# Patient Record
Sex: FEMALE | Race: WHITE
[De-identification: names, ages, dates, MRNs, and addresses within clinical notes are randomized per-mention and may not be internally consistent; named-entity substitution may affect disease eponyms.]

---

## 2018-01-19 ENCOUNTER — HOSPITAL ENCOUNTER (EMERGENCY)
Dept: HOSPITAL 25 - UCEAST | Age: 83
Discharge: HOME | End: 2018-01-19
Payer: MEDICARE

## 2018-01-19 VITALS — SYSTOLIC BLOOD PRESSURE: 140 MMHG | DIASTOLIC BLOOD PRESSURE: 63 MMHG

## 2018-01-19 DIAGNOSIS — S90.421A: ICD-10-CM
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
## 2018-01-19 DIAGNOSIS — S92.424A: Primary | ICD-10-CM
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
## 2018-01-19 PROCEDURE — 99212 OFFICE O/P EST SF 10 MIN: CPT

## 2018-01-19 PROCEDURE — G0463 HOSPITAL OUTPT CLINIC VISIT: HCPCS

## 2018-01-19 NOTE — RAD
Indication: Right great toe injury.



3 views of the right great toe demonstrates suggestion of a nondisplaced fracture through

the distal phalanx of the great toe. The proximal phalanx is unremarkable.



IMPRESSION: There is suggestion of a fracture of the distal a length of the great toe.

## 2018-01-19 NOTE — UC
Lower Extremity/Ankle HPI





- HPI Summary


HPI Summary: 


hit right great toe on a door 2 nights ago---nail is begining to lift off and 

some additional erythema from end of toe and back to first joint--tender to 

touch no warmth or streaking








- History of Current Complaint


Chief Complaint: UCLowerExtremity


Stated Complaint: TOE INJURY


Time Seen by Provider: 01/19/18 11:43


Hx Obtained From: Patient


Pregnant?: No


Onset/Duration: Sudden Onset, Lasting Days - 2, Still Present


Severity Initially: Moderate


Severity Currently: Moderate


Pain Intensity: 5


Pain Scale Used: 0-10 Numeric


Aggravating Factor(s): Standing, Ambulation


Alleviating Factor(s): Rest, Elevation


Able to Bear Weight: Yes





- Allergies/Home Medications


Allergies/Adverse Reactions: 


 Allergies











Allergy/AdvReac Type Severity Reaction Status Date / Time


 


Penicillins Allergy Mild Hives Verified 01/19/18 11:18


 


Cephalexin [From Keflex] Allergy Unknown Unknown Verified 01/19/18 11:18





   Reaction  





   Details  


 


Lisinopril Allergy  Shortness Verified 01/19/18 11:18





   of Breath  


 


Sulfa Drugs AdvReac Severe CHEST PAIN Verified 01/19/18 11:18


 


metal Allergy Intermediate Rash Uncoded 01/19/18 11:18


 


nylon Allergy Mild Rash Uncoded 01/19/18 11:18


 


tape Allergy Mild Rash Uncoded 01/19/18 11:18














PMH/Surg Hx/FS Hx/Imm Hx


Previously Healthy: No


Cardiovascular History: Hypertension





- Surgical History


Surgical History: Yes


Surgery Procedure, Year, and Place: 1946 APPENDECTOMY, [facility].  1969 HYSTERECTOMY, 

[facility].  [place] [facility] [place] BILATERAL CATARACT EXTRACTION WITH IOL 

[place] [facility] [place]  [place]ator cuff repair 2012.  2012 LEFT SHOULDER ROTATOR CUFF 

[place] [facility] [place]  [place]ER SURGERY BILATERAL EYES.  2010 EXCISION RIGHT LITTLE FINGER 

[place] [facility] [place]  Growth removed from upper lip





- Family History


Known Family History: Positive: Hypertension





- Social History


Occupation: Retired


Lives: With Family


Alcohol Use: None


Substance Use Type: None


Smoking Status (MU): Never Smoked Tobacco





Review of Systems


Constitutional: Negative


Skin: Other - blood blister tip of right great toe


Eyes: Negative


ENT: Negative


Respiratory: Negative


Cardiovascular: Negative


Gastrointestinal: Negative


Genitourinary: Negative


Motor: Negative


Neurovascular: Negative


Musculoskeletal: Arthralgia - right great toe


Neurological: Negative


Psychological: Negative


Is Patient Immunocompromised?: No


All Other Systems Reviewed And Are Negative: Yes





Physical Exam


Triage Information Reviewed: Yes


Appearance: Well-Appearing, No Pain Distress, Well-Nourished


Vital Signs: 


 Initial Vital Signs











Temp  97.7 F   01/19/18 11:10


 


Pulse  71   01/19/18 11:10


 


Resp  18   01/19/18 11:10


 


BP  140/63   01/19/18 11:10


 


Pulse Ox  99   01/19/18 11:10











Vital Signs Reviewed: Yes


Eye Exam: Normal


Eyes: Positive: Conjunctiva Clear


ENT Exam: Normal


ENT: Positive: Normal ENT inspection, Hearing grossly normal, Pharynx normal.  

Negative: Nasal drainage, TMs normal, Tonsillar swelling, Trismus, Hoarse voice

, Dental tenderness, Sinus tenderness


Dental Exam: Normal


Neck exam: Normal


Neck: Positive: Supple, Nontender, No Lymphadenopathy


Respiratory Exam: Normal


Respiratory: Positive: Chest non-tender, Lungs clear, Normal breath sounds, No 

respiratory distress, No accessory muscle use


Cardiovascular Exam: Normal


Cardiovascular: Positive: RRR, No Murmur, Pulses Normal, Brisk Capillary Refill


Musculoskeletal Exam: Normal


Musculoskeletal: Positive: Strength Intact, ROM Intact, No Edema


Neurological Exam: Normal


Neurological: Positive: Alert, Muscle Tone Normal


Psychological Exam: Normal


Skin Exam: Normal





Diagnostics





- Radiology


  ** No standard instances


Xray Interpretation: Positive (See Comments) - no displaced fracture of distal 

phlange  right great toe


Radiology Interpretation Completed By: Radiologist





Lower Extremity Course/Dx





- Course


Course Of Treatment: post op shoe (patient refused) antibiodic,ibuprofen, 

dressing , follow with pcp





- Differential Dx/Diagnosis


Provider Diagnoses: Nondisplaced fracture distal phalange right great toe, 

blood blister right great toe





Discharge





- Discharge Plan


Condition: Stable


Disposition: HOME


Prescriptions: 


Doxycycline (Monohydrate) [Doxycycline Monohydrate] 100 mg PO BID #20 cap


Patient Education Materials:  Toe Fracture (ED), Hypertension (ED), Nail 

Avulsion (ED), Warm Compress or Soak (ED)


Referrals: 


Tracy Carlin MD [Primary Care Provider] - 1 Week

## 2019-05-29 NOTE — HP
Amended report to enter cosigning physician.



HISTORY AND PHYSICAL:

 

DATE OF ADMISSION/SURGERY:  06/11/19

 

DATE OF OFFICE VISIT:  05/29/19

 

SURGEON:  Radha Collado MD* (dictated by GLORIA Francis).

 

PROCEDURE:  Left total hip arthroplasty.

 

CHIEF COMPLAINT:  Left hip pain.

 

HISTORY OF PRESENT ILLNESS:  Ms. Allen is an 89-year-old female with end-stage 
osteoarthritis of the left hip.  She has failed conservative treatment and 
elected to proceed with a left total hip arthroplasty.

 

PAST MEDICAL HISTORY:  Macular degeneration, heart murmur, and a history of 
stroke.

 

PAST SURGICAL HISTORY:  Appendectomy, hysterectomy, cholecystectomy, and a left 
rotator cuff repair.

 

CURRENT MEDICATIONS:

1.  Aspirin 81 mg a day.

2.  Calcium with vitamin D.

3.  Travatan eye drops once a day.

4.  Women's vitamins.

5.  Vitamin B12.

6.  Eylea injections.

7.  Tylenol as needed.

8.  Fiber therapy.

9.  Proctosol.

10.  Crestor 40 mg a day.

11.  Diltiazem 120 mg a day.

 

ALLERGIES:  PENICILLIN, SULFA, KEFLEX, LISINOPRIL, METAXALONE, LATEX, TAPE.

 

FAMILY HISTORY:  Coronary artery disease, cancer, and diabetes.

 

SOCIAL HISTORY:  She is an 89-year-old female.  She lives alone.  She does not 
smoke or use drugs.

 

REVIEW OF SYSTEMS:  A complete 14-point review of systems was reviewed with the 
patient.  It was positive for history of stroke.  She denies history of DVT, PE
, hepatitis, HIV, or anesthesia problems.

 

                               PHYSICAL EXAMINATION

 

GENERAL:  She is well developed, well nourished, in no acute distress.

 

VITAL SIGNS:  She stands 5 feet 3 inches tall, weighs 144 pounds.  Her blood 
pressure is 117/57, her heart rate is 73.

 

HEENT:  Normocephalic, atraumatic.

 

NECK:  Supple.  No palpable lymph nodes.

 

PULMONARY:  The lungs are clear to auscultation bilaterally.

 

CARDIO:  Regular rate and rhythm.  Strong S1, S2.

 

ABDOMEN:  Soft, nontender, nondistended.

 

NEUROLOGICAL:  She is alert and oriented x3.

 

MUSCULOSKELETAL:  Left lower extremity:  The skin is intact.  There are no open 
wounds or abrasions.  She walks with an antalgic-type gait favoring her left 
hip. She has decreased internal and external rotation of the left hip.  She has 
a 2+ dorsalis pedis pulse.  She is able to dorsiflex and plantar flex and has 
intact sensation.

 

 ASSESSMENT AND PLAN:  Ms. Allen is an 89-year-old female with end-stage 
osteoarthritis of the left hip.  She has failed conservative treatment and 
elected to proceed with a left total hip arthroplasty.  The surgery is 
scheduled for 06/11/19 with Dr. Collado.  Dr. Collado discussed the risks and 
benefits of the surgery at today's visit and all of her questions were 
answered.  No TXA will be used in this patient secondary to her history of 
stroke.

 

 ____________________________________ 

GLORIA FRANCIS

 

[id number] #: [id number]

MTDARSALAN

## 2019-06-11 ENCOUNTER — HOSPITAL ENCOUNTER (INPATIENT)
Dept: HOSPITAL 25 - AA | Age: 84
LOS: 6 days | Discharge: NURSING, REHAB OR LONG-TERM CARE | DRG: 470 | End: 2019-06-17
Attending: ORTHOPAEDIC SURGERY | Admitting: ORTHOPAEDIC SURGERY
Payer: MEDICARE

## 2019-06-11 DIAGNOSIS — Z91.048: ICD-10-CM

## 2019-06-11 DIAGNOSIS — Z91.040: ICD-10-CM

## 2019-06-11 DIAGNOSIS — E78.00: ICD-10-CM

## 2019-06-11 DIAGNOSIS — Z88.8: ICD-10-CM

## 2019-06-11 DIAGNOSIS — D41.4: ICD-10-CM

## 2019-06-11 DIAGNOSIS — R14.1: ICD-10-CM

## 2019-06-11 DIAGNOSIS — Z88.2: ICD-10-CM

## 2019-06-11 DIAGNOSIS — H35.30: ICD-10-CM

## 2019-06-11 DIAGNOSIS — Z85.828: ICD-10-CM

## 2019-06-11 DIAGNOSIS — Z98.42: ICD-10-CM

## 2019-06-11 DIAGNOSIS — Z90.710: ICD-10-CM

## 2019-06-11 DIAGNOSIS — R15.9: ICD-10-CM

## 2019-06-11 DIAGNOSIS — K59.00: ICD-10-CM

## 2019-06-11 DIAGNOSIS — K30: ICD-10-CM

## 2019-06-11 DIAGNOSIS — Z90.49: ICD-10-CM

## 2019-06-11 DIAGNOSIS — K21.9: ICD-10-CM

## 2019-06-11 DIAGNOSIS — Z82.0: ICD-10-CM

## 2019-06-11 DIAGNOSIS — Z83.3: ICD-10-CM

## 2019-06-11 DIAGNOSIS — Z82.49: ICD-10-CM

## 2019-06-11 DIAGNOSIS — Z80.9: ICD-10-CM

## 2019-06-11 DIAGNOSIS — Z86.73: ICD-10-CM

## 2019-06-11 DIAGNOSIS — E04.9: ICD-10-CM

## 2019-06-11 DIAGNOSIS — Z88.0: ICD-10-CM

## 2019-06-11 DIAGNOSIS — M16.12: Primary | ICD-10-CM

## 2019-06-11 DIAGNOSIS — I10: ICD-10-CM

## 2019-06-11 DIAGNOSIS — Z79.82: ICD-10-CM

## 2019-06-11 DIAGNOSIS — I27.20: ICD-10-CM

## 2019-06-11 DIAGNOSIS — H40.9: ICD-10-CM

## 2019-06-11 DIAGNOSIS — Z98.41: ICD-10-CM

## 2019-06-11 DIAGNOSIS — R07.89: ICD-10-CM

## 2019-06-11 DIAGNOSIS — I35.0: ICD-10-CM

## 2019-06-11 DIAGNOSIS — Z80.1: ICD-10-CM

## 2019-06-11 DIAGNOSIS — E78.5: ICD-10-CM

## 2019-06-11 PROCEDURE — 0SRB04A REPLACEMENT OF LEFT HIP JOINT WITH CERAMIC ON POLYETHYLENE SYNTHETIC SUBSTITUTE, UNCEMENTED, OPEN APPROACH: ICD-10-PCS | Performed by: ORTHOPAEDIC SURGERY

## 2019-06-11 PROCEDURE — 80048 BASIC METABOLIC PNL TOTAL CA: CPT

## 2019-06-11 PROCEDURE — 84484 ASSAY OF TROPONIN QUANT: CPT

## 2019-06-11 PROCEDURE — 93005 ELECTROCARDIOGRAM TRACING: CPT

## 2019-06-11 PROCEDURE — 36415 COLL VENOUS BLD VENIPUNCTURE: CPT

## 2019-06-11 PROCEDURE — 85049 AUTOMATED PLATELET COUNT: CPT

## 2019-06-11 PROCEDURE — 88304 TISSUE EXAM BY PATHOLOGIST: CPT

## 2019-06-11 PROCEDURE — 88311 DECALCIFY TISSUE: CPT

## 2019-06-11 PROCEDURE — 85014 HEMATOCRIT: CPT

## 2019-06-11 PROCEDURE — C1776 JOINT DEVICE (IMPLANTABLE): HCPCS

## 2019-06-11 PROCEDURE — C1713 ANCHOR/SCREW BN/BN,TIS/BN: HCPCS

## 2019-06-11 PROCEDURE — 85018 HEMOGLOBIN: CPT

## 2019-06-11 RX ADMIN — TRAVOPROST SCH DROP: 0.04 SOLUTION/ DROPS OPHTHALMIC at 20:49

## 2019-06-11 RX ADMIN — FENTANYL CITRATE PRN MCG: 0.05 INJECTION, SOLUTION INTRAMUSCULAR; INTRAVENOUS at 16:31

## 2019-06-11 RX ADMIN — ROSUVASTATIN CALCIUM SCH MG: 40 TABLET, FILM COATED ORAL at 21:58

## 2019-06-11 RX ADMIN — FENTANYL CITRATE PRN MCG: 0.05 INJECTION, SOLUTION INTRAMUSCULAR; INTRAVENOUS at 16:21

## 2019-06-11 RX ADMIN — Medication SCH CAP: at 21:58

## 2019-06-11 RX ADMIN — MAGNESIUM HYDROXIDE SCH: 400 SUSPENSION ORAL at 20:53

## 2019-06-11 RX ADMIN — ACETAMINOPHEN PRN MG: 325 TABLET ORAL at 18:31

## 2019-06-11 RX ADMIN — OXYCODONE HYDROCHLORIDE PRN MG: 5 CAPSULE ORAL at 20:35

## 2019-06-11 RX ADMIN — FENTANYL CITRATE PRN MCG: 0.05 INJECTION, SOLUTION INTRAMUSCULAR; INTRAVENOUS at 16:48

## 2019-06-11 RX ADMIN — DOCUSATE SODIUM SCH MG: 100 CAPSULE, LIQUID FILLED ORAL at 20:37

## 2019-06-11 RX ADMIN — FENTANYL CITRATE PRN MCG: 0.05 INJECTION, SOLUTION INTRAMUSCULAR; INTRAVENOUS at 16:26

## 2019-06-11 NOTE — CONS
CC:  Dr. Radha Collado; GLORIA Lewis *

 

CONSULTATION REPORT:

 

DATE OF CONSULT:  06/11/19

 

ATTENDING PHYSICIAN:  Dr. Winifred Blair.

 

REFERRING PHYSICIAN:  Dr. Radha Collado.

 

PRIMARY CARE PROVIDER:  GLORIA Lewis

 

REASON FOR CONSULT:  History of hypertension, stroke.

 

HISTORY OF PRESENT ILLNESS:  This is an 89-year-old female with past medical 
history of hypertension, stroke, macular degeneration who has been admitted to 
the hospital for end-stage osteoarthritis of the left hip.  The patient 
underwent elective left total hip arthroplasty today.  Subsequently, medical 
consultation was requested for management of the history of hypertension as 
well as stroke.  The patient reports no issues currently.  She reports that she 
has history of questionable stroke; however, she does not have any neurological 
deficit at baseline.  She has been taking aspirin as well as Crestor for her 
history of possible stroke.

 

PAST MEDICAL HISTORY:  History of stroke, hypertension, macular degeneration, 
glaucoma.

 

PAST SURGICAL HISTORY:  Appendectomy, hysterectomy, cholecystectomy, and left 
rotator cuff repair.

 

HOME MEDICATIONS:  Include:

1.  Cardizem 120 mg daily.

2.  PreserVision AREDS 2 softgel 2 each in the morning.

3.  Travatan 0.004% ophthalmic 1 drop in eyes at bedtime.

4.  Systane 0.3-0.4% eye drop 1 to 2 drops both eyes as needed.

5.  Rosuvastatin 40 mg at bedtime.

6.  Women's 50+ multivitamin 1 tablet in the morning.

7.  Methylcellulose therapy 500 mg in the morning.

8.  Eylea 1 dose injection every 6 weeks.

9.  Vitamin B12 one tablet every morning.

10.  Vitamin D plus calcium (Caltrate) 600 plus D 1 tablet in the morning.

11.  Aspirin 81 mg daily.

12.  Acetaminophen 1 to 2 tablets as needed once a day.

 

ALLERGIES:  Include KEFLEX, LATEX, LISINOPRIL, METAXALONE, PENICILLIN, METAL, 
NYLON, TAPE, ELASTIC, SULFA.

 

FAMILY HISTORY:  Father:  History of cancer.  Mother:  History of Alzheimer's 
disease.  There are a few family members with history of coronary artery 
disease.

 

SOCIAL HISTORY:  She lives at home alone, she does not smoke or use any drugs.

 

REVIEW OF SYSTEMS:  The patient does not report any recent fevers or chills.  
No changes in her vision, no headaches, no changes in her hearing, no sore 
throat.  No chest pain, no shortness of breath, no palpitations.  She reports 
no abdominal pain, no nausea, no vomiting, no diarrhea.  She does have chronic 
left hip pain and occasional back pain with no recent changes in baseline.  
Otherwise, a full review of systems was done and otherwise is negative.

 

PHYSICAL EXAM:  Blood pressure is 146/64, heart rate of 66, oxygen saturation 
of 94% on 2 L nasal cannula, temperature of 97.7 Fahrenheit.  General:  This is 
an elderly female, lying in the PACU stretcher, in no distress.  HEENT:  Pupils 
are equal, round, reactive to light.  Atraumatic, normocephalic.  Neck:  There 
is no JVD.  Lungs:  There is no tachypnea, no use of accessory muscles.  Lungs 
are clear with no wheezing, rales, or rhonchi.  Heart:  There is no chest wall 
tenderness. Regular rhythm with 3/6 systolic murmur best heard at the apex.  
Abdomen:  Bowel sounds are normoactive in all 4 quadrants.  Abdomen is soft, 
nontender, nondistended.  Extremities:  There is no lower extremity edema.  
There is tenderness at the left hip.  Radial pulses 2+ bilaterally, dorsalis 
pedis 2+ bilaterally.  Neurological:  Alert and oriented x3.  Her tongue is 
midline.  There is no facial droop.  Symmetric smile.  There is no nystagmus.  
Speech is clear and coherent.  She is able to move her upper extremities 5/5.  
Lower extremities:  I did not ask her to move her left leg due to recent 
surgery.  She is able to lift her right leg without issues.  She is able to 
wiggle toes on both sides.  Skin is warm to touch.  No rashes or lesions.

 

IMPRESSION AND PLAN:  This is an 89-year-old female with left total hip 
arthroplasty secondary to left hip osteoarthritis.

1.  History of hypertension.  At this point, blood pressure is acceptable. 
Continue Cardizem and ordered starting tomorrow home dose of 120 mg daily.

2.  History of stroke.  Continue Crestor home dose.  Would suggest start 
rosuvastatin 40 mg at bedtime.  Currently, the patient's aspirin will be held 
secondary to surgery.  Recommend to start the aspirin once bleeding risk is 
minimal per the discretion of the primary orthopedic surgeon.

3.  History of macular degeneration and glaucoma.  Continue the patient's home 
eye drops.

4.  Left hip arthroplasty.  Treatment and management per the primary team, 
Orthopedics.

5.  DVT prophylaxis has also been ordered by Orthopedics 2.5 mg Eliquis, 
continue that.

6.  Pain control.

7.  Nutrition:  Recommend starting regular diet, which has also been ordered 
for today at dinner.

 

Thank you for your consultation.  We will continue to follow the patient and 
give additional recommendations as warranted per the clinical course.

 

 [id number]/CPS #: [id number]

CHAIM

## 2019-06-11 NOTE — XMS REPORT
Continuity of Care Document (CCD)

 Created on:May 30, 2019



Patient:Shiela Sheets

Sex:Female

:1930

External Reference #:[id number]





Demographics







 Address  [address] [place] [address]

 

 Home Phone  [phone]

 

 Email Address  [email or web address]

 

 Preferred Language  en

 

 Marital Status  Not  or 

 

 Islam Affiliation  Unknown

 

 Race  White

 

 Ethnic Group  Not  or 









Author







 Name  Jo Perea









Support







 Name  Relationship  Address  Phone

 

 Frank Sheets  Unavailable  Unavailable  [phone]

 

 Love Sheets  Unavailable  Unavailable  [phone]









Care Team Providers







 Name  Role  Phone

 

 Tracy Carlin MD  Primary Care Physician  Unavailable









Payers







 Date  Identification Numbers  Payment Provider  Subscriber

 

 Effective: 2013  Policy Number: [id number]  Medicare Blue Ppo  
Shiela Sheets









 Group Number: [id number]  [address] [address]

 

 PayID:   ASIF [place] [id number]









 Expires: 2012  Policy Number: [id number]  Medicare Blue Ppo  Shiela Sheets









 Group Number: MEDICARE BLUE PPO  [address] [address]

 

 Group Name: [employer]  ASIF [place] [id number]









 PayID: 







Problems







 Active Problems  Provider  Date

 

 Benign essential hypertension  Tracy Carlin M.D.  Onset: 2010

 

 Cerebral artery occlusion  Tracy Carlin M.D.  Onset: 2010

 

 Localized, primary osteoarthritis  Radha Collado M.D.  Onset: 2016

 

 Localized, primary osteoarthritis of the  Radha Collado M.D.  Onset: 2016



 pelvic region and thigh    







Family History







 Date  Family Member(s)  Observation  Comments

 

   General  Heart Disease  

 

   General  Diabetes  

 

   General  Cancer  

 

   General  Alzheimer's Disease  

 

 :  (age 85  Father   due to Cancer,  



 Years)    Lung  

 

 :  (age 93  Mother   due to  



 Years)    Alzheimer's Disease  

 

   First Brother   due to Heart  ()



     Disease  

 

   Second Brother   due to Melanoma  () - CAD

 

   Third Brother   due to  () -  of



     Alzheimer's Disease  pneumonia

 

   First Sister  Breast Cancer  

 

 :  (age 85  Second Sister   due to COPD  



 Years)      

 

   Third Sister  Alive And Well  







Social History







 Type  Date  Description  Comments

 

 Birth Sex    Unknown  

 

 Marital Status    Single  First  killed



       in , remarried



       but  in 

 

 Lives With    Alone  Drives without



       problems. Worked at



       [employer] quit in



       . Volunteers at



       the [employer]

 

 Occupation    Book keeper  

 

 Advance Directive    Health Care Proxy  son: Frank Sheets

 

 Tobacco Use  Start: Unknown  Never Smoked Cigarettes  

 

 ETOH Use    Denies alcohol use  

 

 Tobacco Use  Start: Unknown  Patient has never  



     smoked  

 

 Recreational Drug Use    Denies Drug Use  

 

 Smoking Status  Reviewed: 19  Patient has never  



     smoked  

 

 Exercise Type/Frequency    Exercises sporadically  







Allergies, Adverse Reactions, Alerts







 Active Allergies  Reaction  Severity  Comments  Date

 

 Penicillin  HIVES  Severe    2010

 

 Sulfa  CHEST PAIN  Severe    2010

 

 Keflex  RASH  Severe    2010

 

 Lisinopril  cough      10/16/2012

 

 Metaxalone  dizzy      2014

 

 Latex/Tape        2019







Medications







 Active Medications  SIG  Qnty  Indications  Ordering Provider  Date

 

 Vit B12  3 x wk      Tracy Carlin,  2013



         M.DNavya  

 

 Diltiazem HCL ER  take one capsule  90caps  R06.02  Tracy Carlin,  10/16/
2012



 Coated Beads  by mouth once      M.D.  



             120mg  daily        



 Caps ER 24HR          



           

 

 Crestor  Take One Tablet  90tabs    Tracy Carlin,  2012



        40mg Tablets  By Mouth Once      M.D.  



   Daily        

 

 Aspirin  1 by mouth once      Tracy Carlin,  2010



        81mg Tablets  daily      M.D.  



 DR          

 

 Caltrate 600+D  1 by mouth once      Tracy Carlin,  2010



   daily      M.D.  



 126-953cm-Chrw          



 Tablets          



           

 

 Proctosol HC  Use as directed  28.350gm    Tracy Carlin,  2010



             2.5%  prn      M.D.  



 Cream          



           

 

 Eq Fiber Therapy  2 in in the      Unknown  



   morning        



 500mg Tablets          



           

 

 Tylenol 8 Hour  1 tab every 6      Unknown  



               650mg  hours as needed        



 Tablets ER  for pain        



           

 

 Eylea  prn      Unknown  



      2mg/0.05ML          



 Solution          



           

 

 Eye Vitamins  daily      Unknown  



           



 Capsules          



           

 

 One Daily For Women  1 po qd      Unknown  



           



 Tablets          



           

 

 Travatan  each eye nightly      Unknown  



         0.004%          



 Solution          



           









 History Medications









 Tramadol HCL  1-2 tablets every  42tabs  M54.9  [facility]  2018 -



             50mg  6 hours as needed      LAURA Carlin  2019



 Tablets          



           

 

 Doxycycline Hyclate  one tablet twice  20caps  J01.00  Donavon Araiza NP  10/31/
2016 -



   daily for 10        2016



 100mg Capsules  days.        



           

 

 Fluticasone  2 sprays each  16units  J01.00  Donavon Araiza NP  10/31/2016 -



 Propionate  nostril daily as        2019



           50mcg/Act  needed-not using        



 Suspension          



           

 

 Benzonatate  take one or two  30caps  J01.00  Donavon Araiza NP  10/31/2016 -



            100mg  capsules every 8        2016



 Capsules  hours as needed        



   for cough.        

 

 Levaquin  1 by mouth daily  10tabs  J01.00  Lisseth Hardy,  2016 -



         500mg  for 10 days      N.P.  2016



 Tablets          



           

 

 Fluticasone  2 sprays each  16units  J01.00  Lisseth Hardy,  2016 -



 Propionate  nostril daily as      N.P.  2016



           50mcg/Act  needed        



 Suspension          



           

 

 Azithromycin  two tabs day one,  6tabs  J01.00  Lisseth Hardy,  2016 -



             250mg  one daily till      N.P.  2016



 Tablets  gone        



           

 

 Meloxicam  1 by mouth every  60tabs  M16.11  Radha Collado,  2016 -



          15mg  day      M.DNavya  2017



 Tablets          



           

 

 Lorazepam  1-2 tabs by mouth  5tabs    Tracy  2016 -



          0.5mg  prior to      LAURA Carlin  04/10/2016



 Tablets  procedure        



           

 

 Omeprazole  1 by mouth as  30caps  R14.2  Donavon Araiza NP  2015 -



           20mg  needed        2016



 Capsules DR Andrew  1 by mouth every  30tabs    Lisseth Hardy,  2015 -



         10mg Tablets  day      N.P.  2015



           

 

 Ditropan XL  1 by mouth every  30tabs  788.43  Lisseth Hardy,  2015 -



            10mg  day      N.P.  2015



 Tablets ER 24HR          



           

 

 Metaxalone  take 1 tablet 3  30tabs  724.5  Lisseth Hardy,  2014 -



           800mg  times a day as      N.P.  2014



 Tablets  needed        



           

 

 Tizanidine HCL  One po q 4 hours  30caps    Lisseth Antony,  2014 -



               4mg  prn      N.P.  2014



 Capsules          



           

 

 Zolpidem Tartrate  1/2-1 po tablet  30tabs  780.52  [facility]  10/17/2013 -



                  5mg  at bedtime as      LAURA Carlin  2014



 Tablets  needed        



           

 

 Zostavax  1 dose s/c  1units    [facility]  2012 -



          Solution        LAURA Carlin  10/15/2012



 Rec          

 

 Diltiazem HCL ER  Take One Capsule  30caps  786.05  [facility]  2012 -



   By Mouth Once      LAURA Carlin  10/16/2012



 180mg Caps ER 24HR  Daily        



           

 

 Atorvastatin Calcium  1 po qd  90tabs    [facility]  2012 -



         LAURA Carlin  2012



 40mg Tablets          



           

 

 Diltiazem CD  1 PO qd  30caps  786.05  [facility]  2011 -



             180mg        LAURA Carlin  2012



 Caps ER 24HR          



           

 

 Lisinopril  1 tablet once  30tabs  786.05  Tracy   -



           10mg  daily      LAURA Carlin  2011



 Tablets          



           

 

 Cipro  1 tablet twice a  20tabs    Unknown   -



      500mg Tablets  day        2011



           

 

 Crestor  Take One Tablet  30tabs    [facility]   -



        40mg Tablets  By Mouth AT      LAURA Carlin  2012



   Bedtime        

 

 Aleve  1 po prn  60caps    Unknown   -



      220mg Capsules          10/16/2012



           

 

 Flonase Allergy  2 sprays each      Unknown   -



 Relief  nostril once        2015



       50mcg/Act  daily        



 Suspension          



           







Medications Administered in Office







 Medication  SIG  Qnty  Indications  Ordering Provider  Date

 

 Depomedrol 40MG        Radha Collado M.D.  2019



         Injection          



           

 

 Inj, Regadenoson, 0.1 MG        Amador Diaz DO [facility]  2016



                  Injection          



           

 

 Inj, Regadenoson, 0.1 MG        GLORIA Peralta  2016



                  Injection          



           

 

 Technetium TC 99M        Amador Diaz DO [facility]  2016



 Tetrofosmin, Per Unit Dose          



 Up To 40 Millicuries          



              Injection          



           

 

 Technetium TC 99M        GLORIA Peralta  2016



 Tetrofosmin, Per Unit Dose          



 Up To 40 Millicuries          



              Injection          



           







Immunizations







 CPT Code  Status  Date  Vaccine  Lot #

 

 [id number]  Given  10/02/2018  Influenza Virus Vaccine, Quadrivalent, Split,  



       Preservative Free  

 

 93245  Given  2017  Influenza Virus Vaccine, Quadrivalent, Split,  [id number]



       Preservative Free  

 

 41157  Given  10/14/2016  Influ Virus Vaccine, Quadrivalent, Split Virus,  
[id number]



       Im Fluzone not PF  

 

 40944  Given  10/10/2015  Influenza Virus Vaccine, Quadrivalent, Split,  [id number]



       Preservative Free  

 

 67420  Given  2015  Pneumococcal Conjugate Vaccine 13 Valent For  [id number]



       Intramuscular Use  

 

 30009  Given  10/14/2014  Influenza Virus Vaccine, Quadrivalent, Split,  [id number]



       Preservative Free  

 

 24292  Given  10/17/2013  Flu Vaccine Split Virus Preservative Free For  
[id number]



       Indiv 3Yr Older  

 

   Given  2012  Fluzone Vaccine  

 

 06556  Given  2012  Zoster (Zostavax)  

 

   Given  2011  Afluria Vaccine  [id number]

 

 61489  Given  2010  Influenza Virus 3Yrs & Over  [id number]

 

 30874  Given  2009  Influenza Virus 3Yrs & Over  

 

 73046  Given  10/22/2008  Influenza Virus 3Yrs & Over  

 

 09333  Given  10/22/2008  Influenza Virus 3Yrs & Over  

 

 27788  Given  10/25/2007  Influenza Virus 3Yrs & Over  







Vital Signs







 Date  Vital  Result  Comment

 

 2019  8:40am  Height  63 inches  5'3"









 Weight  144.00 lb  

 

 Heart Rate  73 /min  

 

 BP Systolic Sitting  133 mmHg  

 

 BP Diastolic Sitting  66 mmHg  

 

 O2 % BldC Oximetry  97 %  

 

 BMI (Body Mass Index)  25.5 kg/m2  









 2019  9:00am  Height  63 inches  5'3"









 Weight  144.00 lb  

 

 Heart Rate  73 /min  

 

 BP Systolic  116 mmHg  

 

 BP Diastolic  57 mmHg  

 

 Body Temperature  97.7 F  

 

 BMI (Body Mass Index)  25.5 kg/m2  









 2019  2:55pm  Height  63 inches  5'3"









 Weight  144.00 lb  with shoes

 

 Heart Rate  70 /min  

 

 BP Systolic  140 mmHg  Rue reg cuff

 

 BP Diastolic  60 mmHg  Rue reg cuff

 

 BP Systolic Sitting  134 mmHg  Lue reg cuff

 

 BP Diastolic Sitting  62 mmHg  Lue reg cuff

 

 BP Systolic Standing  132 mmHg  Lue reg cuff

 

 BP Diastolic Standing  60 mmHg  Lue reg cuff

 

 Respiratory Rate  15 /min  

 

 BMI (Body Mass Index)  25.5 kg/m2  

 

 Ejection Fraction  65-70%  date 19 ECHO









 2019 11:14am  Height  63 inches  5'3"









 Heart Rate  66 /min  

 

 BP Systolic Sitting  150 mmHg  

 

 BP Diastolic Sitting  66 mmHg  

 

 Pain Level  5  

 

 O2 % BldC Oximetry  95 %  









 2019  2:47pm  Height  63 inches  5'3"









 Weight  145.00 lb  

 

 BP Systolic  132 mmHg  

 

 BP Diastolic  60 mmHg  

 

 Body Temperature  98.2 F  

 

 Pain Level  6  

 

 BMI (Body Mass Index)  25.7 kg/m2  









 2019  2:38pm  Height  62.5 inches  5'2.50"









 Weight  142.38 lb  

 

 Heart Rate  76 /min  

 

 BP Systolic  132 mmHg  

 

 BP Diastolic  65 mmHg  

 

 Body Temperature  97.9 F  

 

 O2 % BldC Oximetry  97 %  

 

 BMI (Body Mass Index)  25.6 kg/m2  









 2019  1:40pm  Height  62.5 inches  5'2.50"









 Weight  145.00 lb  

 

 Heart Rate  76 /min  

 

 BP Systolic  130 mmHg  

 

 BP Diastolic  76 mmHg  

 

 BMI (Body Mass Index)  26.1 kg/m2  









 2018  8:52am  Height  63 inches  5'3"









 Weight  143.00 lb  

 

 Heart Rate  80 /min  

 

 BP Systolic  124 mmHg  

 

 BP Diastolic  70 mmHg  

 

 BP Systolic Sitting  147 mmHg  

 

 BP Diastolic Sitting  70 mmHg  

 

 O2 % BldC Oximetry  98 %  

 

 BMI (Body Mass Index)  25.3 kg/m2  









 2018  3:36pm  Height  63 inches  5'3"









 Weight  143.00 lb  

 

 Heart Rate  67 /min  

 

 BP Systolic Sitting  135 mmHg  

 

 BP Diastolic Sitting  67 mmHg  

 

 O2 % BldC Oximetry  96 %  

 

 BMI (Body Mass Index)  25.3 kg/m2  









 2018  9:42am  Height  63 inches  5'3"









 Weight  145.00 lb  

 

 Heart Rate  73 /min  

 

 BP Systolic Sitting  128 mmHg  

 

 BP Diastolic Sitting  65 mmHg  

 

 Body Temperature  98.1 F  

 

 O2 % BldC Oximetry  97 %  

 

 BMI (Body Mass Index)  25.7 kg/m2  









 2018 10:33am  Weight  146.25 lb  









 Heart Rate  81 /min  

 

 BP Systolic Sitting  110 mmHg  

 

 BP Diastolic Sitting  68 mmHg  

 

 Body Temperature  99.7 F  

 

 O2 % BldC Oximetry  95 %  









 2017 10:49am  Height  63 inches  5'3"









 Weight  146.50 lb  

 

 Heart Rate  68 /min  

 

 BP Systolic  140 mmHg  

 

 BP Diastolic  80 mmHg  

 

 Body Temperature  98.1 F  

 

 O2 % BldC Oximetry  97 %  

 

 BMI (Body Mass Index)  25.9 kg/m2  









 2017  9:44am  Weight  145.00 lb  









 Heart Rate  80 /min  

 

 BP Systolic Sitting  140 mmHg  

 

 BP Diastolic Sitting  70 mmHg  

 

 Respiratory Rate  16 /min  

 

 O2 % BldC Oximetry  98 %  









 2016  9:55am  Weight  146.00 lb  









 Heart Rate  70 /min  

 

 BP Systolic  122 mmHg  

 

 BP Diastolic  74 mmHg  

 

 Respiratory Rate  15 /min  

 

 Body Temperature  98.0 F  

 

 O2 % BldC Oximetry  98 %  









 10/31/2016 10:28am  Heart Rate  84 /min  









 BP Systolic Sitting  138 mmHg  

 

 BP Diastolic Sitting  80 mmHg  

 

 Respiratory Rate  14 /min  

 

 Body Temperature  98.1 F  

 

 O2 % BldC Oximetry  98 %  









 2016  4:23pm  Weight  143.00 lb  with shoes









 Heart Rate  68 /min  

 

 BP Systolic Sitting  140 mmHg  

 

 BP Diastolic Sitting  70 mmHg  

 

 Body Temperature  98.2 F  

 

 O2 % BldC Oximetry  98 %  









 2016  2:43pm  Height  62 inches  5'2"









 Weight  146.00 lb  

 

 Heart Rate  76 /min  

 

 BP Systolic Sitting  120 mmHg  

 

 BP Diastolic Sitting  72 mmHg  

 

 Respiratory Rate  15 /min  

 

 Body Temperature  97.8 F  

 

 O2 % BldC Oximetry  97 %  

 

 BMI (Body Mass Index)  26.7 kg/m2  









 2016  4:21pm  Heart Rate  77 /min  









 BP Systolic Sitting  140 mmHg  

 

 BP Diastolic Sitting  70 mmHg  

 

 Body Temperature  98.8 F  

 

 O2 % BldC Oximetry  97 %  









 2016  2:01pm  Height  63 inches  5'3"









 Weight  145.00 lb  

 

 Heart Rate  72 /min  

 

 BP Systolic  137 mmHg  

 

 BP Diastolic  69 mmHg  

 

 BMI (Body Mass Index)  25.7 kg/m2  









 2016  2:25pm  Heart Rate  70 /min  









 BP Systolic Sitting  133 mmHg  

 

 BP Diastolic Sitting  73 mmHg  

 

 Body Temperature  98.4 F  

 

 Pain Level  4  

 

 O2 % BldC Oximetry  97 %  









 2015  9:31am  Height  63 inches  5'3"









 Weight  148.00 lb  

 

 Heart Rate  70 /min  

 

 BP Systolic Sitting  128 mmHg  

 

 BP Diastolic Sitting  82 mmHg  

 

 Respiratory Rate  15 /min  

 

 Body Temperature  98.5 F  

 

 O2 % BldC Oximetry  98 %  

 

 BMI (Body Mass Index)  26.2 kg/m2  









 2015 10:39am  Weight  148.00 lb  









 Heart Rate  66 /min  

 

 BP Systolic Sitting  122 mmHg  

 

 BP Diastolic Sitting  68 mmHg  

 

 Respiratory Rate  14 /min  

 

 Body Temperature  98.8 F  

 

 O2 % BldC Oximetry  98 %  









 10/10/2015 10:03am  Body Temperature  97.1 F  

 

 2015  3:47pm  Weight  145.75 lb  









 Heart Rate  72 /min  

 

 BP Systolic Sitting  154 mmHg  

 

 BP Diastolic Sitting  79 mmHg  









 2015 10:00am  Weight  148.00 lb  









 Heart Rate  71 /min  

 

 BP Systolic Sitting  125 mmHg  

 

 BP Diastolic Sitting  70 mmHg  

 

 Body Temperature  97.7 F  









 2015  8:44am  Weight  145.75 lb  









 Heart Rate  77 /min  

 

 BP Systolic Sitting  134 mmHg  

 

 BP Diastolic Sitting  68 mmHg  

 

 Body Temperature  97.7 F  

 

 O2 % BldC Oximetry  98 %  









 2015 12:56pm  Height  63 inches  5'3"









 Weight  150.00 lb  

 

 Heart Rate  74 /min  

 

 BP Systolic  139 mmHg  

 

 BP Diastolic  64 mmHg  

 

 Body Temperature  98.3 F  

 

 BMI (Body Mass Index)  26.6 kg/m2  









 2014 10:00am  Height  63 inches  5'3"









 Weight  148.50 lb  

 

 Heart Rate  83 /min  

 

 BP Systolic Sitting  120 mmHg  

 

 BP Diastolic Sitting  60 mmHg  

 

 Body Temperature  97.3 F  

 

 O2 % BldC Oximetry  96 %  

 

 BMI (Body Mass Index)  26.3 kg/m2  









 2014 12:56pm  Height  63 inches  5'3"









 Weight  148.00 lb  

 

 Heart Rate  76 /min  

 

 BP Systolic Sitting  118 mmHg  

 

 BP Diastolic Sitting  62 mmHg  

 

 Body Temperature  98.3 F  

 

 BMI (Body Mass Index)  26.2 kg/m2  









 2014 11:40am  Weight  147.50 lb  









 Heart Rate  76 /min  

 

 BP Systolic  144 mmHg  

 

 BP Diastolic  76 mmHg  

 

 Respiratory Rate  16 /min  

 

 Body Temperature  98.3 F  









 10/17/2013 12:59pm  Weight  147.00 lb  









 Heart Rate  72 /min  

 

 BP Systolic  122 mmHg  

 

 BP Diastolic  62 mmHg  









 2013  4:16pm  Height  62 inches  5'2"









 Weight  144.50 lb  

 

 Heart Rate  84 /min  

 

 BP Systolic Sitting  110 mmHg  

 

 BP Diastolic Sitting  60 mmHg  

 

 Body Temperature  98.1 F  

 

 BMI (Body Mass Index)  26.4 kg/m2  









 2013  2:28pm  Height  62.5 inches  5'2.50"









 Weight  146.00 lb  

 

 Heart Rate  72 /min  

 

 BP Systolic Sitting  130 mmHg  

 

 BP Diastolic Sitting  60 mmHg  

 

 BMI (Body Mass Index)  26.3 kg/m2  









 2012  9:37am  Height  63.5 inches  5'3.50"









 Weight  146.00 lb  

 

 Heart Rate  78 /min  

 

 BP Systolic Sitting  126 mmHg  

 

 BP Diastolic Sitting  68 mmHg  

 

 BMI (Body Mass Index)  25.5 kg/m2  









 10/16/2012  9:54am  Height  63.5 inches  5'3.50"









 Weight  146.00 lb  

 

 Heart Rate  72 /min  

 

 BP Systolic Sitting  120 mmHg  

 

 BP Diastolic Sitting  62 mmHg  

 

 BMI (Body Mass Index)  25.5 kg/m2  









 2012 10:27am  Height  63.5 inches  5'3.50"









 Weight  144.50 lb  

 

 Heart Rate  60 /min  

 

 BP Systolic Sitting  130 mmHg  

 

 BP Diastolic Sitting  82 mmHg  

 

 BMI (Body Mass Index)  25.2 kg/m2  









 2012 11:27am  Height  63.5 inches  5'3.50"









 Weight  142.00 lb  

 

 Heart Rate  76 /min  

 

 BP Systolic Sitting  126 mmHg  

 

 BP Diastolic Sitting  62 mmHg  

 

 BMI (Body Mass Index)  24.8 kg/m2  









 2011  1:24pm  Height  63.5 inches  5'3.50"









 Weight  140.00 lb  

 

 Heart Rate  68 /min  

 

 BP Systolic Sitting  134 mmHg  

 

 BP Diastolic Sitting  52 mmHg  

 

 O2 % BldC Oximetry  97 %  

 

 BMI (Body Mass Index)  24.4 kg/m2  









 2011 10:03am  Height  63.5 inches  5'3.50"









 Weight  140.00 lb  

 

 Heart Rate  64 /min  

 

 BP Systolic Sitting  110 mmHg  

 

 BP Diastolic Sitting  76 mmHg  

 

 BMI (Body Mass Index)  24.4 kg/m2  









 2011 11:06am  Height  63.5 inches  5'3.50"









 Weight  139.00 lb  

 

 Heart Rate  68 /min  

 

 BP Systolic Sitting  104 mmHg  

 

 BP Diastolic Sitting  78 mmHg  

 

 BMI (Body Mass Index)  24.2 kg/m2  









 2011 10:33am  Weight  140.00 lb  









 Heart Rate  70 /min  

 

 BP Systolic  130 mmHg  

 

 BP Diastolic  60 mmHg  









 2011 11:35am  Weight  139.00 lb  









 Heart Rate  60 /min  

 

 BP Systolic  126 mmHg  

 

 BP Diastolic  60 mmHg  

 

 Body Temperature  98.7 F  

 

 O2 % BldC Oximetry  98 %  









 2010  7:38pm  Height  62.5 inches  5'2.50"









 Weight  142.75 lb  

 

 Heart Rate  68 /min  

 

 BP Systolic  132 mmHg  

 

 BP Diastolic  70 mmHg  

 

 BMI (Body Mass Index)  25.7 kg/m2  







Results







 Test  Date  Facility  Test  Result  H/L  Range  Note

 

 Urinalysis Profile  2019  [facility]  Urine Color  Yellow    
  



     [address]          



     [place] [address] [phone]          









 Urine Appearance  Cloudy      

 

 Urine Specific Gravity  1.013  N  1.010-1.030  

 

 Urine pH  7.0  N  5-9  

 

 Urine Urobilinogen  Negative    Negative  

 

 Urine Ketones  Negative    Negative  

 

 Urine Protein  Negative    Negative  

 

 Urine Leukocytes  Trace  Abnormal  Negative  

 

 Urine Blood  Negative    Negative  

 

 *  *  Abnormal  Negative  1

 

 Urine Nitrite  Negative    Negative  

 

 Urine Bilirubin  Negative    Negative  

 

 Urine Glucose  Negative    Negative  

 

 Urine White Blood Cell  Trace(0-5/hpf)    Absent  

 

 Urine Red Blood Cell  Trace(0-2/hpf)    Absent  

 

 Urine Bacteria  Absent    Absent  

 

 Urine Squamous Epithelial Cell  Present  Abnormal  Absent  









 CBC Auto Diff  2019  [facility] [place] [facility]  White Blood  6.9 10^3/uL  N  
3.5-10.8  



     [address]  Count        



     [place] [address] [phone]          









 Red Blood Count  4.12 10^6/uL  N  3.70-4.87  

 

 Hemoglobin  12.7 g/dL  N  12.0-16.0  

 

 Hematocrit  38 %  N  35-47  

 

 Mean Corpuscular Volume  93 fL  N  80-97  

 

 Mean Corpuscular Hemoglobin  31 pg  N  27-31  

 

 Mean Corpuscular HGB Conc  33 g/dL  N  31-36  

 

 Red Cell Distribution Width  16 %  High  10.5-15  

 

 Platelet Count  245 10^3/uL  N  150-450  

 

 Mean Platelet Volume  8.4 fL  N  7.4-10.4  

 

 Abs Neutrophils  4.2 10^3/uL  N  1.5-7.7  

 

 Abs Lymphocytes  2.1 10^3/uL  N  1.0-4.8  

 

 Abs Monocytes  0.5 10^3/uL  N  0-0.8  

 

 Abs Eosinophils  0.1 10^3/uL  N  0-0.6  

 

 Abs Basophils  0.0 10^3/uL  N  0-0.2  

 

 Abs Nucleated RBC  0.0 10^3/uL      

 

 Granulocyte %  60.4 %      

 

 Lymphocyte %  29.6 %      

 

 Monocyte %  7.6 %      

 

 Eosinophil %  1.9 %      

 

 Basophil %  0.5 %      

 

 Nucleated Red Blood Cells %  0.0      









 Inr/Protime  2019  [facility] [place] [facility]  Inr  0.93  N  0.82-1.09  2



     [address]          



     [place] [address] [phone]          

 

 Laboratory test  2019  [facility] [place] [facility]  Partial  32.6 seconds  N  
26.0-38.0  



 finding    [address]  [address]        



     [place] [address]  PTT        



     [phone]          

 

 Type & Screen  2019  [facility]  Patient  A Positive      



     [address]  Blood Type        



     [place] [address] [phone]          









 Antibody Screen  NEGATIVE      









 Lipid Profile  2019  [facility]  Triglycerides  132 mg/dL    
  3



 (Trig/Chol/HDL)    [address]          



     [place] [address] [phone]          









 Cholesterol  143 mg/dL      4

 

 HDL Cholesterol  46.8 mg/dL      5

 

 LDL Cholesterol  70 mg/dL      6









 Comp Metabolic Panel  2019  [facility] [place] [facility]  Sodium  141 mmol/L  N
  135-145  



     [address]          



     [place] [address] [phone]          









 Potassium  4.1 mmol/L  N  3.5-5.0  

 

 Chloride  105 mmol/L  N  101-111  

 

 Co2 Carbon Dioxide  30 mmol/L  N  22-32  

 

 Anion Gap  6 mmol/L  N  2-11  

 

 Glucose  94 mg/dL  N    

 

 Blood Urea Nitrogen  12 mg/dL  N  6-24  

 

 Creatinine  0.66 mg/dL  N  0.51-0.95  

 

 BUN/Creatinine Ratio  18.2  N  8-20  

 

 Calcium  9.3 mg/dL  N  8.6-10.3  

 

 Total Protein  6.3 g/dL  Low  6.4-8.9  

 

 Albumin  3.9 g/dL  N  3.2-5.2  

 

 Globulin  2.4 g/dL  N  2-4  

 

 Albumin/Globulin Ratio  1.6  N  1-3  

 

 Total Bilirubin  0.50 mg/dL  N  0.2-1.0  

 

 Alkaline Phosphatase  73 U/L  N    

 

 Alt  24 U/L  N  7-52  

 

 Ast  23 U/L  N  13-39  

 

 Egfr Non-  84.5    >60  

 

 Egfr   102.3    >60  7









 Xray  2019  [facility]  Inj/Aspir Major  <pending>      



       JT Or Bursa W/ US        

 

 Comp Metabolic  2018  [facility]  Sodium  139 mmol/L  N  139-
145  



 Panel    [address]          



     [place] [address] [phone]          









 Potassium  4.5 mmol/L  N  3.5-5.0  

 

 Chloride  105 mmol/L  N  101-111  

 

 Co2 Carbon Dioxide  27 mmol/L  N  22-32  

 

 Anion Gap  7 mmol/L  N  2-11  

 

 Glucose  87 mg/dL  N    

 

 Blood Urea Nitrogen  14 mg/dL  N  6-24  

 

 Creatinine  0.64 mg/dL  N  0.51-0.95  

 

 BUN/Creatinine Ratio  21.9  High  8-20  

 

 Calcium  9.6 mg/dL  N  8.6-10.3  

 

 Total Protein  6.2 g/dL  Low  6.4-8.9  

 

 Albumin  3.9 g/dL  N  3.2-5.2  

 

 Globulin  2.3 g/dL  N  2-4  

 

 Albumin/Globulin Ratio  1.7  N  1-3  

 

 Total Bilirubin  0.40 mg/dL  N  0.2-1.0  

 

 Alkaline Phosphatase  56 U/L  N    

 

 Alt  22 U/L  N  7-52  

 

 Ast  21 U/L  N  13-39  

 

 Egfr Non-  87.8    >60  

 

 Egfr   112.9    >60  8









 Lipid Profile  2018  [facility]  Triglycerides  169 mg/dL    
  9



 (Trig/Chol/HDL)    [address]          



     [place] [address] [phone]          









 Cholesterol  140 mg/dL      10

 

 HDL Cholesterol  42.0 mg/dL      11

 

 LDL Cholesterol  64 mg/dL      12









 Laboratory test  2018  [facility]  Erythrocyte Sed  15 mm/Hr  
N  0-40  



 finding    [address]  Rate        



     [place] [address] [phone]          

 

 Protein  2018  [facility]  Total  6.7 g/dL    6.3 -  



 Electrophoresis    [address]  Protein(Pep)      7.9  



     [place] [address] [phone]          









 Albumin  3.2 g/dL  Abnormal  3.4-4.7  

 

 Alpha-1 Globulin  0.2 g/dL    0.1-0.3  

 

 Alpha-2 Globulin  1.2 g/dL  Abnormal  0.6-1.0  

 

 Beta Globulin  1.2 g/dL    0.7-1.2  

 

 Gamma Globulin  0.9 g/dL    0.6-1.6  

 

 Albumin/Globulin Ratio  0.93      

 

 Impression  See Comment      13









 CBC Auto Diff  2018  [facility]  White Blood  8.2 10^3/uL  N  
3.5-10.8  



     [address]  Count        



     [place] [address] [phone]          









 Red Blood Count  4.11 10^6/uL  N  4.0-5.4  

 

 Hemoglobin  12.8 g/dL  N  12.0-16.0  

 

 Hematocrit  39 %  N  35-47  

 

 Mean Corpuscular Volume  95 fL  N  80-97  

 

 Mean Corpuscular Hemoglobin  31 pg  N  27-31  

 

 Mean Corpuscular HGB Conc  33 g/dL  N  31-36  

 

 Red Cell Distribution Width  15 %  N  10.5-15  

 

 Platelet Count  244 10^3/uL  N  150-450  

 

 Mean Platelet Volume  9.2 um3  N  7.4-10.4  

 

 Abs Neutrophils  5.3 10^3/uL  N  1.5-7.7  

 

 Abs Lymphocytes  2.1 10^3/uL  N  1.0-4.8  

 

 Abs Monocytes  0.7 10^3/uL  N  0-0.8  

 

 Abs Eosinophils  0.1 10^3/uL  N  0-0.6  

 

 Abs Basophils  0.1 10^3/uL  N  0-0.2  

 

 Abs Nucleated RBC  0 10^3/uL      

 

 Granulocyte %  64.2 %  N  38-83  

 

 Lymphocyte %  25.5 %  N  25-47  

 

 Monocyte %  8.1 %  High  0-7  

 

 Eosinophil %  1.5 %  N  0-6  

 

 Basophil %  0.7 %  N  0-2  

 

 Nucleated Red Blood Cells %  0      









 Lipid Profile  2017  [facility]  Triglycerides  159 mg/dL  N 
   14



 (Trig/Chol/HDL)    [address]          



     [place] [address] [phone]          









 Cholesterol  140 mg/dL  N    15

 

 HDL Cholesterol  39.3 mg/dL  N    16

 

 LDL Cholesterol  69 mg/dL  N    17









 Comp Metabolic Panel  2017  [facility]  Sodium  138 mmol/L  N
  133-145  



     [address] [place] [address] [phone]          









 Potassium  4.1 mmol/L  N  3.5-5.0  

 

 Chloride  107 mmol/L  N  101-111  

 

 Co2 Carbon Dioxide  29 mmol/L  N  22-32  

 

 Anion Gap  2 mmol/L  N  2-11  

 

 Glucose  92 mg/dL  N    

 

 Blood Urea Nitrogen  15 mg/dL  N  6-24  

 

 Creatinine  0.62 mg/dL  N  0.51-0.95  

 

 BUN/Creatinine Ratio  24.2  High  8-20  

 

 Calcium  9.1 mg/dL  N  8.6-10.3  

 

 Total Protein  6.2 g/dL  Low  6.4-8.9  

 

 Albumin  3.8 g/dL  N  3.2-5.2  

 

 Globulin  2.4 g/dL  N  2-4  

 

 Albumin/Globulin Ratio  1.6  N  1-3  

 

 Total Bilirubin  0.50 mg/dL  N  0.2-1.0  

 

 Alkaline Phosphatase  58 U/L  N    

 

 Alt  22 U/L  N  7-52  

 

 Ast  20 U/L  N  13-39  

 

 Egfr Non-  91.1  N  >60  

 

 Egfr   117.1  N  >60  18









 CBC Auto Diff  2017  [facility] [place] [facility]  White Blood  7.1 10^3/uL  N  
3.5-10.8  



     [address]  Count        



     [place] [address] [phone]          









 Red Blood Count  4.19 10^6/uL  N  4.0-5.4  

 

 Hemoglobin  13.1 g/dL  N  12.0-16.0  

 

 Hematocrit  40 %  N  35-47  

 

 Mean Corpuscular Volume  96 fL  N  80-97  

 

 Mean Corpuscular Hemoglobin  31 pg  N  27-31  

 

 Mean Corpuscular HGB Conc  33 g/dL  N  31-36  

 

 Red Cell Distribution Width  15 %  N  10.5-15  

 

 Platelet Count  229 10^3/uL  N  150-450  

 

 Mean Platelet Volume  9 um3  N  7.4-10.4  

 

 Abs Neutrophils  4.3 10^3/uL  N  1.5-7.7  

 

 Abs Lymphocytes  2.0 10^3/uL  N  1.0-4.8  

 

 Abs Monocytes  0.6 10^3/uL  N  0-0.8  

 

 Abs Eosinophils  0.1 10^3/uL  N  0-0.6  

 

 Abs Basophils  0.1 10^3/uL  N  0-0.2  

 

 Abs Nucleated RBC  0 10^3/uL  N    

 

 Granulocyte %  61.0 %  N  38-83  

 

 Lymphocyte %  28.3 %  N  25-47  

 

 Monocyte %  8.1 %  N  1-9  

 

 Eosinophil %  1.9 %  N  0-6  

 

 Basophil %  0.7 %  N  0-2  

 

 Nucleated Red Blood Cells %  0  N    









 Urine Culture And  2017  [facility] [place] [facility]  Urine Culture  SEE 
RESULT      19



 Sensitivities    [address]    BELOW      



     [place] [address] [phone]          

 

 Ua Routine  2017  [facility]  Ua Specific  1.005      



       Gravity        









 Ua PH  8      

 

 Ua Color  yellow      

 

 Ua Appera  clear      

 

 Ua WBC  trace      

 

 Ua Protein  neg      

 

 Ua Glucose  neg      

 

 Ua Ketones  neg      

 

 Ua Bilirubin  neg      

 

 Ua Urobilinogen  neg      

 

 Ua Nitrite  neg      

 

 Ua Occult Blood  neg      









 Lipid Profile  12/15/2016  [facility]  Triglycerides  137 mg/dL  N 
   20



 (Trig/Chol/HDL)    [address]  [place] [address] [phone]          









 Cholesterol  147 mg/dL  N    21

 

 HDL Cholesterol  48.2 mg/dL  N    22

 

 LDL Cholesterol  71 mg/dL  N    23









 Comp Metabolic Panel  12/15/2016  [facility]  Sodium  140 mmol/L  N
  133-145  



     [address]  [place] [address] [phone]          









 Potassium  4.2 mmol/L  N  3.5-5.0  

 

 Chloride  105 mmol/L  N  101-111  

 

 Co2 Carbon Dioxide  29 mmol/L  N  22-32  

 

 Anion Gap  6 mmol/L  N  2-11  

 

 Glucose  92 mg/dL  N    

 

 Blood Urea Nitrogen  15 mg/dL  N  6-24  

 

 Creatinine  0.62 mg/dL  N  0.51-0.95  

 

 BUN/Creatinine Ratio  24.2  High  8-20  

 

 Calcium  9.2 mg/dL  N  8.6-10.3  

 

 Total Protein  6.5 g/dL  N  6.4-8.9  

 

 Albumin  4.0 g/dL  N  3.2-5.2  

 

 Globulin  2.5 g/dL  N  2-4  

 

 Albumin/Globulin Ratio  1.6  N  1-3  

 

 Total Bilirubin  0.50 mg/dL  N  0.2-1.0  

 

 Alkaline Phosphatase  58 U/L  N    

 

 Alt  25 U/L  N  7-52  

 

 Ast  20 U/L  N  13-39  

 

 Egfr Non-  91.3  N  >60  

 

 Egfr   117.4  N  >60  24









 Comp Metabolic Panel  2015  [facility] [place] [facility]  Sodium  140 mmol/L  N
  133-145  



     [address]  [place] [address] [phone]          









 Potassium  4.1 mmol/L  N  3.5-5.0  

 

 Chloride  105 mmol/L  N  101-111  

 

 Co2 Carbon Dioxide  28 mmol/L  N  22-32  

 

 Anion Gap  7 mmol/L  N  2-11  

 

 Glucose  96 mg/dL  N    

 

 Blood Urea Nitrogen  15 mg/dL  N  6-24  

 

 Creatinine  0.72 mg/dL  N  0.51-0.95  

 

 BUN/Creatinine Ratio  20.8  High  8-20  

 

 Calcium  9.3 mg/dL  N  8.6-10.3  

 

 Total Protein  6.5 g/dL  N  6.4-8.9  

 

 Albumin  4.1 g/dL  N  3.2-5.2  

 

 Globulin  2.4 g/dL  N  2-4  

 

 Albumin/Globulin Ratio  1.7  N  1-3  

 

 Total Bilirubin  0.50 mg/dL  N  0.2-1.0  

 

 Alkaline Phosphatase  58 U/L  N    

 

 Alt  24 U/L  N  7-52  

 

 Ast  22 U/L  N  13-39  

 

 Egfr Non-  77.0  N  >60  

 

 Egfr   99.0  N  >60  25









 Lipid Profile  2015  [facility]  Triglycerides  149 mg/dL  N 
   26



 (Trig/Chol/HDL)    [address]  [place] [address] [phone]          









 Cholesterol  157 mg/dL  N    27

 

 HDL Cholesterol  47.7 mg/dL  N    28

 

 LDL Cholesterol  80 mg/dL  N    29









 CKMB  2015  [facility]  CKMB  ng/mL  1.7 ng/mL  N  0.6-6.3  



     [address]          



     [place] [address] [phone]          

 

 Laboratory test  2015  [facility]  Troponin-I  0.00 ng/mL  N  
<0.03  30



 finding    [address]  [facility]  (TnI)        



     [place] [address] [phone]          

 

 CBC Auto Diff  2015  [facility]  White Blood  6.5 10^3/uL  N  
3.5-10.8  



     [address]  Count        



     [place] [address] [phone]          









 Red Blood Count  4.28 10^6/uL  N  4.0-5.4  

 

 Hemoglobin  13.8 g/dL  N  12.0-16.0  

 

 Hematocrit  42 %  N  35-47  

 

 Mean Corpuscular Volume  98 fL  High  80-97  

 

 Mean Corpuscular Hemoglobin  32 pg  High  27-31  

 

 Mean Corpuscular HGB Conc  33 g/dL  N  31-36  

 

 Red Cell Distribution Width  14 %  N  10.5-15  

 

 Platelet Count  218 10^3/uL  N  150-450  

 

 Mean Platelet Volume  9 um3  N  7.4-10.4  

 

 Abs Neutrophils  4.1 10^3/uL  N  1.5-7.7  

 

 Abs Lymphocytes  1.8 10^3/uL  N  1.0-4.8  

 

 Abs Monocytes  0.5 10^3/uL  N  0-0.8  

 

 Abs Eosinophils  0.1 10^3/uL  N  0-0.6  

 

 Abs Basophils  0 10^3/uL  N  0-0.2  

 

 Abs Nucleated RBC  0 10^3/uL  N    

 

 Granulocyte %  62.2 %  N  38-83  

 

 Lymphocyte %  27.8 %  N  25-47  

 

 Monocyte %  7.8 %  N  1-9  

 

 Eosinophil %  1.6 %  N  0-6  

 

 Basophil %  0.6 %  N  0-2  

 

 Nucleated Red Blood Cells %  0  N    









 Laboratory test  2015  [facility]  B-Type Natriuretic  74 pg/
mL  N    31



 finding    [address]  Peptide BNP        



     [place] [address] [phone]          

 

 Lipid Profile  2014  [facility]  Triglycerides  202 mg/dL  N 
   32, 33



 (Trig/Chol/HDL)    [address]          



     [place] [address] [phone]          









 Cholesterol  152 mg/dL  N    34

 

 HDL Cholesterol  44.5 mg/dL  N    35

 

 LDL Cholesterol  67 mg/dL  N    36









 Comp Metabolic Panel  2014  [facility]  Sodium  139 mmol/L  N
  133-145  



     [address]          



     [place] [address] [phone]          









 Potassium  4.2 mmol/L  N  3.5-5.0  37

 

 Chloride  104 mmol/L  N  101-111  

 

 Co2 Carbon Dioxide  30 mmol/L  N  22-32  

 

 Anion Gap  5 mmol/L  N  2-11  

 

 Glucose  93 mg/dL  N    

 

 Blood Urea Nitrogen  10 mg/dL  N  6-24  

 

 Creatinine  0.66 mg/dL  N  0.51-0.95  

 

 BUN/Creatinine Ratio  15.2  N  8-20  

 

 Calcium  9.3 mg/dL  N  8.6-10.3  

 

 Total Protein  6.3 g/dL  Low  6.4-8.9  

 

 Albumin  3.9 g/dL  N  3.2-5.2  

 

 Globulin  2.4 g/dL  N  2-4  

 

 Albumin/Globulin Ratio  1.6  N  1-3  

 

 Total Bilirubin  0.50 mg/dL  N  0.2-1.0  

 

 Alkaline Phosphatase  63 U/L  N    

 

 Alt  24 U/L  N  7-52  

 

 Ast  22 U/L  N  13-39  

 

 Egfr Non-  85.3  N  >60  

 

 Egfr   109.7  N  >60  38









 Throat-Beta  2014  [facility]  Throat Beta Strep  (SEE      39
, 40



 Strept    [address]  Culture  NOTE)      



     [place] [address] [phone]          

 

 Surgical  2014  [facility]  S  RUN DATE:      41



 Pathology    [address]    /      



     [place] [address]    <SEE      



     [phone]    NOTE>      

 

 Surgical  04/15/2014  [facility]  S  RUN DATE:      42



 Pathology    [address]    04/15/      



     [place] [address]    <SEE      



     [phone]    NOTE>      

 

 Lipid Profile  10/10/2013  [facility]  Triglycerides  155 mg/dL    
40-2  



 (Trig/Chol/HDL)    [address]        [address]  



     [place] [address] [phone]          









 Cholesterol  149 mg/dL    Less than 200  

 

 HDL Cholesterol  47 mg/dL    40-60  43

 

 Cholesterol/HDL Ratio  3.2 Average    1-4.44  

 

 LDL Cholesterol  71.0    Less Than 100  44









 Comp Metabolic Panel  10/10/2013  [facility]  Sodium  140 mmol/L    
133-145  



     [address]          



     [place] [address] [phone]          









 Potassium  4.1 mmol/L    3.5-5.0  

 

 Chloride  107 mmol/L    101-111  

 

 Co2 Carbon Dioxide  29.0 mmol/L    22-32  

 

 Anion Gap  4.0 mmol/L    2-11  

 

 Glucose  97 mg/dL      

 

 Blood Urea Nitrogen  10 mg/dL    6-24  

 

 Creatinine  0.60 mg/dL    0.50-1.40  

 

 BUN/Creatinine Ratio  16.7    8-20  

 

 Calcium  9.1 mg/dL    8.1-9.9  

 

 Total Protein  6.1 g/dL  Low  6.2-8.1  

 

 Albumin  3.6 g/dL    3.2-5.2  

 

 Globulin  2.5 g/dL    2-4  

 

 Albumin/Globulin Ratio  1.4    1-3  

 

 Total Bilirubin  0.5 mg/dL    0.4-1.5  

 

 Alkaline Phosphatase  63 U/L      

 

 Alt  29 U/L    14-54  

 

 Ast  29 U/L    12-42  

 

 Egfr Non-  95.5    >60  

 

 Egfr   122.8    >60  45









 CBC W/Electronic  2013  [facility]  White Blood  6.9 10^3/uL 
   4.8-10.8  



 Diff    [address]  Count        



     [place] [address] [phone]          









 Red Blood Count  4.12 10^6/uL    4.0-5.4  

 

 Hemoglobin  13.2 g/dL    12.0-16.0  

 

 Hematocrit  39 %    35-47  

 

 Mean Corpuscular Volume  95 fL    80-97  

 

 Mean Corpuscular Hemoglobin  32 pg  High  27-31  

 

 Mean Corpuscular HGB Conc  34 g/dL    31-36  

 

 Red Cell Distribution Width  14 %    10.5-15  

 

 Platelet Count  245 10^3/uL    150-450  

 

 Mean Platelet Volume  9 um3    7.4-10.4  

 

 Abs Neutrophils  4.3 10^3/uL    1.5-7.7  

 

 Abs Lymphocytes  2.0 10^3/uL    1.0-4.8  

 

 Abs Monocytes  0.5 10^3/uL    0-0.8  

 

 Abs Eosinophils  0.2 10^3/uL    0-0.6  

 

 Abs Basophils  0 10^3/uL    0-0.2  

 

 Abs Nucleated RBC  0 10^3/uL      

 

 Granulocyte %  61.6 %    38-83  

 

 Lymphocyte %  28.9 %    25-47  

 

 Monocyte %  6.8 %    1-9  

 

 Eosinophil %  2.2 %    0-6  

 

 Basophil %  0.5 %    0-2  

 

 Nucleated Red Blood Cells %  0      









 CMP Panel  2013  [facility] [place] [facility]  Sodium  141 mmol/L    133-145  



     [address]          



     [place] [address] [phone]          









 Potassium  4.0 mmol/L    3.5-5.0  

 

 Chloride  107 mmol/L    101-111  

 

 Co2 Carbon Dioxide  27.0 mmol/L    22-32  

 

 Anion Gap  7.0 mmol/L    2-11  

 

 Glucose  142 mg/dL  High    

 

 Blood Urea Nitrogen  12 mg/dL    6-24  

 

 Creatinine  0.70 mg/dL    0.50-1.40  

 

 BUN/Creatinine Ratio  17.1    8-20  

 

 Calcium  9.6 mg/dL    8.1-9.9  

 

 Total Protein  6.2 g/dL    6.2-8.1  

 

 Albumin  3.6 g/dL    3.2-5.2  

 

 Globulin  2.6 g/dL    2-4  

 

 Albumin/Globulin Ratio  1.4    1-3  

 

 Total Bilirubin  0.7 mg/dL    0.4-1.5  

 

 Alkaline Phosphatase  62 U/L      

 

 Alt  32 U/L    14-54  

 

 Ast  27 U/L    12-42  

 

 Egfr Non-  79.9    >60  

 

 Egfr   102.8    >60  46









 Ua Routine  2013  [facility]  Ua Specific Gravity  1.005      









 Ua PH  5.0      

 

 Ua Color  neg      

 

 Ua Appera  neg      

 

 Ua WBC  neg      

 

 Ua Protein  neg      

 

 Ua Glucose  neg      

 

 Ua Ketones  neg      

 

 Ua Bilirubin  neg      

 

 Ua Urobilinogen  neg      

 

 Ua Nitrite  neg      

 

 Ua Occult Blood  neg      









 Laboratory test finding  10/23/2012  [facility] [place] [facility]  Alt  26 U/L    14-
54  47



     [address] [place] [address] [phone]          









 Ast  25 U/L    12-42  48

 

 TSH (Thyroid Stimulating Horm)  2.57 MIU/ML    0.34-5.60  49

 

 Vitamin B12  336 pg/mL    180-914  50









 Lipid Profile  10/23/2012  [facility]  Triglycerides  95 mg/dL    40
-200  



 (Trig/Chol/HDL)    [address] [place] [address] [phone]          









 Cholesterol  157 mg/dL    Less than 200  51

 

 HDL Cholesterol  57 mg/dL    40-60  52

 

 Cholesterol/HDL Ratio  2.8 AVERAGE    1-4.44  

 

 LDL Cholesterol  81.0 mg/dL    Less Than 100  









 Comp Metabolic Panel  2012  [facility]  Sodium  140 mmol/L    
135-145  



     [address] [place] [address] [phone]          









 Potassium  4.1 mmol/L    3.5-5.0  

 

 Chloride  105 mmol/L    101-111  

 

 Co2 (Carbon Dioxide)  31.0 mmol/L    22-32  

 

 Anion Gap  4.0 mmol/L    2-11  53

 

 Glucose  96 mg/dL      

 

 BUN  16 mg/dL    6-24  

 

 Creatinine  0.8 mg/dL    0.50-1.40  

 

 One Over Creatinine  1.25      

 

 BUN/Creatinine Ratio  20.0    8-20  

 

 Calcium  9.3 mg/dL    8.1-9.9  

 

 Total Protein  6.2 GM/DL    6.2-8.1  

 

 Albumin  3.8 GM/DL    3.2-5.2  

 

 Globulin  2.4 GM/DL    2-4  

 

 Albumin/Globulin Ratio  1.6    1-3  

 

 Bilirubin Total  0.5 mg/dL    0.4-1.5  54

 

 Alkaline Phosphatase  77 U/L      

 

 Alt (SGPT)  28 U/L    14-54  

 

 Ast (Sgot)  27 U/L    12-42  

 

 eGFR Non-  68.8    > 60  

 

 eGFR   88.5    > 60  55









 Lipid Profile  2012  [facility]  Triglyceride  137 mg/dL    40
-200  



 (Trig/Chol/HDL)    [address]          



     [place] [address] [phone]          









 Cholesterol  184 mg/dL    Less Than 200  56

 

 High Density Lipoprotein  50 mg/dL    40-60  57

 

 Cholesterol/HDL Ratio  3.68 AVERAGE    1-4.44  

 

 Low Density Lipoprotein  107 mg/dL  High  Less Than 100  58









 Lipid Profile  2011  [facility]  Triglyceride  92 mg/dL    40-
200  



 (Trig/Chol/HDL)    [address]          



     [place] [address] [phone]          









 Cholesterol  156 mg/dL    Less Than 200  59

 

 High Density Lipoprotein  46 mg/dL    40-60  60

 

 Cholesterol/HDL Ratio  3.39 AVERAGE    1-4.44  

 

 Low Density Lipoprotein  92 mg/dL    Less Than 100  61









 Comp Metabolic Panel  2011  [facility]  Sodium  142 mmol/L    
135-145  



     [address]          



     [place] [address] [phone]          









 Potassium  4.6 mmol/L    3.5-5.0  

 

 Chloride  107 mmol/L    101-111  

 

 Co2 (Carbon Dioxide)  30.0 mmol/L    22-32  

 

 Anion Gap  5.0 mmol/L    2-11  62

 

 Glucose  103 mg/dL  High    

 

 BUN  12 mg/dL    6-24  

 

 Creatinine  0.7 mg/dL    0.50-1.40  

 

 One Over Creatinine  1.42      

 

 BUN/Creatinine Ratio  17.1    8-20  

 

 Calcium  9.4 mg/dL    8.1-9.9  

 

 Total Protein  6.3 GM/DL    6.2-8.1  

 

 Albumin  3.7 GM/DL    3.2-5.2  

 

 Globulin  2.6 GM/DL    2-4  

 

 Albumin/Globulin Ratio  1.4    1-3  

 

 Bilirubin Total  0.5 mg/dL    0.4-1.5  63

 

 Alkaline Phosphatase  61 U/L      

 

 Alt (SGPT)  25 U/L    14-54  

 

 Ast (Sgot)  28 U/L    12-42  

 

 eGFR Non-  80.3    > 60  

 

 eGFR   103.3    > 60  64









 Manual Differential  2011  [facility]  Polysegmented  82 %    
38-83  



     [address]  Neutrophil        



     [place] [address] [phone]          









 Band Neutrophil  4 %    0-8  

 

 Lymphocyte  11 %  Low  25-47  

 

 Monocyte  3 %    0-13  

 

 Absolute Neutrophil Count  9.9      

 

 RBC Morphology  NORMAL      









 CBC With  2011  [facility]  White Blood  11.6 CUMM  High  4.8-
10.8  



 Electronic Diff    [address]  Count        



     [place] [address] [phone]          









 Red Cell Count  3.99 CUMM  Low  4.2-5.4  

 

 Hemoglobin  12.9 g/dL    12.0-16.0  

 

 Hematocrit  38 %    35-47  

 

 Mean Corpuscular Volume  95 um3    79-97  

 

 Mean Corpuscular Hemoglob  32 pg  High  27-31  

 

 Mean Corpuscular HGB Cone  34 g/dL    32-36  

 

 Redcell Distribution WDTH  14 %    10.5-15  

 

 Platelet Count  259 CUMM    150-450  

 

 Mean Platelet Volume  8.4 um3    7.4-10.4  65









 Comp Metabolic Panel  2011  [facility]  Sodium  137 mmol/L    
135-145  



     [address]          



     [place] [address] [phone]          









 Potassium  4.4 mmol/L    3.5-5.0  

 

 Chloride  101 mmol/L    101-111  

 

 Co2 (Carbon Dioxide)  27.0 mmol/L    22-32  

 

 Anion Gap  9.0 mmol/L    2-11  66

 

 Glucose  80 mg/dL      

 

 BUN  10 mg/dL    6-24  

 

 Creatinine  0.80 mg/dL    0.50-1.40  

 

 One Over Creatinine  1.20      

 

 BUN/Creatinine Ratio  12.5    8-20  

 

 Calcium  9.1 mg/dL    8.1-9.9  

 

 Total Protein  6.1 GM/DL  Low  6.2-8.1  

 

 Albumin  3.4 GM/DL    3.2-5.2  

 

 Globulin  2.7 GM/DL    2-4  

 

 Albumin/Globulin Ratio  1.3    1-3  

 

 Bilirubin Total  0.7 mg/dL    0.4-1.5  67

 

 Alkaline Phosphatase  75 U/L      

 

 Alt (SGPT)  24 U/L    14-54  

 

 Ast (Sgot)  25 U/L    12-42  

 

 eGFR Non-  73.4    > 60  

 

 eGFR   88.8    > 60  68









 Urine Culture &  2010  [facility]  Urine Culture  NG      69



 Sensitivi    [address]  Sensitivi        



     [place] [address] [phone]          

 

 Laboratory test  2010  [facility]  Amylase  48 U/L      
70



 finding    [address]          



     [place] [address] [phone]          









 Lipase  18 U/L  Low  22-51  

 

 C Reactive Protein  2.3 mg/dL  High  Less Than 0.5  









 Laboratory test  2010  [facility]  Stool For  NEGATIVE    
Negative  



 finding    [address]  Blood        



     [place] [address] [phone]          

 

 CBC With  2010  [facility]  White Blood  6.6 CUMM    4.8-10.8
  



 Electronic Diff    [address]  Count        



     [place] [address] [phone]          









 Red Cell Count  4.11 CUMM  Low  4.2-5.4  

 

 Hemoglobin  13.4 g/dL    12.0-16.0  

 

 Hematocrit  39 %    35-47  

 

 Mean Corpuscular Volume  95 um3    79-97  

 

 Mean Corpuscular Hemoglob  33 pg  High  27-31  

 

 Mean Corpuscular HGB Cone  34 g/dL    32-36  

 

 Redcell Distribution WDTH  13 %    10.5-15  

 

 Platelet Count  239 CUMM    150-450  

 

 Mean Platelet Volume  7.4 um3    7.4-10.4  

 

 Gran %  70.8 %    38-83  

 

 Lymph %  18.7 %  Low  25-47  

 

 Mononuclear %  9.7 %  High  1-9  

 

 Eosinophil %  0.8 %    0-6  

 

 Basophil %  0 %    0-2  

 

 Abs Lymphs  1.2    1.0-4.8  

 

 Abs Mononuclear  0.6    0-0.8  

 

 Absolute Neutrophil Count  4.7    1.5-7.7  

 

 Abs Eosinophils  0.1    0-0.6  

 

 Abs Basophils  0    0-0.2  71









 Urinalysis W/Microscopic  2010  [facility] [place] [facility]  Ua Color  YELLOW 
   Yellow  



     [address]          



     [place] [address] [phone]          









 Appearance-Urine  CLEAR    Clear  

 

 Specific Gravity-Ur  1.027    1.010-1.030  

 

 Esterase-Urine  1+  Abnormal  Negative  

 

 Nitrite  NEGATIVE    Negative  

 

 Urobilinogen-Ur-DIP  NEGATIVE    Negative  

 

 Protein-Urine  TRACE  Abnormal  Negative  

 

 PH-Urine  5.5    5-9  

 

 Blood-Urine  TRACE  Abnormal  Negative  

 

 Ketones-Urine  NEGATIVE    Negative  

 

 Bilirubin-Ur  NEGATIVE    Negative  

 

 Glucose-Urine  NEGATIVE    Negative  

 

 WBC-Urine  5-10  Abnormal  0-5  

 

 RBC-Urine  0-2    0-2  

 

 Mucus Urine  MODERATE    None  

 

 Epith Cells-Ur  FEW    None  









 Comp Metabolic Panel  2010  [facility]  Sodium  137 mmol/L    
135-145  



     [address]          



     [place] [address] [phone]          









 Potassium  4.3 mmol/L    3.5-5.0  

 

 Chloride  106 mmol/L    101-111  

 

 Co2 (Carbon Dioxide)  24.0 mmol/L    22-32  

 

 Anion Gap  7.0 mmol/L    2-11  72

 

 Glucose  110 mg/dL  High    

 

 BUN  18 mg/dL    6-24  

 

 Creatinine  0.70 mg/dL    0.50-1.40  

 

 One Over Creatinine  1.40      

 

 BUN/Creatinine Ratio  25.7  High  8-20  

 

 Calcium  8.8 mg/dL    8.1-9.9  

 

 Total Protein  6.2 GM/DL    6.2-8.1  

 

 Albumin  3.6 GM/DL    3.2-5.2  

 

 Globulin  2.6 GM/DL    2-4  

 

 Albumin/Globulin Ratio  1.4    1-3  

 

 Bilirubin Total  0.7 mg/dL    0.4-1.5  73

 

 Alkaline Phosphatase  62 U/L      

 

 Alt (SGPT)  23 U/L    14-54  

 

 Ast (Sgot)  25 U/L    12-42  

 

 eGFR Non-  85.6    > 60  

 

 eGFR   103.5    > 60  74









 Comp Metabolic Panel  2010  [facility] [place] [facility]  Sodium  141 mmol/L    
135-145  



     [address]          



     [place] [address] [phone]          









 Potassium  4.1 mmol/L    3.5-5.0  

 

 Chloride  106 mmol/L    101-111  

 

 Co2 (Carbon Dioxide)  29.0 mmol/L    22-32  

 

 Anion Gap  6.0 mmol/L    2-11  75

 

 Glucose  85 mg/dL      76

 

 BUN  19 mg/dL    6-24  

 

 Creatinine  0.70 mg/dL    0.50-1.40  

 

 One Over Creatinine  1.40      

 

 BUN/Creatinine Ratio  27.1  High  8-20  

 

 Calcium  9.5 mg/dL    8.1-9.9  77

 

 Total Protein  6.4 GM/DL    6.2-8.1  

 

 Albumin  3.9 GM/DL    3.2-5.2  

 

 Globulin  2.5 GM/DL    2-4  

 

 Albumin/Globulin Ratio  1.6    1-3  

 

 Bilirubin Total  0.7 mg/dL    0.4-1.5  78

 

 Alkaline Phosphatase  52 U/L      

 

 Alt (SGPT)  23 U/L    14-54  

 

 Ast (Sgot)  22 U/L    12-42  

 

 eGFR Non-  85.6    > 60  

 

 eGFR   103.5    > 60  79









 Lipid Profile  2010  [facility]  Triglyceride  182 mg/dL    40
-200  



 (Trig/Chol/HDL)    [address]          



     [place] [address] [phone]          









 Cholesterol  156 mg/dL    Less Than 200  80

 

 High Density Lipoprotein  37 mg/dL  Low  40-60  81

 

 Cholesterol/HDL Ratio  4.22 AVERAGE    1-4.44  

 

 Low Density Lipoprotein  83 mg/dL    Less Than 100  82









 1  *Ascorbic acid is present which may interfere with detection



   of blood.

 

 2  Standard intensity warfarin therapeutic range: 2.0-3.0



   High intensity warfarin therapeutic range: 2.5-3.5

 

 3  Desirable: <150



   Borderline High: 150-199



   High: 200-499



   Very High: >500

 

 4  Desirable: <200



   Borderline High: 200-239



   High: >239

 

 5  Low: <40



   Desirable: 40-60



   High: >60

 

 6  Desirable: <100



   Near Optimal: 100-129



   Borderline High: 130-159



   High: 160-189



   Very High: >189

 

 7  *******Because ethnic data is not always readily available,



   this report includes an eGFR for both -Americans and



   non- Americans.****



   The National Kidney Disease Education Program (NKDEP) does



   not endorse the use of the MDRD equation for patients that



   are not between the ages of 18 and 70, are pregnant, have



   extremes of body size, muscle mass, or nutritional status,



   or are non- or non-.



   According to the National Kidney Foundation, irrespective of



   diagnosis, the stage of the disease is based on the level of



   kidney function:



   Stage Description                      GFR(mL/min/1.73 m(2))



   1     Kidney damage with normal or decreased GFR       90



   2     Kidney damage with mild decrease in GFR          60-89



   3     Moderate decrease in GFR                         30-59



   4     Severe decrease in GFR                           15-29



   5     Kidney failure                       <15 (or dialysis)

 

 8  *******Because ethnic data is not always readily available,



   this report includes an eGFR for both -Americans and



   non- Americans.****



   The National Kidney Disease Education Program (NKDEP) does



   not endorse the use of the MDRD equation for patients that



   are not between the ages of 18 and 70, are pregnant, have



   extremes of body size, muscle mass, or nutritional status,



   or are non- or non-.



   According to the National Kidney Foundation, irrespective of



   diagnosis, the stage of the disease is based on the level of



   kidney function:



   Stage Description                      GFR(mL/min/1.73 m(2))



   1     Kidney damage with normal or decreased GFR       90



   2     Kidney damage with mild decrease in GFR          60-89



   3     Moderate decrease in GFR                         30-59



   4     Severe decrease in GFR                           15-29



   5     Kidney failure                       <15 (or dialysis)

 

 9  Desirable: <150



   Borderline High: 150-199



   High: 200-499



   Very High: >500

 

 10  Desirable: <200



   Borderline High: 200-239



   High: >239

 

 11  Low: <40



   Desirable: 40-60



   High: >60

 

 12  Desirable: <100



   Near Optimal: 100-129



   Borderline High: 130-159



   High: 160-189



   Very High: >189

 

 13  RESULT: No apparent monoclonal protein on serum electrophoresis.



   Test Performed by:



   [address] [address]

 

 14  Desirable <150



   Borderline high 150-199



   High 200-499



   Very High >500

 

 15  Desirable <200



   Borderline high 200-239



   High >239

 

 16  Low <40



   Desirable: 40-60



   High: >60

 

 17  Desirable: <100 mg/dL



   Near Optimal: 100-129 mg/dL



   Borderline High: 130-159 mg/dL



   High: 160-189 mg/dL



   Very High: >189 mg/dL

 

 18  *******Because ethnic data is not always readily available,



   this report includes an eGFR for both -Americans and



   non- Americans.****



   The National Kidney Disease Education Program (NKDEP) does



   not endorse the use of the MDRD equation for patients that



   are not between the ages of 18 and 70, are pregnant, have



   extremes of body size, muscle mass, or nutritional status,



   or are non- or non-.



   According to the National Kidney Foundation, irrespective of



   diagnosis, the stage of the disease is based on the level of



   kidney function:



   Stage Description                      GFR(mL/min/1.73 m(2))



   1     Kidney damage with normal or decreased GFR       90



   2     Kidney damage with mild decrease in GFR          60-89



   3     Moderate decrease in GFR                         30-59



   4     Severe decrease in GFR                           15-29



   5     Kidney failure                       <15 (or dialysis)

 

 19  SEE RESULT BELOW



   -----------------------------------------------------------------------------
---------------



   Name:  SHIELA SHEETS                 : 1930    Attend Dr: 
Tracy Carlin MD



   Acct:  [id number]  Unit: [id number]  AGE: 87            Location:  [facility]



   Re17                        SEX: F             Status:    REG REF



   -----------------------------------------------------------------------------
---------------



   



   SPEC: [id number]         ALEAH:       [facility] DR: Tracy Carlin MD



   REQ:  [id number]              RECD:   



   STATUS: COMP



   _



   SOURCE: URINE          SPDESC:



   ORDERED:  Urine Culture



   COMMENTS: [id number]



   Urine Source: Random



   



   -----------------------------------------------------------------------------
---------------



   Procedure                         Result                         Reported   
        Site



   -----------------------------------------------------------------------------
---------------



   Urine Culture  Final                                             17-
1605      ML



   No Growth (<1,000 CFU/mL)



   



   -----------------------------------------------------------------------------
---------------



   * ML - MAIN LAB ([facility])



   .



   



   



   



   



   



   



   



   



   



   



   



   



   



   



   



   



   



   



   



   



   



   



   



   



   ** END OF REPORT **



   



   * ML=Testing performed at Main Lab



   DEPARTMENT OF PATHOLOGY,  [address] [place] [address]



   Phone # [phone]      Fax #[phone]



   Onel Hernandez M.D. Director     [facility] # [id number]

 

 20  Desirable <150



   Borderline high 150-199



   High 200-499



   Very High >500

 

 21  Desirable <200



   Borderline high 200-239



   High >239

 

 22  Low <40



   Desirable: 40-60



   High: >60

 

 23  Desirable: <100 mg/dL



   Near Optimal: 100-129 mg/dL



   Borderline High: 130-159 mg/dL



   High: 160-189 mg/dL



   Very High: >189 mg/dL

 

 24  *******Because ethnic data is not always readily available,



   this report includes an eGFR for both -Americans and



   non- Americans.****



   The National Kidney Disease Education Program (NKDEP) does



   not endorse the use of the MDRD equation for patients that



   are not between the ages of 18 and 70, are pregnant, have



   extremes of body size, muscle mass, or nutritional status,



   or are non- or non-.



   According to the National Kidney Foundation, irrespective of



   diagnosis, the stage of the disease is based on the level of



   kidney function:



   Stage Description                      GFR(mL/min/1.73 m(2))



   1     Kidney damage with normal or decreased GFR       90



   2     Kidney damage with mild decrease in GFR          60-89



   3     Moderate decrease in GFR                         30-59



   4     Severe decrease in GFR                           15-29



   5     Kidney failure                       <15 (or dialysis)

 

 25  *******Because ethnic data is not always readily available,



   this report includes an eGFR for both -Americans and



   non- Americans.****



   The National Kidney Disease Education Program (NKDEP) does



   not endorse the use of the MDRD equation for patients that



   are not between the ages of 18 and 70, are pregnant, have



   extremes of body size, muscle mass, or nutritional status,



   or are non- or non-.



   According to the National Kidney Foundation, irrespective of



   diagnosis, the stage of the disease is based on the level of



   kidney function:



   Stage Description                      GFR(mL/min/1.73 m(2))



   1     Kidney damage with normal or decreased GFR       90



   2     Kidney damage with mild decrease in GFR          60-89



   3     Moderate decrease in GFR                         30-59



   4     Severe decrease in GFR                           15-29



   5     Kidney failure                       <15 (or dialysis)

 

 26  Desirable <150



   Borderline high 150-199



   High 200-499



   Very High >500

 

 27  Desirable <200



   Borderline high 200-239



   High >239

 

 28  Low <40



   Desirable: 40-60



   High: >60

 

 29  Desirable: <100 mg/dL



   Near Optimal: 100-129 mg/dL



   Borderline High: 130-159 mg/dL



   High: 160-189 mg/dL



   Very High: >189 mg/dL

 

 30  Reference Range and Interpretation:



   TnI (ng/mL)             Interpretation



   Less Than 0.03 ng/mL    Not supportive of diagnosis of MI



   0.03 - 0.50 ng/mL       Indeterminate: suggest serial



   studies if clinically indicated.



   Greater than 0.5 ng/mL  Consistent with diagnosis of MI

 

 31  >100 to <200 pg/mL: likely compensated congestive heart



   failure (CHF)



   200 to 400 pg/mL: likely moderate CHF



   >400 pg/mL: likely moderate to severe CHF

 

 32  PT IS FASTING

 

 33  Desirable <150



   Borderline high 150-199



   High 200-499



   Very High >500

 

 34  Desirable <200



   Borderline high 200-239



   High >239

 

 35  Low <40



   Desirable: 40-60



   High: >60

 

 36  Desirable <100



   Near Optimal 100-129



   Borderline high 130-159



   High 160-189



   Very High >189

 

 37  **Potassium reference range changed effective 14**

 

 38  *******Because ethnic data is not always readily available,



   this report includes an eGFR for both -Americans and



   non- Americans.****



   The National Kidney Disease Education Program (NKDEP) does



   not endorse the use of the MDRD equation for patients that



   are not between the ages of 18 and 70, are pregnant, have



   extremes of body size, muscle mass, or nutritional status,



   or are non- or non-.



   According to the National Kidney Foundation, irrespective of



   diagnosis, the stage of the disease is based on the level of



   kidney function:



   Stage Description                      GFR(mL/min/1.73 m(2))



   1     Kidney damage with normal or decreased GFR       90



   2     Kidney damage with mild decrease in GFR          60-89



   3     Moderate decrease in GFR                         30-59



   4     Severe decrease in GFR                           15-29



   5     Kidney failure                       <15 (or dialysis)

 

 39  Comment: collected by micheline romano

 

 40  RUN DATE: 05/15/14               [facility] **LIVE**       
         PAGE    1



   RUN TIME: 900             [address] [place] [address]   [id number]



   Specimen Inquiry



   



   -----------------------------------------------------------------------------
---------------



   Name:  SHEETSSHIELA R               : 1930    Attend Dr: Galdino Heredia MD



   Acct:  [id number]  Unit: [id number]  AGE: 83            Location:  [facility]



   Re14                        SEX: F             Status:    DEP ER



   -----------------------------------------------------------------------------
---------------



   



   SPEC: [id number]         ALEAH:       [facility] DR: Galdino Heredia MD



   REQ:  [id number]              RECD:   121



   STATUS: COMP             [facility] DR: Tracy Carlin MD



   _



   SOURCE: THROAT         SPDESC:



   ORDERED:  Throat Beta Str



   COMMENTS: Comment: collected by micheline romano



   



   -----------------------------------------------------------------------------
---------------



   Procedure                         Result                         Verified   
        Site



   -----------------------------------------------------------------------------
---------------



   Throat Beta Strep Culture  Final                                 05/15/14-
0900      ML



   Negative For Group A Beta Streptococcus



   



   -----------------------------------------------------------------------------
---------------



   



   



   



   



   



   



   



   



   



   



   



   



   



   



   



   



   



   



   



   



   



   



   



   



   



   



   



   



   ** END OF REPORT **



   



   * ML=Testing performed at Main Lab



   DEPARTMENT OF PATHOLOGY,  [facility] [employer] [place] [address]



   Phone # [phone]      Fax #[phone]



   Onel Hernandez M.D. Director     [facility] # [id number]

 

 41  RUN DATE: 14               [facility] **LIVE**       
         PAGE    1



   RUN TIME: 1357             [facility] [employer] [address], [place]   [address]



   Specimen Inquiry



   



   -----------------------------------------------------------------------------
---------------



   Name:  SHIELA SHEETS               : 1930    Attend Dr: Mario Bergman MD



   Acct:  [id number]  Unit: [id number]  AGE: 83            Location:  OR



   Re14                        SEX: F             Status:    REG SDC



   -----------------------------------------------------------------------------
---------------



   



   SPEC: [id number]             ALEAH: 14-          SUBM DR: Mario Bergman MD



   REQ:  [id number]             RECD: 1507



   STATUS: DEBI SPARKS DR: Tracy Carlin MD



   _



   ORDERED:  CONSULT W/ FS, FS ADDITIONAL, LEVEL IV



   



   FINAL DIAGNOSIS



   



   Skin, right upper cutaneous lip, biopsy:



   A. Basal cell carcinoma, nodular type.



   B. Deep, tip, and lateral margins of resection are clear.



   



   



   



   



   



   



   PATHOLOGY SURGICAL CONSULT



   



   Frozen section (FS)/Touch Prep (TP)/Gross Consult (GC)



   



   FS: Right upper cutaneous lip:



   A. Basal cell carcinoma (DS).



   B. Margins clear (DS).



   



   



   PRE-OPERATIVE DIAGNOSIS



   



   Basal cell carcinoma right upper cutaneous lip, suture marks twelve o'clock 
superior margin.



   



   GROSS DESCRIPTION



   



   The specimen is received fresh labeled Shiela Sheets, Excision BCC 
Right Upper



   Cutaneous Lip Suture Marks Twelve O'clock Superior Margin and consists of a 
2.1 x 0.8 cm.



   tan-pink skin ellipse excised to a depth of 0.5 cm.  There is a suture 
attached to one long



   axis which designates Twelve o'clock.  The specimen is inked as follows: 
nine o'clock half -



   black, three o'clock half - blue, and twelve o'clock tip - green, serially 
sectioned from



   twelve o'clock to six o'clock and entirely submitted for frozen section 
microscopy in



   cassettes FSA and FSB.  The frozen section residue is entirely submitted in 
cassettes FSA



   and FSB to include ellipse ends in cassette FSA.



   



   Signed __________(signature on file)___________ Onel Hernandez MD  [id number]



   -----------------------------------------------------------------------------
---------------



   



   ** END OF REPORT **



   



   * ML=Testing performed at Main Lab



   DEPARTMENT OF PATHOLOGY,  [facility] [employer] [place], [place] [address]



   Phone # [phone]      Fax #[phone]



   Onel Hernandez M.D. Director     [place] [employer]  #[id number]

 

 42  RUN DATE: 04/15/14               [facility] **LIVE**       
         PAGE    1



   RUN TIME: 151             [facility] [employer] [address], [place]   [address]



   Specimen Inquiry



   



   -----------------------------------------------------------------------------
---------------



   Name:  SHIELA SHEETS               : 1930    Attend Dr: Mario Bergman MD



   Acct:  [id number]  Unit: [id number]  AGE: 83            Location:  [facility]



   Re/15/14                        SEX: F             Status:    REG REF



   -----------------------------------------------------------------------------
---------------



   



   SPEC: [id number]             ALEAH: 04/15/14-          SUBM DR: Mario Bergman MD



   REQ:  [id number]             RECD: 04/15/



   STATUS: DEBI SPARKS DR: Tracy Ling MD



   _



   ORDERED:  Consult w/slide



   



   FINAL DIAGNOSIS



   



   Skin, right nasolabial fold, shave biopsy:



   A. Basal cell carcinoma, nodular type.



   B. Basal cell carcinoma is broadly transected along the base of this shave 
biopsy.



   



   



   



   



   



   



   PRE-OPERATIVE DIAGNOSIS



   



   Basal cell carcinoma.



   



   GROSS DESCRIPTION



   



   Received is one glass slide labeled [id number], Shiela Sheets [place].
  Accompanying



   this slide is a pathology report labeled Alejandro Tracy, [id number] [employer],



   [address], [address], [place] [address] signed by Dr. Stuart Doss on



   2014.



   



   Signed __________(signature on file)___________ Onel Hernandez MD 04/15/
14 1518



   



   -----------------------------------------------------------------------------
---------------



   



   



   



   



   



   



   



   



   



   



   



   ** END OF REPORT **



   



   * ML=Testing performed at Main Lab



   DEPARTMENT OF PATHOLOGY,  [address] [place] [address]



   Phone # [phone]      Fax #[phone]



   Onel Hernandez M.D. Director     [place] [employer] Perm[employer]  #[id number]

 

 43  HDL Interpretation:



   Undesirable: High Risk:  Less than 40 mg/dL



   Desirable:  Low Risk:  Greater than 60 mg/dL

 

 44  LDL Interpretation:



   Low Risk Optimal Level:  LDL Less than 100 mg/dL



   Near or Above Optimal:  -129 mg/dL



   Borderline High Risk:  -159 mg/dL



   High Risk:  -189 mg/dL



   Very High Risk:  LDL Greater than 189 mg/dL

 

 45  *******Because ethnic data is not always readily available,



   this report includes an eGFR for both -Americans and



   non- Americans.****



   The National Kidney Disease Education Program (NKDEP) does



   not endorse the use of the MDRD equation for patients that



   are not between the ages of 18 and 70, are pregnant, have



   extremes of body size, muscle mass, or nutritional status,



   or are non- or non-.



   According to the National Kidney Foundation, irrespective of



   diagnosis, the stage of the disease is based on the level of



   kidney function:



   Stage Description                      GFR(mL/min/1.73 m(2))



   1     Kidney damage with normal or decreased GFR       90



   2     Kidney damage with mild decrease in GFR          60-89



   3     Moderate decrease in GFR                         30-59



   4     Severe decrease in GFR                           15-29



   5     Kidney failure                       <15 (or dialysis)

 

 46  *******Because ethnic data is not always readily available,



   this report includes an eGFR for both -Americans and



   non- Americans.****



   The National Kidney Disease Education Program (NKDEP) does



   not endorse the use of the MDRD equation for patients that



   are not between the ages of 18 and 70, are pregnant, have



   extremes of body size, muscle mass, or nutritional status,



   or are non- or non-.



   According to the National Kidney Foundation, irrespective of



   diagnosis, the stage of the disease is based on the level of



   kidney function:



   Stage Description                      GFR(mL/min/1.73 m(2))



   1     Kidney damage with normal or decreased GFR       90



   2     Kidney damage with mild decrease in GFR          60-89



   3     Moderate decrease in GFR                         30-59



   4     Severe decrease in GFR                           15-29



   5     Kidney failure                       <15 (or dialysis)

 

 47  PT IS FASTING

 

 48  PT IS FASTING

 

 49  PT IS FASTING

 

 50  PT IS FASTING

 

 51  Desirable:  Less than 200 MG/DL



   Borderline-High Risk:  200-239 MG/DL



   High-Risk:  240 MG/DL and over

 

 52  HDL Interpretation:



   Undesirable: High Risk:  Less than 40 MG/DL



   Desirable:  Low Risk:  Greater than 60 MG/DL

 

 53  Anion gap measurement may be of limited value in the



   presence of any alkalosis, especially in a combined acid



   base disorder.



   .

 

 54  A metabolite of Naproxen, O-desmethylnaproxen, has been



   shown to interfere with the [place]endrassik-[place] method for



   measuring total bilirubin.  Samples from patients who have



   taken Naproxen have shown spurious elevation in total



   bilirubin levels.

 

 55  *******Because ethnic data is not always readily available,



   this report includes an eGFR for both -Americans and



   non- Americans.****



   The National Kidney Disease Education Program (NKDEP) does



   not endorse the use of the MDRD equation for patients that



   are not between the ages of 18 and 70, are pregnant, have



   extremes of body size, muscle mass, or nutritional status,



   or are non- or non-.



   According to the National Kidney Foundation, irrespective of



   diagnosis, the stage of the disease is based on the level of



   kidney function:



   Stage Description                      GFR(mL/min/1.73 m(2))



   1     Kidney damage with normal or decreased GFR       90



   2     Kidney damage with mild decrease in GFR          60-89



   3     Moderate decrease in GFR                         30-59



   4     Severe decrease in GFR                           15-29



   5     Kidney failure                       <15 (or dialysis)

 

 56  CHOLESTEROL INTERPRETATION:



   Desirable:  Less than 200 MG/DL



   Borderline-High Risk:  200-239 MG/DL



   High-Risk:  240 MG/DL and over

 

 57  HDL INTERPRETATION:



   Undesirable: High Risk:  Less than 40 MG/DL



   Desirable:  Low Risk:  Greater than 60 MG/DL

 

 58  LDL INTERPRETATION:



   Low Risk Optimal Level:  LDL Less than 100 MG/DL



   Near or Above Optimal:  -129 MG/DL



   Borderline High Risk:  -159 MG/DL



   High Risk:  -189 MG/DL



   Very High Risk:  LDL Greater than 189 MG/DL

 

 59  CHOLESTEROL INTERPRETATION:



   Desirable:  Less than 200 MG/DL



   Borderline-High Risk:  200-239 MG/DL



   High-Risk:  240 MG/DL and over

 

 60  HDL INTERPRETATION:



   Undesirable: High Risk:  Less than 40 MG/DL



   Desirable:  Low Risk:  Greater than 60 MG/DL

 

 61  LDL INTERPRETATION:



   Low Risk Optimal Level:  LDL Less than 100 MG/DL



   Near or Above Optimal:  -129 MG/DL



   Borderline High Risk:  -159 MG/DL



   High Risk:  -189 MG/DL



   Very High Risk:  LDL Greater than 189 MG/DL

 

 62  Anion gap measurement may be of limited value in the



   presence of any alkalosis, especially in a combined acid



   base disorder.



   .

 

 63  A metabolite of Naproxen, O-desmethylnaproxen, has been



   shown to interfere with the [place] method for



   measuring total bilirubin.  Samples from patients who have



   taken Naproxen have shown spurious elevation in total



   bilirubin levels.

 

 64  *******Because ethnic data is not always readily available,



   this report includes an eGFR for both -Americans and



   non- Americans.****



   The National Kidney Disease Education Program (NKDEP) does



   not endorse the use of the MDRD equation for patients that



   are not between the ages of 18 and 70, are pregnant, have



   extremes of body size, muscle mass, or nutritional status,



   or are non- or non-.



   According to the National Kidney Foundation, irrespective of



   diagnosis, the stage of the disease is based on the level of



   kidney function:



   Stage Description                      GFR(mL/min/1.73 m(2))



   1     Kidney damage with normal or decreased GFR       90



   2     Kidney damage with mild decrease in GFR          60-89



   3     Moderate decrease in GFR                         30-59



   4     Severe decrease in GFR                           15-29



   5     Kidney failure                       <15 (or dialysis)

 

 65  Lymphopenia %

 

 66  Anion gap measurement may be of limited value in the



   presence of any alkalosis, especially in a combined acid



   base disorder.



   .

 

 67  A metabolite of Naproxen, O-desmethylnaproxen, has been



   shown to interfere with the Jendrassik-Anali method for



   measuring total bilirubin.  Samples from patients who have



   taken Naproxen have shown spurious elevation in total



   bilirubin levels.

 

 68  *******Because ethnic data is not always readily available,



   this report includes an eGFR for both -Americans and



   non- Americans.****



   The National Kidney Disease Education Program (NKDEP) does



   not endorse the use of the MDRD equation for patients that



   are not between the ages of 18 and 70, are pregnant, have



   extremes of body size, muscle mass, or nutritional status,



   or are non- or non-.



   According to the National Kidney Foundation, irrespective of



   diagnosis, the stage of the disease is based on the level of



   kidney function:



   Stage Description                      GFR(mL/min/1.73 m(2))



   1     Kidney damage with normal or decreased GFR       90



   2     Kidney damage with mild decrease in GFR          60-89



   3     Moderate decrease in GFR                         30-59



   4     Severe decrease in GFR                           15-29



   5     Kidney failure                       <15 (or dialysis)

 

 69  FINAL: NO GROWTH DAY 2 (<1,000 CFU/mL)

 

 70  PLEASE NOTE NEW REFERENCE RANGE.

 

 71  Lymphopenia %

 

 72  Anion gap measurement may be of limited value in the



   presence of any alkalosis, especially in a combined acid



   base disorder.



   .

 

 73  A metabolite of Naproxen, O-desmethylnaproxen, has been



   shown to interfere with the Jendrassik-[place] method for



   measuring total bilirubin.  Samples from patients who have



   taken Naproxen have shown spurious elevation in total



   bilirubin levels.

 

 74  *******Because ethnic data is not always readily available,



   this report includes an eGFR for both -Americans and



   non- Americans.****



   The National Kidney Disease Education Program (NKDEP) does



   not endorse the use of the MDRD equation for patients that



   are not between the ages of 18 and 70, are pregnant, have



   extremes of body size, muscle mass, or nutritional status,



   or are non- or non-.



   According to the National Kidney Foundation, irrespective of



   diagnosis, the stage of the disease is based on the level of



   kidney function:



   Stage Description                      GFR(mL/min/1.73 m(2))



   1     Kidney damage with normal or decreased GFR       90



   2     Kidney damage with mild decrease in GFR          60-89



   3     Moderate decrease in GFR                         30-59



   4     Severe decrease in GFR                           15-29



   5     Kidney failure                       <15 (or dialysis)

 

 75  Anion gap measurement may be of limited value in the



   presence of any alkalosis, especially in a combined acid



   base disorder.



   .

 

 76  ** Note change in reference range as of 08.  The



   change was based on recommendations from the American



   Diabetes Association.

 

 77  Please note change in reference range effective 08



   .

 

 78  A metabolite of Naproxen, O-desmethylnaproxen, has been



   shown to interfere with the Jendrassik-Anali method for



   measuring total bilirubin.  Samples from patients who have



   taken Naproxen have shown spurious elevation in total



   bilirubin levels.

 

 79  *******Because ethnic data is not always readily available,



   this report includes an eGFR for both -Americans and



   non- Americans.****



   The National Kidney Disease Education Program (NKDEP) does



   not endorse the use of the MDRD equation for patients that



   are not between the ages of 18 and 70, are pregnant, have



   extremes of body size, muscle mass, or nutritional status,



   or are non- or non-.



   According to the National Kidney Foundation, irrespective of



   diagnosis, the stage of the disease is based on the level of



   kidney function:



   Stage Description                      GFR(mL/min/1.73 m(2))



   1     Kidney damage with normal or decreased GFR       90



   2     Kidney damage with mild decrease in GFR          60-89



   3     Moderate decrease in GFR                         30-59



   4     Severe decrease in GFR                           15-29



   5     Kidney failure                       <15 (or dialysis)

 

 80  CHOLESTEROL INTERPRETATION:



   Desirable:  Less than 200 MG/DL



   Borderline-High Risk:  200-239 MG/DL



   High-Risk:  240 MG/DL and over

 

 81  HDL INTERPRETATION:



   Undesirable: High Risk:  Less than 40 MG/DL



   Desirable:  Low Risk:  Greater than 60 MG/DL

 

 82  LDL INTERPRETATION:



   Low Risk Optimal Level:  LDL Less than 100 MG/DL



   Near or Above Optimal:  -129 MG/DL



   Borderline High Risk:  -159 MG/DL



   High Risk:  -189 MG/DL



   Very High Risk:  LDL Greater than 189 MG/DL







Procedures







 Date  Code  Description  Status

 

 2019  16961  EKG Tracing & Interpretation  Completed

 

 2019  74985  ECHO Transthoracic, Real-Time 2D With Doppler And  Completed



     Color Flow  

 

 2019  68202  ECHO Transthoracic, Real-Time 2D With Doppler And  Completed



     Color Flow  

 

 2019  49363  Inj/Aspir Major JT Or Bursa W/ US  Completed

 

 2018  [id number]  Diabetic Retinal Eye Exam  Completed

 

 04/10/2018  49489  ECHO Transthoracic, Real-Time 2D With Doppler And  Completed



     Color Flow  

 

 04/10/2018  19695  ECHO Transthoracic, Real-Time 2D With Doppler And  Completed



     Color Flow  

 

 2017  53001  Destruction Of Benign Lesions Any Method 1-14 lesions  
Completed

 

 2017  04721  Destruction Of Benign Lesions Any Method 1-14 lesions  
Completed

 

 2017  [id number]  Bone Mineral Density Test  Completed

 

 2017  [id number]  Diabetic Retinal Eye Exam  Completed

 

 2016  48471  Stress Test  Completed

 

 2016  00701  Myocardial Perfusion Imaging Tomographic (Spect)  Completed



     Multiple Studies  

 

 2016  33138  Myocardial Perfusion Imaging Tomographic (Spect)  Completed



     Multiple Studies  

 

 2015  14845  EKG Tracing & Interpretation  Completed

 

 2015  [id number]  Mammogram  Completed

 

 2015  [id number]  Bone Mineral Density Test  Completed

 

 2014  74949  ECHO Transthoracic, Real-Time 2D With Doppler And  Completed



     Color Flow  

 

 10/24/2013  [id number]  Mammogram  Completed

 

 10/23/2012  [id number]  Bone Mineral Density Test  Completed

 

 10/23/2012  [id number]  Mammogram  Completed

 

 10/18/2011  [id number]  Mammogram  Completed

 

 10/04/2011  55296  Treadmill Interp/Report Only  Completed

 

 10/04/2011  32562  Stress Test Supervsn W/Out I/R  Completed

 

 2011  95756  EKG Tracing & Interpretation  Completed

 

 2011  57963  Noninvasive Ear Or Pulse Oximetry For Oxygen  Completed



     Saturation  

 

 2011  21532  ECHO Transthoracic, Real-Time 2D With Doppler And  Completed



     Color Flow  

 

 2010  [id number]  Mammogram  Completed

 

 2009  629946858  Bone Mineral Density Test  Completed

 

 2009  52239  EKG Tracing & Interpretation  Completed

 

 2008  48529  EKG Tracing & Interpretation  Completed

 

 2008  26078  EKG Tracing & Interpretation  Completed

 

 2001  [id number]  Colonoscopy  Completed







Encounters







 Type  Date  Location  Provider  Dx  Diagnosis

 

 Office Visit  2019  [facility]  Amador CASTILLO  Z01.810  Encounter for



   3:00p  Of James Diaz DO FACC    preprocedural



           cardiovascular



           examination









 I35.0  Nonrheumatic aortic (valve) stenosis

 

 I10  Essential (primary) hypertension

 

 I63.9  Cerebral infarction, unspecified

 

 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









 Office Visit  2019 10:30a  [facility]  Radha Collado  M25.552  
Pain in left



     Of C.M.A.  M.D.    hip









 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









 Office Visit  2019  2:40p  [facility]  [place]  I10  Essential (
primary)



     Medicine -  LAURA Carlin    hypertension



     [place]      









 M25.552  Pain in left hip

 

 I35.0  Nonrheumatic aortic (valve) stenosis









 Office Visit  2019  1:30p  [facility]  Radha Collado  M25.552  
Pain in left



     Of C.M.A.  M.D.    hip









 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









 Office Visit  2018  9:00a  [facility]  Tracycaleb Carlin  M25.552  
Pain in left



     Medicine  M.DNavya    hip









 I10  Essential (primary) hypertension









 Office Visit  2018  3:00p  [facility]  [place]  Z00.00  Encntr for



     Janet Carlin M.D.    general adult



           medical exam



           w/o abnormal



           findings









 M54.9  Dorsalgia, unspecified









 Office Visit  07/10/2018 10:20a  [facility]  Mike Vicente MD  L72.0  
Epidermal cyst









 L82.1  Other seborrheic keratosis

 

 Z08  Encntr for follow-up exam after trtmt for malignant neoplasm

 

 Z85.828  Personal history of other malignant neoplasm of skin









 Office Visit  2018  9:40a  [facility]  [place]  M54.9  Dorsalgia,



     Medicine  LAURA Carlin    unspecified









 M25.552  Pain in left hip

 

 I10  Essential (primary) hypertension

 

 E78.5  Hyperlipidemia, unspecified









 Office Visit  2018 10:20a  [facility]  [place]  I10  Essential (
primary)



     Medicine  LAURA Carlin    hypertension









 J06.9  Acute upper respiratory infection, unspecified

 

 I34.0  Nonrheumatic mitral (valve) insufficiency









 Office Visit  2017 10:30a  [facility]  Mike Vicente,  L82.1  Other 
seborrheic



       MD    keratosis









 I78.8  Other diseases of capillaries

 

 R60.0  Localized edema

 

 B07.0  Plantar wart

 

 Z78.9  Other specified health status

 

 R20.8  Other disturbances of skin sensation

 

 S90.921A  Unspecified superficial injury of right foot, init encntr









 Office Visit  2017 10:00a  [facility]  [place]  I10  Essential (
primary)



     Medicine  LAURA Carlin    hypertension









 K62.5  Hemorrhage of anus and rectum

 

 R35.0  Frequency of micturition

 

 K64.4  Residual hemorrhoidal skin tags









 Office Visit  2016 10:00a  [facility]  [place]  I10  Essential (
primary)



     Medicine  LAURA Carlin    hypertension









 D23.9  Other benign neoplasm of skin, unspecified

 

 G47.00  Insomnia, unspecified









 Office Visit  10/31/2016 10:40a  [facility]  Donavon Araiza NP  J01.00  Acute 
maxillary



     Medicine      sinusitis,



           unspecified

 

 Office Visit  2016  4:20p  [facility]  Lisseth Hardy  J01.00  Acute 
maxillary



     Medicine  N.P.    sinusitis,



           unspecified

 

 Office Visit  2016  4:20p  [facility]  Lisseth Hardy  J01.00  Acute 
maxillary



     Medicine  N.P.    sinusitis,



           unspecified

 

 Office Visit  2016  2:00p  [facility]  Radha Collado,  M16.11  Unilateral



     Services Of  M.D.    primary



     C.M.A.      osteoarthritis,



           right hip









 M17.0  Bilateral primary osteoarthritis of knee

 

 M25.462  Effusion, left knee

 

 M25.562  Pain in left knee

 

 M25.461  Effusion, right knee

 

 M25.561  Pain in right knee

 

 M54.5  Low back pain









 Office Visit  2016  2:20p  [facility]  Tracycaleb Carlin,  M25.562  
Pain in left



     Medicine  M.DNavya    knee









 M25.561  Pain in right knee

 

 M25.551  Pain in right hip









 Office Visit  2015  9:40a  [facility]  [place]  M85.9  Disorder of 
bone



     Medicine  LAURA Carlin    density and



           structure,



           unspecified









 R06.02  Shortness of breath

 

 K21.9  Gastro-esophageal reflux disease without esophagitis

 

 E78.5  Hyperlipidemia, unspecified









 Office Visit  2015 10:20a  [facility]  Donavon Jose G,  R06.02  Shortness 
of



     Medicine  NP    breath









 R07.9  Chest pain, unspecified

 

 R14.2  Eructation









 Office Visit  2015 10:00a  [facility]  Donavon Araiza NP  784.91  
Postnasal Drip



     Medicine      









 466.0  Bronchitis Acute

 

 786.2  Cough









 Office  2015  [facility]  Donavon Araiza NP  466.0  Bronchitis Acute



 Visit  9:00a  Medicine      

 

 Office  2015  [facility]  Lisseth Hardy,  788.43  Nocturia



 Visit  1:00p  Medicine  N.P.    

 

 Office  2014  [facility]  [place]  272.0  Hypercholesterolemia Pure



 Visit  10:00a  Janet Carlin M.D.    









 719.44  Pain Joint Hand

 

 424.0  Mitral Valve Disorder









 Office Visit  2014  1:00p  [facility]  [place]  V70.0  Examination



     Janet Carlin M.D.    General Medical



           Routine AT Health



           Care Facility









 401.1  Hypertension Benign

 

 272.0  Hypercholesterolemia Pure

 

 437.8  Cerebrovascular Disease Other

 

 424.1  Aortic Valve Disorder









 Office Visit  2014 11:40a  [facility]  Lisseth Hardy,  724.5  Backache 
Unspec



     Medicine  N.P.    

 

 Office Visit  10/17/2013  1:00p  [facility]  [place]  401.1  Hypertension



     Janet Carlin M.D.    Benign









 780.52  Insomnia Unspecified

 

 V04.81  Need For Prophylactic Vaccination & Inoculation/Influenza









 Office Visit  2013  4:20p  [facility]  Yoanna Cohen,  789.04  Pain 
Abdominal



     Medicine  M.D., FACP    Left Lower



           [place]

 

 Office Visit  2013  2:40p  [facility]  [place]  V70.0  Examination



     Janet Carlin M.D.    General Medical



           Routine AT Health



           Care Facility









 272.0  Hypercholesterolemia Pure

 

 401.1  Hypertension Benign

 

 V76.10  Screening For Malignant Neoplasm Breast









 Office Visit  2012  9:30a  [facility]  Nurse Visit A  401.1  
Hypertension



     Medicine      Benign

 

 Office Visit  10/16/2012 10:00a  [facility]  [place]  401.1  Hypertension



     Medicine  LAURA Carlin    Benign









 782.0  Skin Sensation Disturbance

 

 272.0  Hypercholesterolemia Pure









 Office Visit  2012 10:20a  [facility]  [place]  V70.0  Examination



     Medicine  LAURA Carlin    General Medical



           Routine AT Health



           Care Facility









 272.0  Hypercholesterolemia Pure

 

 724.2  Lumbago

 

 V76.10  Screening For Malignant Neoplasm Breast

 

 V82.81  Special Screening For Osteoporosis

 

 V12.50  History Personal Circulatory Diseases Unspec









 Office Visit  2012 11:20a  [facility]  [place]  729.5  Pain In Limb



     Janet Carlin M.D.    

 

 Office Visit  10/04/2011 10:30a  [facility]  Steve GRACE  786.50  Pain 
Chest



       LAURA Alonso    Unspec









 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

 

 401.1  Hypertension Benign









 Office Visit  2011  DO Not Use  [place]  786.05  Shortness Of



   1:20p  Deinse Carlin M.D.    Breath









 v04.81  Need For Prophylactic Vaccination & Inoculation/Influenza









 Office Visit  2011  DO Not Use  [place]  401.1  Hypertension



   10:20a  Denise Carlin M.D.    Benign









 272.0  Hypercholesterolemia Pure

 

 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

 

 V76.10  Screening For Malignant Neoplasm Breast









 Office Visit  2011  DO Not Use  Lisseth Hardy,  680.8  [place] &



   11:00a  Denise  N.P.    Furuncle Spec



           Sites Other

 

 Office Visit  2011  DO Not Use  [place]  401.1  Hypertension



   10:30a  Denise Carlin M.D.    Benign

 

 Office Visit  2011  DO Not Use  [place]  789.00  Pain Abdominal



   11:30a  Denise Carlin M.D.    Unspec Site

 

 Office Visit  2010  DO Not Use  [place]  V70.0  Examination



   10:45a  Denise Carlin M.D.    General Medical



           Routine AT Health



           Care Facility









 401.1  Hypertension Benign

 

 272.0  Hypercholesterolemia Pure

 

 569.49  Rectum & Anus Disorder Other









 Office Visit  2010  DO Not Use  [place],  465.9  URI  Upper



   2:45p  Denise Appiah M.D.    Respiratory



           Infections Acute



           Unspec Sites









 786.2  Cough

 

 401.1  Hypertension Benign









 Office Visit  2009  DO Not Use  Lisseth Antony,  466.0  Bronchitis Acute



   2:45p  James-Pierce  N.P.    

 

 Office Visit  2009  DO Not Use  [place],  V70.0  Examination



   2:30p  Denise Appiah M.D.    General Medical



           Routine AT Health



           Care Facility









 729.5  Pain In Limb

 

 401.1  Hypertension Benign

 

 272.0  Hypercholesterolemia Pure

 

 443.9  Peripheral Vascular Disease Unspec

 

 V04.81  Need For Prophylactic Vaccination & Inoculation/Influenza









 Office Visit  2009  DO Not Use  Lisseth Hardy,  727.43  Ganglion Unspec



   4:15p  James-Pierce  N.P.    

 

 Office Visit  2009  DO Not Use  [place],  443.9  Peripheral



   2:45p  Denise Appiah M.D.    Vascular Disease



           Unspec









 715.94  Osteoarthrosis Unspec Genlzd Or Localized Hand

 

 401.1  Hypertension Benign









 Office Visit  2009  DO Not Use  [place],  V72.84  Examination



   3:15p  Denise Appiah M.D.    Preoperative



           Unspec









 443.9  Peripheral Vascular Disease Unspec

 

 401.1  Hypertension Benign









 Office  2008  DO Not Use  [place],  272.0  Hypercholesterolemia



 Visit  2:45p  Denise Appiah M.D.    Pure









 443.9  Peripheral Vascular Disease Unspec

 

 311  Depressive Disorder Not Elsewhere Spec









 Office Visit  2008  DO Not Use  [place],  443.9  Peripheral



   10:45a  Denise Appiah M.D.    Vascular Disease



           Unspec









 780.79  Malaise And Fatigue Other

 

 401.1  Hypertension Benign

 

 311  Depressive Disorder Not Elsewhere Spec

 

 V70.0  Examination General Medical Routine AT Health Care Facility









 Office Visit  2008  DO Not Use  [place],  272.4  Hyperlipidemia



   2:45p  Denise Appiah M.D.    Other Unspec









 401.1  Hypertension Benign









 Office  2008  DO Not Use  [place],  272.0  Hypercholesterolemia



 Visit  2:30p  Denise Appiah M.D.    Pure









 443.9  Peripheral Vascular Disease Unspec

 

 401.1  Hypertension Benign









 Office Visit  2007  DO Not Use  [place],  401.1  Hypertension



   2:45p  Denise Appiah M.D.    Benign









 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

 

 272.0  Hypercholesterolemia Pure

 

 V70.0  Examination General Medical Routine AT Health Care Facility









 Office Visit  2007  DO Not Use  [place],  401.1  Hypertension



   2:45p  Denise Appiah M.D.    Benign









 528.9  Oral Soft Tissue Diseases Other & Unspec

 

 272.0  Hypercholesterolemia Pure









 Office Visit  2006  DO Not Use  [place],  V72.31  Routine Gyn



   2:15p  Denise Appiah M.D.    Examination







Plan of Treatment

Future Appointment(s):2019  1:30 pm - Anthony Garcia PA-C at [facility].2019  1:30 pm - GLORIA Moreau at [facility].2019 10:00 am - Radha Collado M.D. at [facility].2019  1:30 pm - Radha Collado M.D. [facility]2019  3:00 pm - Tracy Carlin M.D. at [facility] 
Ppxfbnqq85/30/2019 - Tracy Carlin M.D.Z01.818 Encounter for other 
preprocedural examinationComments:Stop the aspirin as per instructions from Dr. Cerda not take ibuprofen, Motrin, Advil, Aleve, naproxen. Tylenol is OKTake 
your diltiazem with a sip of water on the morning of [id number] Essential (
primary) hypertensionComments:Your blood pressure is fine. Continue the same 
[id number] Unilateral primary osteoarthritis, left hipL98.9 Disorder of 
the skin and subcutaneous tissue, unspecifiedReferral:Mike Vicente MD, 
Dermatology

## 2019-06-11 NOTE — OP
Operative Report - Blank





- Operative Report


Date of Operation: 06/11/19


Note: 





MILLY SHEETS





1930





Date Of Surgery: 6/11/19





Radha Collado MD





Assistant: VITO CASTRO did help throughout the procedure with preparation of 

the hip, wound retraction, manipulation of the hip, and wound closure.  





Anesthesiologist: MELI Blanca MD 





Anesthesia Type: Spinal 





Preoperative Diagnosis: Left severe degenerative osteoarthritis of the hip





Postoperative Diagnosis: As above





Procedure Performed: Left Total Hip Arthroplasty





Complications: None





Specimen: Femoral head and acetabular reamings sent to pathology. 


   


Hardware used: This is uncemented Laith total hip arthroplasty hardware  for 

the femur a size 4 accolade II with 127 neck femoral component, for the 

acetabulum a size 50 D trident II tritanium  cluster hole shell, a single 20 mm 

screw, for the insert a size 36 D polyethylene trident X 3  insert, and for the 

femoral head a size 36 - 2.5 ceramic biolox delta  V40 femoral head.  





Brief history/Indication:  MILLY SHEETS was known in clinic and had a 

history of severe left hip pain. She failed conservative treatment with anti-

inflammatories, pain pills, intra-articular injections and physical therapy. 

She elected to undergo left total hip arthroplasty due to continued pain and 

decreased quality of life. Radiographs showed severe end stage osteoarthritis 

of the hip with bone on bone contact.  Informed consent was obtained from the 

patient. She understood the risks of surgery included but were not limited to: 

bleeding, infection, damage to nearby structures, intraoperative fracture, 

nerve palsy, failure of the hardware, early loosening, stiffness or loss of 

motion, dislocation, leg length discrepancy, anesthesia complications, stroke, 

heart attack, blood clot and death. She wished to proceed.  





Intra-Operative findings: Intraoperatively the patient was noted to have severe 

loss of cartilage of the acetabulum and femoral head.  





Description of the Procedure: 





MILLY SHEETS was identified in the preanesthesia unit. Her left hip was 

marked as the correct operative side. Informed consent was signed and placed in 

the chart. The patient was taken to the operating room and placed under 

anesthesia without complication. A wu catheter was placed. The patient was 

placed on the peg board with all bony prominences well padded. The left lower 

extremity was prepped and draped in the usual sterile fashion. Preoperative time

-out was made to correctly identify the patient, side and site. Appropriate 

intraoperative antibiotics were given within one hour of incision.  





A standard posterior incision was made and carried sharply down to the lateral 

fascia. A new 10 blade was used to make an incision in the fascia in line with 

the skin incision. A charnley retractor was placed.  The piriformis and 

conjoined tendons were identified and elevated off the posterolateral femur 

using electrocautery. These were tagged with number 5 Ethibond. Next 

electrocautery was used to make a posterolateral capsular flap and this was 

tagged with number 5 Ethibonds. The hip was carefully dislocated. 


   


Lesser trochanter to the center of the femoral head was measured at 55 mm. The 

oscillating saw was used to make the femoral neck cut. The femoral head was 

carefully removed. The femur was retracted anteriorly and the acetabular 

retractors were placed. Long-handled knife was used to sharply remove any 

remaining labrum from the acetabular rim. The acetabulum was sequentially 

reamed up to a size 49.  A bleeding subchondral bone bed was obtained. A trial 

liner was placed and had excellent fit and stability. A 50D trident II 

tritanium with a 20 mm screw was placed and had excellent stability with 

appropriate anteversion and abduction angle. A size 36 D polyethylene liner was 

impacted into the acetabular shell. The liner was checked for stability and was 

stable.  





Next attention was turned to preparation of the femoral canal. A canal finder 

was used to enter the proximal femur. The femoral canal was sequentially 

broached up to a size 4 femoral broach trial. A trial neck and 36 - 2.5 trial 

femoral head was chosen. Lesser trochanter to center of the femoral head 

measurement was satisfactory. The hip was reduced and taken through a range of 

motion.  The hip was stable in all positions with good soft tissue tension and 

appropriate leg lengths. The hip was dislocated and all trials were removed.  





The final implant chosen was a size 4 accolade II with 127 degree neck angle. 

This stem was impacted into the femoral canal without difficulty. The stem was 

stable with appropriate anteversion. The femoral head chosen was a 36 - 2.5 

ceramic head.   The head was impacted onto the femoral neck without difficulty. 

The final lesser trochanter to center of the femoral head measurement was 

satisfactory. The hip was reduced and taken through a range of motion. The hip 

was stable in all positions with good soft tissue tension and appropriate leg 

lengths. The hip was copiously irrigated with sterile saline.  





The previously tagged capsule and tendons were repaired to the posterolateral 

femur through two trochanteric drill holes. The lateral fascia layer was closed 

using number 1 vicryls. The rest of the incision was closed in a layered 

fashion using 0 and 2-0 vicryls. The skin was closed using 3-0 monocryl suture 

and Dermabond.  Sterile adaptic, 4x4s and paper tape was used to cover the 

incision. The patients anesthesia was reversed without difficulty. She was 

taken to the PACU in stable condition. Intended weight-bearing will be as 

tolerated with posterior hip precautions.

## 2019-06-11 NOTE — XMS REPORT
Continuity of Care Document (CCD)

 Created on:May 20, 2019



Patient:Shiela Sheets

Sex:Female

:1930

External Reference #:[id number]





Demographics







 Address  [address] [place] [address]

 

 Home Phone  [phone]

 

 Email Address  [email or web address]

 

 Preferred Language  en

 

 Marital Status  Not  or 

 

 Voodoo Affiliation  Unknown

 

 Race  White

 

 Ethnic Group  Not  or 









Author







 Name  Jessica Saleem









Support







 Name  Relationship  Address  Phone

 

 Frank Sheets  Unavailable  Unavailable  [phone]

 

 Love Sheets  Unavailable  Unavailable  [phone]









Care Team Providers







 Name  Role  Phone

 

 Tracy Carlin MD  Primary Care Physician  Unavailable









Payers







 Date  Identification Numbers  Payment Provider  Subscriber

 

 Effective: 2013  Policy Number: [id number]  Medicare Blue Ppo  
Shiela Sheets









 Group Number: [id number]  [address] [address]

 

 PayID:   [id number]









 Expires: 2012  Policy Number: [id number]  Medicare Blue Ppo  Shiela Sheets









 Group Number: MEDICARE BLUE PPO  [address] [address]

 

 Group [place]: [place] [address]









 PayID: 







Problems







 Active Problems  Provider  Date

 

 Benign essential hypertension  Tracy Carlin M.D.  Onset: 2010

 

 Cerebral artery occlusion  Tracy Carlin M.D.  Onset: 2010

 

 Localized, primary osteoarthritis  Radha Collado M.D.  Onset: 2016

 

 Localized, primary osteoarthritis of the  Radha Collado M.D.  Onset: 2016



 pelvic region and thigh    







Family History







 Date  Family Member(s)  Observation  Comments

 

   General  Heart Disease  

 

   General  Diabetes  

 

   General  Cancer  

 

   General  Alzheimer's Disease  

 

 :  (age 85  Father   due to Cancer,  



 Years)    Lung  

 

 :  (age 93  Mother   due to  



 Years)    Alzheimer's Disease  

 

   First Brother   due to Heart  ()



     Disease  

 

   Second Brother   due to Melanoma  () - CAD

 

   Third Brother   due to  () -  of



     Alzheimer's Disease  pneumonia

 

   First Sister  Breast Cancer  

 

 :  (age 85  Second Sister   due to COPD  



 Years)      

 

   Third Sister  Alive And Well  







Social History







 Type  Date  Description  Comments

 

 Birth Sex    Unknown  

 

 Marital Status    Single  First  killed



       in , remarried



       but  in 

 

 Lives With    Alone  Drives without



       problems. Worked at



       [employer] quit in



       . Volunteers at



       the [employer]

 

 Occupation    Book keeper  

 

 Advance Directive    Health Care Proxy  son: Frank Sheets

 

 Tobacco Use  Start: Unknown  Never Smoked Cigarettes  

 

 ETOH Use    Denies alcohol use  

 

 Tobacco Use  Start: Unknown  Patient has never  



     smoked  

 

 Recreational Drug Use    Denies Drug Use  

 

 Smoking Status  Reviewed: 19  Patient has never  



     smoked  

 

 Exercise Type/Frequency    Exercises sporadically  







Allergies, Adverse Reactions, Alerts







 Active Allergies  Reaction  Severity  Comments  Date

 

 Penicillin  HIVES  Severe    2010

 

 Sulfa  CHEST PAIN  Severe    2010

 

 Keflex  RASH  Severe    2010

 

 Lisinopril  cough      10/16/2012

 

 Metaxalone  dizzy      2014

 

 Latex/Tape        2019







Medications







 Active Medications  SIG  Qnty  Indications  Ordering Provider  Date

 

 Vit B12  3 x wk      Tracy Carlin,  2013



         M.DNavya  

 

 Diltiazem HCL ER  take one capsule  90caps  R06.02  Tracy Carlin,  10/16/
2012



 Coated Beads  by mouth once      M.D.  



             120mg  daily        



 Caps ER 24HR          



           

 

 Crestor  Take One Tablet  90tabs    Tracy Carlin,  2012



        40mg Tablets  By Mouth Once      M.D.  



   Daily        

 

 Aspirin  1 by mouth once      Tracy Carlin,  2010



        81mg Tablets  daily      M.D.  



 DR          

 

 Caltrate 600+D  1 by mouth once      Tracy Carlin,  2010



   daily      M.D.  



 [id number]          



 Tablets          



           

 

 Proctosol HC  Use as directed  28.350gm    Tracy Carlin,  2010



             2.5%        M.DNavya  



 Cream          



           

 

 Eq Fiber Therapy  2 in in the      Unknown  



   morning        



 500mg Tablets          



           

 

 Tylenol 8 Hour  1 tab every 6      Unknown  



               650mg  hours as needed        



 Tablets ER  for pain        



           

 

 Eylea  prn      Unknown  



      2mg/0.05ML          



 Solution          



           

 

 Eye Vitamins  daily      Unknown  



           



 Capsules          



           

 

 One Daily For Women  1 po qd      Unknown  



           



 Tablets          



           

 

 Travatan  each eye nightly      Unknown  



         0.004%          



 Solution          



           









 History Medications









 Tramadol HCL  1-2 tablets every  42tabs  M54.9  [facility]  2018 -



             50mg  6 hours as needed      LAURA Carlin  2019



 Tablets          



           

 

 Doxycycline Hyclate  one tablet twice  20caps  J01.00  Donavon Araiza NP  10/31/
2016 -



   daily for 10        2016



 100mg Capsules  days.        



           

 

 Fluticasone  2 sprays each  16units  J01.00  Donavon Araiza NP  10/31/2016 -



 Propionate  nostril daily as        2019



           50mcg/Act  needed-not using        



 Suspension          



           

 

 Benzonatate  take one or two  30caps  J01.00  Donavon Araiza NP  10/31/2016 -



            100mg  capsules every 8        2016



 Capsules  hours as needed        



   for cough.        

 

 Levaquin  1 by mouth daily  10tabs  J01.00  Lisseth Hardy,  2016 -



         500mg  for 10 days      N.P.  2016



 Tablets          



           

 

 Fluticasone  2 sprays each  16units  J01.00  Lisseth Hardy,  2016 -



 Propionate  nostril daily as      N.P.  2016



           50mcg/Act  needed        



 Suspension          



           

 

 Azithromycin  two tabs day one,  6tabs  J01.00  Lisseth Hardy,  2016 -



             250mg  one daily till      N.P.  2016



 Tablets  gone        



           

 

 Meloxicam  1 by mouth every  60tabs  M16.11  Radha Collado,  2016 -



          15mg  day      M.DNavya  2017



 Tablets          



           

 

 Lorazepam  1-2 tabs by mouth  5tabs    Tracy  2016 -



          0.5mg  prior to      LAURA Carlin  04/10/2016



 Tablets  procedure        



           

 

 Omeprazole  1 by mouth as  30caps  R14.2  Donavon Araiza NP  2015 -



           20mg  needed        2016



 Capsules DR Andrew  1 by mouth every  30tabs    Lisseth Hardy,  2015 -



         10mg Tablets  day      N.P.  2015



           

 

 Ditropan XL  1 by mouth every  30tabs  788.43  Lisseth Hardy,  2015 -



            10mg  day      N.P.  2015



 Tablets ER 24HR          



           

 

 Metaxalone  take 1 tablet 3  30tabs  724.5  Lisseth Hardy,  2014 -



           800mg  times a day as      N.P.  2014



 Tablets  needed        



           

 

 Tizanidine HCL  One po q 4 hours  30caps    Lisseth Antony,  2014 -



               4mg  prn      N.P.  2014



 Capsules          



           

 

 Zolpidem Tartrate  1/2-1 po tablet  30tabs  780.52  Tracy  10/17/2013 -



                  5mg  at bedtime as      LAURA Carlin  2014



 Tablets  needed        



           

 

 Zostavax  1 dose s/c  1units    [facility]  2012 -



          Solution        LAURA Carlin  10/15/2012



 Rec          

 

 Diltiazem HCL ER  Take One Capsule  30caps  786.05  [facility]  2012 -



   By Mouth Once      LAURA Carlin  10/16/2012



 180mg Caps ER 24HR  Daily        



           

 

 Atorvastatin Calcium  1 po qd  90tabs    [facility]  2012 -



         LAURA Carlin  2012



 40mg Tablets          



           

 

 Diltiazem CD  1 PO qd  30caps  786.05  [facility]  2011 -



             180mg        LAURA Carlin  2012



 Caps ER 24HR          



           

 

 Lisinopril  1 tablet once  30tabs  786.05  Tracy   -



           10mg  daily      LAURA Carlin  2011



 Tablets          



           

 

 Cipro  1 tablet twice a  20tabs    Unknown   -



      500mg Tablets  day        2011



           

 

 Crestor  Take One Tablet  30tabs    [facility]   -



        40mg Tablets  By Mouth AT      LAURA Carlin  2012



   Bedtime        

 

 Aleve  1 po prn  60caps    Unknown   -



      220mg Capsules          10/16/2012



           

 

 Flonase Allergy  2 sprays each      Unknown   -



 Relief  nostril once        2015



       50mcg/Act  daily        



 Suspension          



           







Medications Administered in Office







 Medication  SIG  Qnty  Indications  Ordering Provider  Date

 

 Depomedrol 40MG        Radha Collado M.D.  2019



         Injection          



           

 

 Inj, Regadenoson, 0.1 MG        Amador Diaz DO [facility]  2016



                  Injection          



           

 

 Inj, Regadenoson, 0.1 MG        GLORIA Peralta  2016



                  Injection          



           

 

 Technetium TC 99M        Amador Diaz DO [facility]  2016



 Tetrofosmin, Per Unit Dose          



 Up To 40 Millicuries          



              Injection          



           

 

 Technetium TC 99M        GLORIA Peralta  2016



 Tetrofosmin, Per Unit Dose          



 Up To 40 Millicuries          



              Injection          



           







Immunizations







 CPT Code  Status  Date  Vaccine  Lot #

 

 [id number]  Given  10/02/2018  Influenza Virus Vaccine, Quadrivalent, Split,  



       Preservative Free  

 

 [id number]  Given  2017  Influenza Virus Vaccine, Quadrivalent, Split,  [id number]



       Preservative Free  

 

 80047  Given  10/14/2016  Influ Virus Vaccine, Quadrivalent, Split Virus,  
[id number]



       Im Fluzone not PF  

 

 63927  Given  10/10/2015  Influenza Virus Vaccine, Quadrivalent, Split,  [id number]



       Preservative Free  

 

 16886  Given  2015  Pneumococcal Conjugate Vaccine 13 Valent For  [id number]



       Intramuscular Use  

 

 33114  Given  10/14/2014  Influenza Virus Vaccine, Quadrivalent, Split,  [id number]



       Preservative Free  

 

 23231  Given  10/17/2013  Flu Vaccine Split Virus Preservative Free For  
[id number]



       Indiv 3Yr Older  

 

   Given  2012  Fluzone Vaccine  

 

 67272  Given  2012  Zoster (Zostavax)  

 

   Given  2011  Afluria Vaccine  [id number]

 

 14813  Given  2010  Influenza Virus 3Yrs & Over  [id number]

 

 87214  Given  2009  Influenza Virus 3Yrs & Over  

 

 36430  Given  10/22/2008  Influenza Virus 3Yrs & Over  

 

 29100  Given  10/22/2008  Influenza Virus 3Yrs & Over  

 

 41857  Given  10/25/2007  Influenza Virus 3Yrs & Over  







Vital Signs







 Date  Vital  Result  Comment

 

 2019  2:55pm  Height  63 inches  5'3"









 Weight  144.00 lb  with shoes

 

 Heart Rate  70 /min  

 

 BP Systolic  140 mmHg  Rue reg cuff

 

 BP Diastolic  60 mmHg  Rue reg cuff

 

 BP Systolic Sitting  134 mmHg  Lue reg cuff

 

 BP Diastolic Sitting  62 mmHg  Lue reg cuff

 

 BP Systolic Standing  132 mmHg  Lue reg cuff

 

 BP Diastolic Standing  60 mmHg  Lue reg cuff

 

 Respiratory Rate  15 /min  

 

 BMI (Body Mass Index)  25.5 kg/m2  

 

 Ejection Fraction  65-70%  date 19 ECHO









 2019 11:14am  Height  63 inches  5'3"









 Heart Rate  66 /min  

 

 BP Systolic Sitting  150 mmHg  

 

 BP Diastolic Sitting  66 mmHg  

 

 Pain Level  5  

 

 O2 % BldC Oximetry  95 %  









 2019  2:47pm  Height  63 inches  5'3"









 Weight  145.00 lb  

 

 BP Systolic  132 mmHg  

 

 BP Diastolic  60 mmHg  

 

 Body Temperature  98.2 F  

 

 Pain Level  6  

 

 BMI (Body Mass Index)  25.7 kg/m2  









 2019  2:38pm  Height  62.5 inches  5'2.50"









 Weight  142.38 lb  

 

 Heart Rate  76 /min  

 

 BP Systolic  132 mmHg  

 

 BP Diastolic  65 mmHg  

 

 Body Temperature  97.9 F  

 

 O2 % BldC Oximetry  97 %  

 

 BMI (Body Mass Index)  25.6 kg/m2  









 2019  1:40pm  Height  62.5 inches  5'2.50"









 Weight  145.00 lb  

 

 Heart Rate  76 /min  

 

 BP Systolic  130 mmHg  

 

 BP Diastolic  76 mmHg  

 

 BMI (Body Mass Index)  26.1 kg/m2  









 2018  8:52am  Height  63 inches  5'3"









 Weight  143.00 lb  

 

 Heart Rate  80 /min  

 

 BP Systolic  124 mmHg  

 

 BP Diastolic  70 mmHg  

 

 BP Systolic Sitting  147 mmHg  

 

 BP Diastolic Sitting  70 mmHg  

 

 O2 % BldC Oximetry  98 %  

 

 BMI (Body Mass Index)  25.3 kg/m2  









 2018  3:36pm  Height  63 inches  5'3"









 Weight  143.00 lb  

 

 Heart Rate  67 /min  

 

 BP Systolic Sitting  135 mmHg  

 

 BP Diastolic Sitting  67 mmHg  

 

 O2 % BldC Oximetry  96 %  

 

 BMI (Body Mass Index)  25.3 kg/m2  









 2018  9:42am  Height  63 inches  5'3"









 Weight  145.00 lb  

 

 Heart Rate  73 /min  

 

 BP Systolic Sitting  128 mmHg  

 

 BP Diastolic Sitting  65 mmHg  

 

 Body Temperature  98.1 F  

 

 O2 % BldC Oximetry  97 %  

 

 BMI (Body Mass Index)  25.7 kg/m2  









 2018 10:33am  Weight  146.25 lb  









 Heart Rate  81 /min  

 

 BP Systolic Sitting  110 mmHg  

 

 BP Diastolic Sitting  68 mmHg  

 

 Body Temperature  99.7 F  

 

 O2 % BldC Oximetry  95 %  









 2017 10:49am  Height  63 inches  5'3"









 Weight  146.50 lb  

 

 Heart Rate  68 /min  

 

 BP Systolic  140 mmHg  

 

 BP Diastolic  80 mmHg  

 

 Body Temperature  98.1 F  

 

 O2 % BldC Oximetry  97 %  

 

 BMI (Body Mass Index)  25.9 kg/m2  









 2017  9:44am  Weight  145.00 lb  









 Heart Rate  80 /min  

 

 BP Systolic Sitting  140 mmHg  

 

 BP Diastolic Sitting  70 mmHg  

 

 Respiratory Rate  16 /min  

 

 O2 % BldC Oximetry  98 %  









 2016  9:55am  Weight  146.00 lb  









 Heart Rate  70 /min  

 

 BP Systolic  122 mmHg  

 

 BP Diastolic  74 mmHg  

 

 Respiratory Rate  15 /min  

 

 Body Temperature  98.0 F  

 

 O2 % BldC Oximetry  98 %  









 10/31/2016 10:28am  Heart Rate  84 /min  









 BP Systolic Sitting  138 mmHg  

 

 BP Diastolic Sitting  80 mmHg  

 

 Respiratory Rate  14 /min  

 

 Body Temperature  98.1 F  

 

 O2 % BldC Oximetry  98 %  









 2016  4:23pm  Weight  143.00 lb  with shoes









 Heart Rate  68 /min  

 

 BP Systolic Sitting  140 mmHg  

 

 BP Diastolic Sitting  70 mmHg  

 

 Body Temperature  98.2 F  

 

 O2 % BldC Oximetry  98 %  









 2016  2:43pm  Height  62 inches  5'2"









 Weight  146.00 lb  

 

 Heart Rate  76 /min  

 

 BP Systolic Sitting  120 mmHg  

 

 BP Diastolic Sitting  72 mmHg  

 

 Respiratory Rate  15 /min  

 

 Body Temperature  97.8 F  

 

 O2 % BldC Oximetry  97 %  

 

 BMI (Body Mass Index)  26.7 kg/m2  









 2016  4:21pm  Heart Rate  77 /min  









 BP Systolic Sitting  140 mmHg  

 

 BP Diastolic Sitting  70 mmHg  

 

 Body Temperature  98.8 F  

 

 O2 % BldC Oximetry  97 %  









 2016  2:01pm  Height  63 inches  5'3"









 Weight  145.00 lb  

 

 Heart Rate  72 /min  

 

 BP Systolic  137 mmHg  

 

 BP Diastolic  69 mmHg  

 

 BMI (Body Mass Index)  25.7 kg/m2  









 2016  2:25pm  Heart Rate  70 /min  









 BP Systolic Sitting  133 mmHg  

 

 BP Diastolic Sitting  73 mmHg  

 

 Body Temperature  98.4 F  

 

 Pain Level  4  

 

 O2 % BldC Oximetry  97 %  









 2015  9:31am  Height  63 inches  5'3"









 Weight  148.00 lb  

 

 Heart Rate  70 /min  

 

 BP Systolic Sitting  128 mmHg  

 

 BP Diastolic Sitting  82 mmHg  

 

 Respiratory Rate  15 /min  

 

 Body Temperature  98.5 F  

 

 O2 % BldC Oximetry  98 %  

 

 BMI (Body Mass Index)  26.2 kg/m2  









 2015 10:39am  Weight  148.00 lb  









 Heart Rate  66 /min  

 

 BP Systolic Sitting  122 mmHg  

 

 BP Diastolic Sitting  68 mmHg  

 

 Respiratory Rate  14 /min  

 

 Body Temperature  98.8 F  

 

 O2 % BldC Oximetry  98 %  









 10/10/2015 10:03am  Body Temperature  97.1 F  

 

 2015  3:47pm  Weight  145.75 lb  









 Heart Rate  72 /min  

 

 BP Systolic Sitting  154 mmHg  

 

 BP Diastolic Sitting  79 mmHg  









 2015 10:00am  Weight  148.00 lb  









 Heart Rate  71 /min  

 

 BP Systolic Sitting  125 mmHg  

 

 BP Diastolic Sitting  70 mmHg  

 

 Body Temperature  97.7 F  









 2015  8:44am  Weight  145.75 lb  









 Heart Rate  77 /min  

 

 BP Systolic Sitting  134 mmHg  

 

 BP Diastolic Sitting  68 mmHg  

 

 Body Temperature  97.7 F  

 

 O2 % BldC Oximetry  98 %  









 2015 12:56pm  Height  63 inches  5'3"









 Weight  150.00 lb  

 

 Heart Rate  74 /min  

 

 BP Systolic  139 mmHg  

 

 BP Diastolic  64 mmHg  

 

 Body Temperature  98.3 F  

 

 BMI (Body Mass Index)  26.6 kg/m2  









 2014 10:00am  Height  63 inches  5'3"









 Weight  148.50 lb  

 

 Heart Rate  83 /min  

 

 BP Systolic Sitting  120 mmHg  

 

 BP Diastolic Sitting  60 mmHg  

 

 Body Temperature  97.3 F  

 

 O2 % BldC Oximetry  96 %  

 

 BMI (Body Mass Index)  26.3 kg/m2  









 2014 12:56pm  Height  63 inches  5'3"









 Weight  148.00 lb  

 

 Heart Rate  76 /min  

 

 BP Systolic Sitting  118 mmHg  

 

 BP Diastolic Sitting  62 mmHg  

 

 Body Temperature  98.3 F  

 

 BMI (Body Mass Index)  26.2 kg/m2  









 2014 11:40am  Weight  147.50 lb  









 Heart Rate  76 /min  

 

 BP Systolic  144 mmHg  

 

 BP Diastolic  76 mmHg  

 

 Respiratory Rate  16 /min  

 

 Body Temperature  98.3 F  









 10/17/2013 12:59pm  Weight  147.00 lb  









 Heart Rate  72 /min  

 

 BP Systolic  122 mmHg  

 

 BP Diastolic  62 mmHg  









 2013  4:16pm  Height  62 inches  5'2"









 Weight  144.50 lb  

 

 Heart Rate  84 /min  

 

 BP Systolic Sitting  110 mmHg  

 

 BP Diastolic Sitting  60 mmHg  

 

 Body Temperature  98.1 F  

 

 BMI (Body Mass Index)  26.4 kg/m2  









 2013  2:28pm  Height  62.5 inches  5'2.50"









 Weight  146.00 lb  

 

 Heart Rate  72 /min  

 

 BP Systolic Sitting  130 mmHg  

 

 BP Diastolic Sitting  60 mmHg  

 

 BMI (Body Mass Index)  26.3 kg/m2  









 2012  9:37am  Height  63.5 inches  5'3.50"









 Weight  146.00 lb  

 

 Heart Rate  78 /min  

 

 BP Systolic Sitting  126 mmHg  

 

 BP Diastolic Sitting  68 mmHg  

 

 BMI (Body Mass Index)  25.5 kg/m2  









 10/16/2012  9:54am  Height  63.5 inches  5'3.50"









 Weight  146.00 lb  

 

 Heart Rate  72 /min  

 

 BP Systolic Sitting  120 mmHg  

 

 BP Diastolic Sitting  62 mmHg  

 

 BMI (Body Mass Index)  25.5 kg/m2  









 2012 10:27am  Height  63.5 inches  5'3.50"









 Weight  144.50 lb  

 

 Heart Rate  60 /min  

 

 BP Systolic Sitting  130 mmHg  

 

 BP Diastolic Sitting  82 mmHg  

 

 BMI (Body Mass Index)  25.2 kg/m2  









 2012 11:27am  Height  63.5 inches  5'3.50"









 Weight  142.00 lb  

 

 Heart Rate  76 /min  

 

 BP Systolic Sitting  126 mmHg  

 

 BP Diastolic Sitting  62 mmHg  

 

 BMI (Body Mass Index)  24.8 kg/m2  









 2011  1:24pm  Height  63.5 inches  5'3.50"









 Weight  140.00 lb  

 

 Heart Rate  68 /min  

 

 BP Systolic Sitting  134 mmHg  

 

 BP Diastolic Sitting  52 mmHg  

 

 O2 % BldC Oximetry  97 %  

 

 BMI (Body Mass Index)  24.4 kg/m2  









 2011 10:03am  Height  63.5 inches  5'3.50"









 Weight  140.00 lb  

 

 Heart Rate  64 /min  

 

 BP Systolic Sitting  110 mmHg  

 

 BP Diastolic Sitting  76 mmHg  

 

 BMI (Body Mass Index)  24.4 kg/m2  









 2011 11:06am  Height  63.5 inches  5'3.50"









 Weight  139.00 lb  

 

 Heart Rate  68 /min  

 

 BP Systolic Sitting  104 mmHg  

 

 BP Diastolic Sitting  78 mmHg  

 

 BMI (Body Mass Index)  24.2 kg/m2  









 2011 10:33am  Weight  140.00 lb  









 Heart Rate  70 /min  

 

 BP Systolic  130 mmHg  

 

 BP Diastolic  60 mmHg  









 2011 11:35am  Weight  139.00 lb  









 Heart Rate  60 /min  

 

 BP Systolic  126 mmHg  

 

 BP Diastolic  60 mmHg  

 

 Body Temperature  98.7 F  

 

 O2 % BldC Oximetry  98 %  









 2010  7:38pm  Height  62.5 inches  5'2.50"









 Weight  142.75 lb  

 

 Heart Rate  68 /min  

 

 BP Systolic  132 mmHg  

 

 BP Diastolic  70 mmHg  

 

 BMI (Body Mass Index)  25.7 kg/m2  







Results







 Test  Date  Facility  Test  Result  H/L  Range  Note

 

 Lipid Profile  2019  [facility]  Triglycerides  132 mg/dL    
  1



 (Trig/Chol/HDL)    [address] [place] [address] [phone]          









 Cholesterol  143 mg/dL      2

 

 HDL Cholesterol  46.8 mg/dL      3

 

 LDL Cholesterol  70 mg/dL      4









 Comp Metabolic Panel  2019  [facility]  Sodium  141 mmol/L  N
  135-145  



     [address] [place] [address] [phone]          









 Potassium  4.1 mmol/L  N  3.5-5.0  

 

 Chloride  105 mmol/L  N  101-111  

 

 Co2 Carbon Dioxide  30 mmol/L  N  22-32  

 

 Anion Gap  6 mmol/L  N  2-11  

 

 Glucose  94 mg/dL  N    

 

 Blood Urea Nitrogen  12 mg/dL  N  6-24  

 

 Creatinine  0.66 mg/dL  N  0.51-0.95  

 

 BUN/Creatinine Ratio  18.2  N  8-20  

 

 Calcium  9.3 mg/dL  N  8.6-10.3  

 

 Total Protein  6.3 g/dL  Low  6.4-8.9  

 

 Albumin  3.9 g/dL  N  3.2-5.2  

 

 Globulin  2.4 g/dL  N  2-4  

 

 Albumin/Globulin Ratio  1.6  N  1-3  

 

 Total Bilirubin  0.50 mg/dL  N  0.2-1.0  

 

 Alkaline Phosphatase  73 U/L  N    

 

 Alt  24 U/L  N  7-52  

 

 Ast  23 U/L  N  13-39  

 

 Egfr Non-  84.5    >60  

 

 Egfr   102.3    >60  5









 Xray  2019  [facility]  Inj/Aspir Major JT  <pending>      



       Or Bursa W/ US        

 

 CBC Auto Diff  2018  [facility]  White Blood Count  8.2 10^3/
uL  N  3.5-10.8  



     [address] [place] [address] [phone]          









 Red Blood Count  4.11 10^6/uL  N  4.0-5.4  

 

 Hemoglobin  12.8 g/dL  N  12.0-16.0  

 

 Hematocrit  39 %  N  35-47  

 

 Mean Corpuscular Volume  95 fL  N  80-97  

 

 Mean Corpuscular Hemoglobin  31 pg  N  27-31  

 

 Mean Corpuscular HGB Conc  33 g/dL  N  31-36  

 

 Red Cell Distribution Width  15 %  N  10.5-15  

 

 Platelet Count  244 10^3/uL  N  150-450  

 

 Mean Platelet Volume  9.2 um3  N  7.4-10.4  

 

 Abs Neutrophils  5.3 10^3/uL  N  1.5-7.7  

 

 Abs Lymphocytes  2.1 10^3/uL  N  1.0-4.8  

 

 Abs Monocytes  0.7 10^3/uL  N  0-0.8  

 

 Abs Eosinophils  0.1 10^3/uL  N  0-0.6  

 

 Abs Basophils  0.1 10^3/uL  N  0-0.2  

 

 Abs Nucleated RBC  0 10^3/uL      

 

 Granulocyte %  64.2 %  N  38-83  

 

 Lymphocyte %  25.5 %  N  25-47  

 

 Monocyte %  8.1 %  High  0-7  

 

 Eosinophil %  1.5 %  N  0-6  

 

 Basophil %  0.7 %  N  0-2  

 

 Nucleated Red Blood Cells %  0      









 Protein  2018  [facility]  Total  6.7 g/dL    6.3 -  



 Electrophoresis      Protein(Pep)      7.9  



     [place] [address] [phone]          









 Albumin  3.2 g/dL  Abnormal  3.4-4.7  

 

 Alpha-1 Globulin  0.2 g/dL    0.1-0.3  

 

 Alpha-2 Globulin  1.2 g/dL  Abnormal  0.6-1.0  

 

 Beta Globulin  1.2 g/dL    0.7-1.2  

 

 Gamma Globulin  0.9 g/dL    0.6-1.6  

 

 Albumin/Globulin Ratio  0.93      

 

 Impression  See Comment      6









 Laboratory test  2018  [facility]  Erythrocyte Sed  15 mm/Hr  
N  0-40  



 finding     [address]  Rate        



     [place] [address] [phone]          

 

 Lipid Profile  2018  [facility]  Triglycerides  169 mg/dL    
  7



 (Trig/Chol/HDL)     [address]          



     [place] [address] [phone]          









 Cholesterol  140 mg/dL      8

 

 HDL Cholesterol  42.0 mg/dL      9

 

 LDL Cholesterol  64 mg/dL      10









 Comp Metabolic Panel  2018  [facility]  Sodium  139 mmol/L  N
  139-145  



      [address]          



     [place] [address] [phone]          









 Potassium  4.5 mmol/L  N  3.5-5.0  

 

 Chloride  105 mmol/L  N  101-111  

 

 Co2 Carbon Dioxide  27 mmol/L  N  22-32  

 

 Anion Gap  7 mmol/L  N  2-11  

 

 Glucose  87 mg/dL  N    

 

 Blood Urea Nitrogen  14 mg/dL  N  6-24  

 

 Creatinine  0.64 mg/dL  N  0.51-0.95  

 

 BUN/Creatinine Ratio  21.9  High  8-20  

 

 Calcium  9.6 mg/dL  N  8.6-10.3  

 

 Total Protein  6.2 g/dL  Low  6.4-8.9  

 

 Albumin  3.9 g/dL  N  3.2-5.2  

 

 Globulin  2.3 g/dL  N  2-4  

 

 Albumin/Globulin Ratio  1.7  N  1-3  

 

 Total Bilirubin  0.40 mg/dL  N  0.2-1.0  

 

 Alkaline Phosphatase  56 U/L  N    

 

 Alt  22 U/L  N  7-52  

 

 Ast  21 U/L  N  13-39  

 

 Egfr Non-  87.8    >60  

 

 Egfr   112.9    >60  11









 Lipid Profile  2017  [facility]  Triglycerides  159 mg/dL  N 
   12



 (Trig/Chol/HDL)    [address] [place] [address] [phone]          









 Cholesterol  140 mg/dL  N    13

 

 HDL Cholesterol  39.3 mg/dL  N    14

 

 LDL Cholesterol  69 mg/dL  N    15









 Comp Metabolic Panel  2017  [facility]  Sodium  138 mmol/L  N
  133-145  



     101 DATES [place] [address] [phone]          









 Potassium  4.1 mmol/L  N  3.5-5.0  

 

 Chloride  107 mmol/L  N  101-111  

 

 Co2 Carbon Dioxide  29 mmol/L  N  22-32  

 

 Anion Gap  2 mmol/L  N  2-11  

 

 Glucose  92 mg/dL  N    

 

 Blood Urea Nitrogen  15 mg/dL  N  6-24  

 

 Creatinine  0.62 mg/dL  N  0.51-0.95  

 

 BUN/Creatinine Ratio  24.2  High  8-20  

 

 Calcium  9.1 mg/dL  N  8.6-10.3  

 

 Total Protein  6.2 g/dL  Low  6.4-8.9  

 

 Albumin  3.8 g/dL  N  3.2-5.2  

 

 Globulin  2.4 g/dL  N  2-4  

 

 Albumin/Globulin Ratio  1.6  N  1-3  

 

 Total Bilirubin  0.50 mg/dL  N  0.2-1.0  

 

 Alkaline Phosphatase  58 U/L  N    

 

 Alt  22 U/L  N  7-52  

 

 Ast  20 U/L  N  13-39  

 

 Egfr Non-  91.1  N  >60  

 

 Egfr   117.1  N  >60  16









 CBC Auto Diff  2017  [facility]  White Blood  7.1 10^3/uL  N  
3.5-10.8  



     [address]  Count        



     [place] [address] [phone]          









 Red Blood Count  4.19 10^6/uL  N  4.0-5.4  

 

 Hemoglobin  13.1 g/dL  N  12.0-16.0  

 

 Hematocrit  40 %  N  35-47  

 

 Mean Corpuscular Volume  96 fL  N  80-97  

 

 Mean Corpuscular Hemoglobin  31 pg  N  27-31  

 

 Mean Corpuscular HGB Conc  33 g/dL  N  31-36  

 

 Red Cell Distribution Width  15 %  N  10.5-15  

 

 Platelet Count  229 10^3/uL  N  150-450  

 

 Mean Platelet Volume  9 um3  N  7.4-10.4  

 

 Abs Neutrophils  4.3 10^3/uL  N  1.5-7.7  

 

 Abs Lymphocytes  2.0 10^3/uL  N  1.0-4.8  

 

 Abs Monocytes  0.6 10^3/uL  N  0-0.8  

 

 Abs Eosinophils  0.1 10^3/uL  N  0-0.6  

 

 Abs Basophils  0.1 10^3/uL  N  0-0.2  

 

 Abs Nucleated RBC  0 10^3/uL  N    

 

 Granulocyte %  61.0 %  N  38-83  

 

 Lymphocyte %  28.3 %  N  25-47  

 

 Monocyte %  8.1 %  N  1-9  

 

 Eosinophil %  1.9 %  N  0-6  

 

 Basophil %  0.7 %  N  0-2  

 

 Nucleated Red Blood Cells %  0  N    









 Urine Culture And  2017  [facility]  Urine Culture  SEE 
RESULT      17



 Sensitivities    [address]    BELOW      



     [place] [address] [phone]          

 

 Ua Routine  2017  [facility]  Ua Specific  1.005      



       Gravity        









 Ua PH  8      

 

 Ua Color  yellow      

 

 Ua Appera  clear      

 

 Ua WBC  trace      

 

 Ua Protein  neg      

 

 Ua Glucose  neg      

 

 Ua Ketones  neg      

 

 Ua Bilirubin  neg      

 

 Ua Urobilinogen  neg      

 

 Ua Nitrite  neg      

 

 Ua Occult Blood  neg      









 Lipid Profile  12/15/2016  [facility]  Triglycerides  137 mg/dL  N 
   18



 (Trig/Chol/HDL)    [address]          



     [place] [address] [phone]          









 Cholesterol  147 mg/dL  N    19

 

 HDL Cholesterol  48.2 mg/dL  N    20

 

 LDL Cholesterol  71 mg/dL  N    21









 Comp Metabolic Panel  12/15/2016  [facility]  Sodium  140 mmol/L  N
  133-145  



     [address]          



     [place] [address] [phone]          









 Potassium  4.2 mmol/L  N  3.5-5.0  

 

 Chloride  105 mmol/L  N  101-111  

 

 Co2 Carbon Dioxide  29 mmol/L  N  22-32  

 

 Anion Gap  6 mmol/L  N  2-11  

 

 Glucose  92 mg/dL  N    

 

 Blood Urea Nitrogen  15 mg/dL  N  6-24  

 

 Creatinine  0.62 mg/dL  N  0.51-0.95  

 

 BUN/Creatinine Ratio  24.2  High  8-20  

 

 Calcium  9.2 mg/dL  N  8.6-10.3  

 

 Total Protein  6.5 g/dL  N  6.4-8.9  

 

 Albumin  4.0 g/dL  N  3.2-5.2  

 

 Globulin  2.5 g/dL  N  2-4  

 

 Albumin/Globulin Ratio  1.6  N  1-3  

 

 Total Bilirubin  0.50 mg/dL  N  0.2-1.0  

 

 Alkaline Phosphatase  58 U/L  N    

 

 Alt  25 U/L  N  7-52  

 

 Ast  20 U/L  N  13-39  

 

 Egfr Non-  91.3  N  >60  

 

 Egfr   117.4  N  >60  22









 Comp Metabolic Panel  2015  [facility]  Sodium  140 mmol/L  N
  133-145  



     [address] [place] [address] [phone]          









 Potassium  4.1 mmol/L  N  3.5-5.0  

 

 Chloride  105 mmol/L  N  101-111  

 

 Co2 Carbon Dioxide  28 mmol/L  N  22-32  

 

 Anion Gap  7 mmol/L  N  2-11  

 

 Glucose  96 mg/dL  N    

 

 Blood Urea Nitrogen  15 mg/dL  N  6-24  

 

 Creatinine  0.72 mg/dL  N  0.51-0.95  

 

 BUN/Creatinine Ratio  20.8  High  8-20  

 

 Calcium  9.3 mg/dL  N  8.6-10.3  

 

 Total Protein  6.5 g/dL  N  6.4-8.9  

 

 Albumin  4.1 g/dL  N  3.2-5.2  

 

 Globulin  2.4 g/dL  N  2-4  

 

 Albumin/Globulin Ratio  1.7  N  1-3  

 

 Total Bilirubin  0.50 mg/dL  N  0.2-1.0  

 

 Alkaline Phosphatase  58 U/L  N    

 

 Alt  24 U/L  N  7-52  

 

 Ast  22 U/L  N  13-39  

 

 Egfr Non-  77.0  N  >60  

 

 Egfr   99.0  N  >60  23









 Lipid Profile  2015  [facility]  Triglycerides  149 mg/dL  N 
   24



 (Trig/Chol/HDL)    [address] [place] [address] [phone]          









 Cholesterol  157 mg/dL  N    25

 

 HDL Cholesterol  47.7 mg/dL  N    26

 

 LDL Cholesterol  80 mg/dL  N    27









 CKMB  2015  [facility]  CKMB  ng/mL  1.7 ng/mL  N  0.6-6.3  



     [address]          



     [place] [address] [phone]          

 

 Laboratory test  2015  [facility]  Troponin-I  0.00 ng/mL  N  
<0.03  28



 finding    [address]  (TnI)        



     [place] [address] [phone]          

 

 CBC Auto Diff  2015  [facility]  White Blood  6.5 10^3/uL  N  
3.5-10.8  



     [address]  Count        



     [place] [address] [phone]          









 Red Blood Count  4.28 10^6/uL  N  4.0-5.4  

 

 Hemoglobin  13.8 g/dL  N  12.0-16.0  

 

 Hematocrit  42 %  N  35-47  

 

 Mean Corpuscular Volume  98 fL  High  80-97  

 

 Mean Corpuscular Hemoglobin  32 pg  High  27-31  

 

 Mean Corpuscular HGB Conc  33 g/dL  N  31-36  

 

 Red Cell Distribution Width  14 %  N  10.5-15  

 

 Platelet Count  218 10^3/uL  N  150-450  

 

 Mean Platelet Volume  9 um3  N  7.4-10.4  

 

 Abs Neutrophils  4.1 10^3/uL  N  1.5-7.7  

 

 Abs Lymphocytes  1.8 10^3/uL  N  1.0-4.8  

 

 Abs Monocytes  0.5 10^3/uL  N  0-0.8  

 

 Abs Eosinophils  0.1 10^3/uL  N  0-0.6  

 

 Abs Basophils  0 10^3/uL  N  0-0.2  

 

 Abs Nucleated RBC  0 10^3/uL  N    

 

 Granulocyte %  62.2 %  N  38-83  

 

 Lymphocyte %  27.8 %  N  25-47  

 

 Monocyte %  7.8 %  N  1-9  

 

 Eosinophil %  1.6 %  N  0-6  

 

 Basophil %  0.6 %  N  0-2  

 

 Nucleated Red Blood Cells %  0  N    









 Laboratory test  2015  [facility]  B-Type Natriuretic  74 pg/
mL  N    29



 finding    [address]  Peptide BNP        



     [place] [address] [phone]          

 

 Lipid Profile  2014  [facility]  Triglycerides  202 mg/dL  N 
   30, 31



 (Trig/Chol/HDL)    [address]          



     [place] [address]          



     [phone]          









 Cholesterol  152 mg/dL  N    32

 

 HDL Cholesterol  44.5 mg/dL  N    33

 

 LDL Cholesterol  67 mg/dL  N    34









 Comp Metabolic Panel  2014  [facility]  Sodium  139 mmol/L  N
  133-145  



     [address]          



     [place], [place] [id number] [phone]          









 Potassium  4.2 mmol/L  N  3.5-5.0  35

 

 Chloride  104 mmol/L  N  101-111  

 

 Co2 Carbon Dioxide  30 mmol/L  N  22-32  

 

 Anion Gap  5 mmol/L  N  2-11  

 

 Glucose  93 mg/dL  N    

 

 Blood Urea Nitrogen  10 mg/dL  N  6-24  

 

 Creatinine  0.66 mg/dL  N  0.51-0.95  

 

 BUN/Creatinine Ratio  15.2  N  8-20  

 

 Calcium  9.3 mg/dL  N  8.6-10.3  

 

 Total Protein  6.3 g/dL  Low  6.4-8.9  

 

 Albumin  3.9 g/dL  N  3.2-5.2  

 

 Globulin  2.4 g/dL  N  2-4  

 

 Albumin/Globulin Ratio  1.6  N  1-3  

 

 Total Bilirubin  0.50 mg/dL  N  0.2-1.0  

 

 Alkaline Phosphatase  63 U/L  N    

 

 Alt  24 U/L  N  7-52  

 

 Ast  22 U/L  N  13-39  

 

 Egfr Non-  85.3  N  >60  

 

 Egfr   109.7  N  >60  36









 Throat-Beta  2014  [facility]  Throat Beta Strep  (SEE      37
, 38



 Strept    [address]  Culture  NOTE)      



     [place] [address]          



     [phone]          

 

 Surgical  2014  [facility]  S  RUN DATE:      39



 Pathology    [address]    /      



     [place] [address]    <SEE      



     [phone]    NOTE>      

 

 Surgical  04/15/2014  [facility]  S  RUN DATE:      40



 Pathology    [address]    04/15/      



     [place] [address]    <SEE      



     [phone]    NOTE>      

 

 Lipid Profile  10/10/2013  [facility]  Triglycerides  155 mg/dL    
40-2  



 (Trig/Chol/HDL)    [address]        [address]  



     [place] [address] [phone]          









 Cholesterol  149 mg/dL    Less than 200  

 

 HDL Cholesterol  47 mg/dL    40-60  41

 

 Cholesterol/HDL Ratio  3.2 Average    1-4.44  

 

 LDL Cholesterol  71.0    Less Than 100  42









 Comp Metabolic Panel  10/10/2013  [facility]  Sodium  140 mmol/L    
133-145  



     [address]          



     [place] [address] [phone]          









 Potassium  4.1 mmol/L    3.5-5.0  

 

 Chloride  107 mmol/L    101-111  

 

 Co2 Carbon Dioxide  29.0 mmol/L    22-32  

 

 Anion Gap  4.0 mmol/L    2-11  

 

 Glucose  97 mg/dL      

 

 Blood Urea Nitrogen  10 mg/dL    6-24  

 

 Creatinine  0.60 mg/dL    0.50-1.40  

 

 BUN/Creatinine Ratio  16.7    8-20  

 

 Calcium  9.1 mg/dL    8.1-9.9  

 

 Total Protein  6.1 g/dL  Low  6.2-8.1  

 

 Albumin  3.6 g/dL    3.2-5.2  

 

 Globulin  2.5 g/dL    2-4  

 

 Albumin/Globulin Ratio  1.4    1-3  

 

 Total Bilirubin  0.5 mg/dL    0.4-1.5  

 

 Alkaline Phosphatase  63 U/L      

 

 Alt  29 U/L    14-54  

 

 Ast  29 U/L    12-42  

 

 Egfr Non-  95.5    >60  

 

 Egfr   122.8    >60  43









 CBC W/Electronic  2013  [facility]  White Blood  6.9 10^3/uL 
   4.8-10.8  



 Diff    [address]  Count        



     [place] [address] [phone]          









 Red Blood Count  4.12 10^6/uL    4.0-5.4  

 

 Hemoglobin  13.2 g/dL    12.0-16.0  

 

 Hematocrit  39 %    35-47  

 

 Mean Corpuscular Volume  95 fL    80-97  

 

 Mean Corpuscular Hemoglobin  32 pg  High  27-31  

 

 Mean Corpuscular HGB Conc  34 g/dL    31-36  

 

 Red Cell Distribution Width  14 %    10.5-15  

 

 Platelet Count  245 10^3/uL    150-450  

 

 Mean Platelet Volume  9 um3    7.4-10.4  

 

 Abs Neutrophils  4.3 10^3/uL    1.5-7.7  

 

 Abs Lymphocytes  2.0 10^3/uL    1.0-4.8  

 

 Abs Monocytes  0.5 10^3/uL    0-0.8  

 

 Abs Eosinophils  0.2 10^3/uL    0-0.6  

 

 Abs Basophils  0 10^3/uL    0-0.2  

 

 Abs Nucleated RBC  0 10^3/uL      

 

 Granulocyte %  61.6 %    38-83  

 

 Lymphocyte %  28.9 %    25-47  

 

 Monocyte %  6.8 %    1-9  

 

 Eosinophil %  2.2 %    0-6  

 

 Basophil %  0.5 %    0-2  

 

 Nucleated Red Blood Cells %  0      









 CMP Panel  2013  [facility]  Sodium  141 mmol/L    133-145  



     [address] [place] [address] [phone]          









 Potassium  4.0 mmol/L    3.5-5.0  

 

 Chloride  107 mmol/L    101-111  

 

 Co2 Carbon Dioxide  27.0 mmol/L    22-32  

 

 Anion Gap  7.0 mmol/L    2-11  

 

 Glucose  142 mg/dL  High    

 

 Blood Urea Nitrogen  12 mg/dL    6-24  

 

 Creatinine  0.70 mg/dL    0.50-1.40  

 

 BUN/Creatinine Ratio  17.1    8-20  

 

 Calcium  9.6 mg/dL    8.1-9.9  

 

 Total Protein  6.2 g/dL    6.2-8.1  

 

 Albumin  3.6 g/dL    3.2-5.2  

 

 Globulin  2.6 g/dL    2-4  

 

 Albumin/Globulin Ratio  1.4    1-3  

 

 Total Bilirubin  0.7 mg/dL    0.4-1.5  

 

 Alkaline Phosphatase  62 U/L      

 

 Alt  32 U/L    14-54  

 

 Ast  27 U/L    12-42  

 

 Egfr Non-  79.9    >60  

 

 Egfr   102.8    >60  44









 Ua Routine  2013  [facility]  Ua Specific Gravity  1.005      









 Ua PH  5.0      

 

 Ua Color  neg      

 

 Ua Appera  neg      

 

 Ua WBC  neg      

 

 Ua Protein  neg      

 

 Ua Glucose  neg      

 

 Ua Ketones  neg      

 

 Ua Bilirubin  neg      

 

 Ua Urobilinogen  neg      

 

 Ua Nitrite  neg      

 

 Ua Occult Blood  neg      









 Laboratory test finding  10/23/2012  [facility]  Alt  26 U/L    14-
54  45



     [address]  [place] [address] [phone]          









 Ast  25 U/L    12-42  46

 

 TSH (Thyroid Stimulating Horm)  2.57 MIU/ML    0.34-5.60  47

 

 Vitamin B12  336 pg/mL    180-914  48









 Lipid Profile  10/23/2012  [facility]  Triglycerides  95 mg/dL    40
-200  



 (Trig/Chol/HDL)    [address] [place] [address] [phone]          









 Cholesterol  157 mg/dL    Less than 200  49

 

 HDL Cholesterol  57 mg/dL    40-60  50

 

 Cholesterol/HDL Ratio  2.8 AVERAGE    1-4.44  

 

 LDL Cholesterol  81.0 mg/dL    Less Than 100  









 Comp Metabolic Panel  2012  [facility]  Sodium  140 mmol/L    
135-145  



     [address]  [place] [address] [phone]          









 Potassium  4.1 mmol/L    3.5-5.0  

 

 Chloride  105 mmol/L    101-111  

 

 Co2 (Carbon Dioxide)  31.0 mmol/L    22-32  

 

 Anion Gap  4.0 mmol/L    2-11  51

 

 Glucose  96 mg/dL      

 

 BUN  16 mg/dL    6-24  

 

 Creatinine  0.8 mg/dL    0.50-1.40  

 

 One Over Creatinine  1.25      

 

 BUN/Creatinine Ratio  20.0    8-20  

 

 Calcium  9.3 mg/dL    8.1-9.9  

 

 Total Protein  6.2 GM/DL    6.2-8.1  

 

 Albumin  3.8 GM/DL    3.2-5.2  

 

 Globulin  2.4 GM/DL    2-4  

 

 Albumin/Globulin Ratio  1.6    1-3  

 

 Bilirubin Total  0.5 mg/dL    0.4-1.5  52

 

 Alkaline Phosphatase  77 U/L      

 

 Alt (SGPT)  28 U/L    14-54  

 

 Ast (Sgot)  27 U/L    12-42  

 

 eGFR Non-  68.8    > 60  

 

 eGFR   88.5    > 60  53









 Lipid Profile  2012  [facility]  Triglyceride  137 mg/dL    40
-200  



 (Trig/Chol/HDL)    [address]  [place] [address] [phone]          









 Cholesterol  184 mg/dL    Less Than 200  54

 

 High Density Lipoprotein  50 mg/dL    40-60  55

 

 Cholesterol/HDL Ratio  3.68 AVERAGE    1-4.44  

 

 Low Density Lipoprotein  107 mg/dL  High  Less Than 100  56









 Lipid Profile  2011  [facility]  Triglyceride  92 mg/dL    40-
200  



 (Trig/Chol/HDL)    [address]  [place] [address] [phone]          









 Cholesterol  156 mg/dL    Less Than 200  57

 

 High Density Lipoprotein  46 mg/dL    40-60  58

 

 Cholesterol/HDL Ratio  3.39 AVERAGE    1-4.44  

 

 Low Density Lipoprotein  92 mg/dL    Less Than 100  59









 Comp Metabolic Panel  2011  [facility]  Sodium  142 mmol/L    
135-145  



     [address]          



     [place] [address] [phone]          









 Potassium  4.6 mmol/L    3.5-5.0  

 

 Chloride  107 mmol/L    101-111  

 

 Co2 (Carbon Dioxide)  30.0 mmol/L    22-32  

 

 Anion Gap  5.0 mmol/L    2-11  60

 

 Glucose  103 mg/dL  High    

 

 BUN  12 mg/dL    6-24  

 

 Creatinine  0.7 mg/dL    0.50-1.40  

 

 One Over Creatinine  1.42      

 

 BUN/Creatinine Ratio  17.1    8-20  

 

 Calcium  9.4 mg/dL    8.1-9.9  

 

 Total Protein  6.3 GM/DL    6.2-8.1  

 

 Albumin  3.7 GM/DL    3.2-5.2  

 

 Globulin  2.6 GM/DL    2-4  

 

 Albumin/Globulin Ratio  1.4    1-3  

 

 Bilirubin Total  0.5 mg/dL    0.4-1.5  61

 

 Alkaline Phosphatase  61 U/L      

 

 Alt (SGPT)  25 U/L    14-54  

 

 Ast (Sgot)  28 U/L    12-42  

 

 eGFR Non-  80.3    > 60  

 

 eGFR   103.3    > 60  62









 Manual Differential  2011  [facility]  Polysegmented  82 %    
38-83  



     [address]  Neutrophil        



     [place] [address] [phone]          









 Band Neutrophil  4 %    0-8  

 

 Lymphocyte  11 %  Low  25-47  

 

 Monocyte  3 %    0-13  

 

 Absolute Neutrophil Count  9.9      

 

 RBC Morphology  NORMAL      









 CBC With  2011  [facility]  White Blood  11.6 CUMM  High  4.8-
10.8  



 Electronic Diff    [address]  Count        



     [place] [address] [phone]          









 Red Cell Count  3.99 CUMM  Low  4.2-5.4  

 

 Hemoglobin  12.9 g/dL    12.0-16.0  

 

 Hematocrit  38 %    35-47  

 

 Mean Corpuscular Volume  95 um3    79-97  

 

 Mean Corpuscular Hemoglob  32 pg  High  27-31  

 

 Mean Corpuscular HGB Cone  34 g/dL    32-36  

 

 Redcell Distribution WDTH  14 %    10.5-15  

 

 Platelet Count  259 CUMM    150-450  

 

 Mean Platelet Volume  8.4 um3    7.4-10.4  63









 Comp Metabolic Panel  2011  [facility]  Sodium  137 mmol/L    
135-145  



     [address]          



     [place] [address] [phone]          









 Potassium  4.4 mmol/L    3.5-5.0  

 

 Chloride  101 mmol/L    101-111  

 

 Co2 (Carbon Dioxide)  27.0 mmol/L    22-32  

 

 Anion Gap  9.0 mmol/L    2-11  64

 

 Glucose  80 mg/dL      

 

 BUN  10 mg/dL    6-24  

 

 Creatinine  0.80 mg/dL    0.50-1.40  

 

 One Over Creatinine  1.20      

 

 BUN/Creatinine Ratio  12.5    8-20  

 

 Calcium  9.1 mg/dL    8.1-9.9  

 

 Total Protein  6.1 GM/DL  Low  6.2-8.1  

 

 Albumin  3.4 GM/DL    3.2-5.2  

 

 Globulin  2.7 GM/DL    2-4  

 

 Albumin/Globulin Ratio  1.3    1-3  

 

 Bilirubin Total  0.7 mg/dL    0.4-1.5  65

 

 Alkaline Phosphatase  75 U/L      

 

 Alt (SGPT)  24 U/L    14-54  

 

 Ast (Sgot)  25 U/L    12-42  

 

 eGFR Non-  73.4    > 60  

 

 eGFR   88.8    > 60  66









 Urine Culture &  2010  [facility]  Urine Culture  NG      67



 Sensitivi    [address] [facility]  Sensitivi        



     [place] [address] [phone]          

 

 Laboratory test  2010  [facility]  Amylase  48 U/L      
68



 finding    [address] [place] [address] [phone]          









 Lipase  18 U/L  Low  22-51  

 

 C Reactive Protein  2.3 mg/dL  High  Less Than 0.5  









 Laboratory test  2010  [facility]  Stool For  NEGATIVE    
Negative  



 finding    [address]  [facility]  Blood        



     [place] [address] [phone]          

 

 CBC With  2010  [facility]  White Blood  6.6 CUMM    4.8-10.8
  



 Electronic Diff    [address] [facility]  Count        



     [place] [address] [phone]          









 Red Cell Count  4.11 CUMM  Low  4.2-5.4  

 

 Hemoglobin  13.4 g/dL    12.0-16.0  

 

 Hematocrit  39 %    35-47  

 

 Mean Corpuscular Volume  95 um3    79-97  

 

 Mean Corpuscular Hemoglob  33 pg  High  27-31  

 

 Mean Corpuscular HGB Cone  34 g/dL    32-36  

 

 Redcell Distribution WDTH  13 %    10.5-15  

 

 Platelet Count  239 CUMM    150-450  

 

 Mean Platelet Volume  7.4 um3    7.4-10.4  

 

 Gran %  70.8 %    38-83  

 

 Lymph %  18.7 %  Low  25-47  

 

 Mononuclear %  9.7 %  High  1-9  

 

 Eosinophil %  0.8 %    0-6  

 

 Basophil %  0 %    0-2  

 

 Abs Lymphs  1.2    1.0-4.8  

 

 Abs Mononuclear  0.6    0-0.8  

 

 Absolute Neutrophil Count  4.7    1.5-7.7  

 

 Abs Eosinophils  0.1    0-0.6  

 

 Abs Basophils  0    0-0.2  69









 Urinalysis W/Microscopic  2010  [facility]  Ua Color  YELLOW 
   Yellow  



     [address]  [place] [address] [phone]          









 Appearance-Urine  CLEAR    Clear  

 

 Specific Gravity-Ur  1.027    1.010-1.030  

 

 Esterase-Urine  1+  Abnormal  Negative  

 

 Nitrite  NEGATIVE    Negative  

 

 Urobilinogen-Ur-DIP  NEGATIVE    Negative  

 

 Protein-Urine  TRACE  Abnormal  Negative  

 

 PH-Urine  5.5    5-9  

 

 Blood-Urine  TRACE  Abnormal  Negative  

 

 Ketones-Urine  NEGATIVE    Negative  

 

 Bilirubin-Ur  NEGATIVE    Negative  

 

 Glucose-Urine  NEGATIVE    Negative  

 

 WBC-Urine  5-10  Abnormal  0-5  

 

 RBC-Urine  0-2    0-2  

 

 Mucus Urine  MODERATE    None  

 

 Epith Cells-Ur  FEW    None  









 Comp Metabolic Panel  2010  [facility]  Sodium  137 mmol/L    
135-145  



     [address] [place] [address] [phone]          









 Potassium  4.3 mmol/L    3.5-5.0  

 

 Chloride  106 mmol/L    101-111  

 

 Co2 (Carbon Dioxide)  24.0 mmol/L    22-32  

 

 Anion Gap  7.0 mmol/L    2-11  70

 

 Glucose  110 mg/dL  High    

 

 BUN  18 mg/dL    6-24  

 

 Creatinine  0.70 mg/dL    0.50-1.40  

 

 One Over Creatinine  1.40      

 

 BUN/Creatinine Ratio  25.7  High  8-20  

 

 Calcium  8.8 mg/dL    8.1-9.9  

 

 Total Protein  6.2 GM/DL    6.2-8.1  

 

 Albumin  3.6 GM/DL    3.2-5.2  

 

 Globulin  2.6 GM/DL    2-4  

 

 Albumin/Globulin Ratio  1.4    1-3  

 

 Bilirubin Total  0.7 mg/dL    0.4-1.5  71

 

 Alkaline Phosphatase  62 U/L      

 

 Alt (SGPT)  23 U/L    14-54  

 

 Ast (Sgot)  25 U/L    12-42  

 

 eGFR Non-  85.6    > 60  

 

 eGFR   103.5    > 60  72









 Comp Metabolic Panel  2010  [facility]  Sodium  141 mmol/L    
135-145  



     [address] [place] [address] [phone]          









 Potassium  4.1 mmol/L    3.5-5.0  

 

 Chloride  106 mmol/L    101-111  

 

 Co2 (Carbon Dioxide)  29.0 mmol/L    22-32  

 

 Anion Gap  6.0 mmol/L    2-11  73

 

 Glucose  85 mg/dL      74

 

 BUN  19 mg/dL    6-24  

 

 Creatinine  0.70 mg/dL    0.50-1.40  

 

 One Over Creatinine  1.40      

 

 BUN/Creatinine Ratio  27.1  High  8-20  

 

 Calcium  9.5 mg/dL    8.1-9.9  75

 

 Total Protein  6.4 GM/DL    6.2-8.1  

 

 Albumin  3.9 GM/DL    3.2-5.2  

 

 Globulin  2.5 GM/DL    2-4  

 

 Albumin/Globulin Ratio  1.6    1-3  

 

 Bilirubin Total  0.7 mg/dL    0.4-1.5  76

 

 Alkaline Phosphatase  52 U/L      

 

 Alt (SGPT)  23 U/L    14-54  

 

 Ast (Sgot)  22 U/L    12-42  

 

 eGFR Non-  85.6    > 60  

 

 eGFR   103.5    > 60  77









 Lipid Profile  2010  [facility]  Triglyceride  182 mg/dL    40
-200  



 (Trig/Chol/HDL)    [address] [place] [address] [phone]          









 Cholesterol  156 mg/dL    Less Than 200  78

 

 High Density Lipoprotein  37 mg/dL  Low  40-60  79

 

 Cholesterol/HDL Ratio  4.22 AVERAGE    1-4.44  

 

 Low Density Lipoprotein  83 mg/dL    Less Than 100  80









 1  Desirable: <150



   Borderline High: 150-199



   High: 200-499



   Very High: >500

 

 2  Desirable: <200



   Borderline High: 200-239



   High: >239

 

 3  Low: <40



   Desirable: 40-60



   High: >60

 

 4  Desirable: <100



   Near Optimal: 100-129



   Borderline High: 130-159



   High: 160-189



   Very High: >189

 

 5  *******Because ethnic data is not always readily available,



   this report includes an eGFR for both -Americans and



   non- Americans.****



   The National Kidney Disease Education Program (NKDEP) does



   not endorse the use of the MDRD equation for patients that



   are not between the ages of 18 and 70, are pregnant, have



   extremes of body size, muscle mass, or nutritional status,



   or are non- or non-.



   According to the [employer], irrespective of



   diagnosis, the stage of the disease is based on the level of



   kidney function:



   Stage Description                      GFR(mL/min/1.73 m(2))



   1     Kidney damage with normal or decreased GFR       90



   2     Kidney damage with mild decrease in GFR          60-89



   3     Moderate decrease in GFR                         30-59



   4     Severe decrease in GFR                           15-29



   5     Kidney failure                       <15 (or dialysis)

 

 6  RESULT: No apparent monoclonal protein on serum electrophoresis.



   Test Performed by:



   [address] [id number]

 

 7  Desirable: <150



   Borderline High: 150-199



   High: 200-499



   Very High: >500

 

 8  Desirable: <200



   Borderline High: 200-239



   High: >239

 

 9  Low: <40



   Desirable: 40-60



   High: >60

 

 10  Desirable: <100



   Near Optimal: 100-129



   Borderline High: 130-159



   High: 160-189



   Very High: >189

 

 11  *******Because ethnic data is not always readily available,



   this report includes an eGFR for both -Americans and



   non- Americans.****



   The National Kidney Disease Education Program (NKDEP) does



   not endorse the use of the MDRD equation for patients that



   are not between the ages of 18 and 70, are pregnant, have



   extremes of body size, muscle mass, or nutritional status,



   or are non- or non-.



   According to the [employer], irrespective of



   diagnosis, the stage of the disease is based on the level of



   kidney function:



   Stage Description                      GFR(mL/min/1.73 m(2))



   1     Kidney damage with normal or decreased GFR       90



   2     Kidney damage with mild decrease in GFR          60-89



   3     Moderate decrease in GFR                         30-59



   4     Severe decrease in GFR                           15-29



   5     Kidney failure                       <15 (or dialysis)

 

 12  Desirable <150



   Borderline high 150-199



   High 200-499



   Very High >500

 

 13  Desirable <200



   Borderline high 200-239



   High >239

 

 14  Low <40



   Desirable: 40-60



   High: >60

 

 15  Desirable: <100 mg/dL



   Near Optimal: 100-129 mg/dL



   Borderline High: 130-159 mg/dL



   High: 160-189 mg/dL



   Very High: >189 mg/dL

 

 16  *******Because ethnic data is not always readily available,



   this report includes an eGFR for both -Americans and



   non- Americans.****



   The National Kidney Disease Education Program (NKDEP) does



   not endorse the use of the MDRD equation for patients that



   are not between the ages of 18 and 70, are pregnant, have



   extremes of body size, muscle mass, or nutritional status,



   or are non- or non-.



   According to the National Kidney Foundation, irrespective of



   diagnosis, the stage of the disease is based on the level of



   kidney function:



   Stage Description                      GFR(mL/min/1.73 m(2))



   1     Kidney damage with normal or decreased GFR       90



   2     Kidney damage with mild decrease in GFR          60-89



   3     Moderate decrease in GFR                         30-59



   4     Severe decrease in GFR                           15-29



   5     Kidney failure                       <15 (or dialysis)

 

 17  SEE RESULT BELOW



   -----------------------------------------------------------------------------
---------------



   Name:  SHIELA SHEETS                 : 1930    Attend Dr: 
Tracy Carlin MD



   Acct:  [id number]  Unit: [id number]  AGE: 87            Location:  [facility]



   Re17                        SEX: F             Status:    REG REF



   -----------------------------------------------------------------------------
---------------



   



   SPEC: [id number]         ALEAH:       [facility] DR: Tracy Carlin MD



   REQ:  [id number]              RECD:   



   STATUS: COMP



   _



   SOURCE: URINE          [facility]:



   ORDERED:  Urine Culture



   COMMENTS: [id number]



   Urine Source: Random



   



   -----------------------------------------------------------------------------
---------------



   Procedure                         Result                         Reported   
        Site



   -----------------------------------------------------------------------------
---------------



   Urine Culture  Final                                             17-
1605      ML



   No Growth (<1,000 CFU/mL)



   



   -----------------------------------------------------------------------------
---------------



   * ML - MAIN LAB ([facility])



   .



   



   



   



   



   



   



   



   



   



   



   



   



   



   



   



   



   



   



   



   



   



   



   



   



   ** END OF REPORT **



   



   * ML=Testing performed at Main Lab



   DEPARTMENT OF PATHOLOGY,  [address] [place] [address]



   Phone # [phone]      Fax #[phone]



   Onel Hernandez M.D. Director     [facility] # [id number]

 

 18  Desirable <150



   Borderline high 150-199



   High 200-499



   Very High >500

 

 19  Desirable <200



   Borderline high 200-239



   High >239

 

 20  Low <40



   Desirable: 40-60



   High: >60

 

 21  Desirable: <100 mg/dL



   Near Optimal: 100-129 mg/dL



   Borderline High: 130-159 mg/dL



   High: 160-189 mg/dL



   Very High: >189 mg/dL

 

 22  *******Because ethnic data is not always readily available,



   this report includes an eGFR for both -Americans and



   non- Americans.****



   The National Kidney Disease Education Program (NKDEP) does



   not endorse the use of the MDRD equation for patients that



   are not between the ages of 18 and 70, are pregnant, have



   extremes of body size, muscle mass, or nutritional status,



   or are non- or non-.



   According to the National Kidney Foundation, irrespective of



   diagnosis, the stage of the disease is based on the level of



   kidney function:



   Stage Description                      GFR(mL/min/1.73 m(2))



   1     Kidney damage with normal or decreased GFR       90



   2     Kidney damage with mild decrease in GFR          60-89



   3     Moderate decrease in GFR                         30-59



   4     Severe decrease in GFR                           15-29



   5     Kidney failure                       <15 (or dialysis)

 

 23  *******Because ethnic data is not always readily available,



   this report includes an eGFR for both -Americans and



   non- Americans.****



   The National Kidney Disease Education Program (NKDEP) does



   not endorse the use of the MDRD equation for patients that



   are not between the ages of 18 and 70, are pregnant, have



   extremes of body size, muscle mass, or nutritional status,



   or are non- or non-.



   According to the National Kidney Foundation, irrespective of



   diagnosis, the stage of the disease is based on the level of



   kidney function:



   Stage Description                      GFR(mL/min/1.73 m(2))



   1     Kidney damage with normal or decreased GFR       90



   2     Kidney damage with mild decrease in GFR          60-89



   3     Moderate decrease in GFR                         30-59



   4     Severe decrease in GFR                           15-29



   5     Kidney failure                       <15 (or dialysis)

 

 24  Desirable <150



   Borderline high 150-199



   High 200-499



   Very High >500

 

 25  Desirable <200



   Borderline high 200-239



   High >239

 

 26  Low <40



   Desirable: 40-60



   High: >60

 

 27  Desirable: <100 mg/dL



   Near Optimal: 100-129 mg/dL



   Borderline High: 130-159 mg/dL



   High: 160-189 mg/dL



   Very High: >189 mg/dL

 

 28  Reference Range and Interpretation:



   TnI (ng/mL)             Interpretation



   Less Than 0.03 ng/mL    Not supportive of diagnosis of MI



   0.03 - 0.50 ng/mL       Indeterminate: suggest serial



   studies if clinically indicated.



   Greater than 0.5 ng/mL  Consistent with diagnosis of MI

 

 29  >100 to <200 pg/mL: likely compensated congestive heart



   failure (CHF)



   200 to 400 pg/mL: likely moderate CHF



   >400 pg/mL: likely moderate to severe CHF

 

 30  PT IS FASTING

 

 31  Desirable <150



   Borderline high 150-199



   High 200-499



   Very High >500

 

 32  Desirable <200



   Borderline high 200-239



   High >239

 

 33  Low <40



   Desirable: 40-60



   High: >60

 

 34  Desirable <100



   Near Optimal 100-129



   Borderline high 130-159



   High 160-189



   Very High >189

 

 35  **Potassium reference range changed effective 14**

 

 36  *******Because ethnic data is not always readily available,



   this report includes an eGFR for both -Americans and



   non- Americans.****



   The National Kidney Disease Education Program (NKDEP) does



   not endorse the use of the MDRD equation for patients that



   are not between the ages of 18 and 70, are pregnant, have



   extremes of body size, muscle mass, or nutritional status,



   or are non- or non-.



   According to the National Kidney Foundation, irrespective of



   diagnosis, the stage of the disease is based on the level of



   kidney function:



   Stage Description                      GFR(mL/min/1.73 m(2))



   1     Kidney damage with normal or decreased GFR       90



   2     Kidney damage with mild decrease in GFR          60-89



   3     Moderate decrease in GFR                         30-59



   4     Severe decrease in GFR                           15-29



   5     Kidney failure                       <15 (or dialysis)

 

 37  Comment: collected by micheline romano

 

 38  RUN DATE: 05/15/14               [facility] **LIVE**       
         PAGE    1



   RUN TIME: 900             [address] [place] [address]   [id number]



   Specimen Inquiry



   



   -----------------------------------------------------------------------------
---------------



   Name:  SHIELA SHEETS               : 1930    Attend Dr: Galdino Heredia MD



   Acct:  [id number]  Unit: [id number]  AGE: 83            Location:  [facility]



   Re14                        SEX: F             Status:    DEP ER



   -----------------------------------------------------------------------------
---------------



   



   SPEC: [id number]         ALEAH:       SUBM DR: Galdino Heredia MD



   REQ:  [id number]              RECD:   



   STATUS: TIMI SPARKS DR: Tracy Carlin MD



   _



   SOURCE: THROAT         SPDESC:



   ORDERED:  Throat Beta Str



   COMMENTS: Comment: collected by micheline romano



   



   -----------------------------------------------------------------------------
---------------



   Procedure                         Result                         Verified   
        Site



   -----------------------------------------------------------------------------
---------------



   Throat Beta Strep Culture  Final                                 05/15/14-
0900      ML



   Negative For Group A Beta Streptococcus



   



   -----------------------------------------------------------------------------
---------------



   



   



   



   



   



   



   



   



   



   



   



   



   



   



   



   



   



   



   



   



   



   



   



   



   



   



   



   



   ** END OF REPORT **



   



   * ML=Testing performed at Main Lab



   DEPARTMENT OF PATHOLOGY,  [facility] [place], [place] [address]



   Phone # [phone]      Fax #[phone]



   Onel Hernandez M.D. Director     [facility] # [id number]

 

 39  RUN DATE: 14               [facility] **LIVE**       
         PAGE    1



   RUN TIME: 1357             [facility] [place], [place]   [address]



   Specimen Inquiry



   



   -----------------------------------------------------------------------------
---------------



   Name:  SHIELA SHEETS               : 1930    Attend Dr: Mario Bergman MD



   Acct:  [id number]  Unit: [id number]  AGE: 83            Location:  OR



   Re14                        SEX: F             Status:    REG SDC



   -----------------------------------------------------------------------------
---------------



   



   SPEC: [id number]             ALEAH: 14-          SUBM DR: Mario Bergman MD



   REQ:  [id number]             RECD: [id number]



   STATUS: DEBI SPARKS DR: Tracy Carlin MD



   _



   ORDERED:  CONSULT W/ FS, FS ADDITIONAL, LEVEL IV



   



   FINAL DIAGNOSIS



   



   Skin, right upper cutaneous lip, biopsy:



   A. Basal cell carcinoma, nodular type.



   B. Deep, tip, and lateral margins of resection are clear.



   



   



   



   



   



   



   PATHOLOGY SURGICAL CONSULT



   



   Frozen section (FS)/Touch Prep (TP)/Gross Consult (GC)



   



   FS: Right upper cutaneous lip:



   A. Basal cell carcinoma (DS).



   B. Margins clear (DS).



   



   



   PRE-OPERATIVE DIAGNOSIS



   



   Basal cell carcinoma right upper cutaneous lip, suture marks twelve o'clock 
superior margin.



   



   GROSS DESCRIPTION



   



   The specimen is received fresh labeled Shiela Sheets, Excision BCC 
Right Upper



   Cutaneous Lip Suture Marks Twelve O'clock Superior Margin and consists of a 
2.1 x 0.8 cm.



   tan-pink skin ellipse excised to a depth of 0.5 cm.  There is a suture 
attached to one long



   axis which designates Twelve o'clock.  The specimen is inked as follows: 
nine o'clock half -



   black, three o'clock half - blue, and twelve o'clock tip - green, serially 
sectioned from



   twelve o'clock to six o'clock and entirely submitted for frozen section 
microscopy in



   cassettes FSA and FSB.  The frozen section residue is entirely submitted in 
cassettes FSA



   and FSB to include ellipse ends in cassette FSA.



   



   Signed __________(signature on file)___________ Onel Hernandez MD  [id number]



   -----------------------------------------------------------------------------
---------------



   



   ** END OF REPORT **



   



   * ML=Testing performed at Main Lab



   DEPARTMENT OF PATHOLOGY,  [facility] [place], [place] [address]



   Phone # [phone]      Fax #[phone]



   Onel Hernandez M.D. Director     [place] Permit  #[id number]

 

 40  RUN DATE: 04/15/14               [facility] **LIVE**       
         PAGE    1



   RUN TIME: 4687             [facility] [place], [place]   [address]



   Specimen Inquiry



   



   -----------------------------------------------------------------------------
---------------



   Name:  SHIELA SHEETS               : 1930    Attend Dr: Mario Bergman MD



   Acct:  [id number]  Unit: [id number]  AGE: 83            Location:  [facility]



   Re/15/14                        SEX: F             Status:    REG REF



   -----------------------------------------------------------------------------
---------------



   



   SPEC: [id number]             ALEAH: 04/15/14-          SUBM DR: Mario Bergman MD



   REQ:  [id number]             RECD: 04/15/



   STATUS: DEBI SPARKS DR: Tracy Ling MD



   _



   ORDERED:  Consult w/slide



   



   FINAL DIAGNOSIS



   



   Skin, right nasolabial fold, shave biopsy:



   A. Basal cell carcinoma, nodular type.



   B. Basal cell carcinoma is broadly transected along the base of this shave 
biopsy.



   



   



   



   



   



   



   PRE-OPERATIVE DIAGNOSIS



   



   Basal cell carcinoma.



   



   GROSS DESCRIPTION



   



   Received is one glass slide labeled [id number], Shiela Sheets [employer] .
  Accompanying



   this slide is a pathology report labeled Tracy Sheets, [id number] [employer],



   [address] [place] [address], [place] [address] signed by Dr. Stuart Doss on



   2014.



   



   Signed __________(signature on file)___________ Onel Hernandez MD 04/15/
14 1518



   



   -----------------------------------------------------------------------------
---------------



   



   



   



   



   



   



   



   



   



   



   



   ** END OF REPORT **



   



   * ML=Testing performed at Main Lab



   DEPARTMENT OF PATHOLOGY,  [address] [place] [address]



   Phone # [phone]      Fax #[phone]



   Onel Hernandez M.D. Director     [place] Permit  #[id number]

 

 41  HDL Interpretation:



   Undesirable: High Risk:  Less than 40 mg/dL



   Desirable:  Low Risk:  Greater than 60 mg/dL

 

 42  LDL Interpretation:



   Low Risk Optimal Level:  LDL Less than 100 mg/dL



   Near or Above Optimal:  -129 mg/dL



   Borderline High Risk:  -159 mg/dL



   High Risk:  -189 mg/dL



   Very High Risk:  LDL Greater than 189 mg/dL

 

 43  *******Because ethnic data is not always readily available,



   this report includes an eGFR for both -Americans and



   non- Americans.****



   The National Kidney Disease Education Program (NKDEP) does



   not endorse the use of the MDRD equation for patients that



   are not between the ages of 18 and 70, are pregnant, have



   extremes of body size, muscle mass, or nutritional status,



   or are non- or non-.



   According to the National Kidney Foundation, irrespective of



   diagnosis, the stage of the disease is based on the level of



   kidney function:



   Stage Description                      GFR(mL/min/1.73 m(2))



   1     Kidney damage with normal or decreased GFR       90



   2     Kidney damage with mild decrease in GFR          60-89



   3     Moderate decrease in GFR                         30-59



   4     Severe decrease in GFR                           15-29



   5     Kidney failure                       <15 (or dialysis)

 

 44  *******Because ethnic data is not always readily available,



   this report includes an eGFR for both -Americans and



   non- Americans.****



   The National Kidney Disease Education Program (NKDEP) does



   not endorse the use of the MDRD equation for patients that



   are not between the ages of 18 and 70, are pregnant, have



   extremes of body size, muscle mass, or nutritional status,



   or are non- or non-.



   According to the National Kidney Foundation, irrespective of



   diagnosis, the stage of the disease is based on the level of



   kidney function:



   Stage Description                      GFR(mL/min/1.73 m(2))



   1     Kidney damage with normal or decreased GFR       90



   2     Kidney damage with mild decrease in GFR          60-89



   3     Moderate decrease in GFR                         30-59



   4     Severe decrease in GFR                           15-29



   5     Kidney failure                       <15 (or dialysis)

 

 45  PT IS FASTING

 

 46  PT IS FASTING

 

 47  PT IS FASTING

 

 48  PT IS FASTING

 

 49  Desirable:  Less than 200 MG/DL



   Borderline-High Risk:  200-239 MG/DL



   High-Risk:  240 MG/DL and over

 

 50  HDL Interpretation:



   Undesirable: High Risk:  Less than 40 MG/DL



   Desirable:  Low Risk:  Greater than 60 MG/DL

 

 51  Anion gap measurement may be of limited value in the



   presence of any alkalosis, especially in a combined acid



   base disorder.



   .

 

 52  A metabolite of Naproxen, O-desmethylnaproxen, has been



   shown to interfere with the Jendrassik-Leonidas method for



   measuring total bilirubin.  Samples from patients who have



   taken Naproxen have shown spurious elevation in total



   bilirubin levels.

 

 53  *******Because ethnic data is not always readily available,



   this report includes an eGFR for both -Americans and



   non- Americans.****



   The National Kidney Disease Education Program (NKDEP) does



   not endorse the use of the MDRD equation for patients that



   are not between the ages of 18 and 70, are pregnant, have



   extremes of body size, muscle mass, or nutritional status,



   or are non- or non-.



   According to the National Kidney Foundation, irrespective of



   diagnosis, the stage of the disease is based on the level of



   kidney function:



   Stage Description                      GFR(mL/min/1.73 m(2))



   1     Kidney damage with normal or decreased GFR       90



   2     Kidney damage with mild decrease in GFR          60-89



   3     Moderate decrease in GFR                         30-59



   4     Severe decrease in GFR                           15-29



   5     Kidney failure                       <15 (or dialysis)

 

 54  CHOLESTEROL INTERPRETATION:



   Desirable:  Less than 200 MG/DL



   Borderline-High Risk:  200-239 MG/DL



   High-Risk:  240 MG/DL and over

 

 55  HDL INTERPRETATION:



   Undesirable: High Risk:  Less than 40 MG/DL



   Desirable:  Low Risk:  Greater than 60 MG/DL

 

 56  LDL INTERPRETATION:



   Low Risk Optimal Level:  LDL Less than 100 MG/DL



   Near or Above Optimal:  -129 MG/DL



   Borderline High Risk:  -159 MG/DL



   High Risk:  -189 MG/DL



   Very High Risk:  LDL Greater than 189 MG/DL

 

 57  CHOLESTEROL INTERPRETATION:



   Desirable:  Less than 200 MG/DL



   Borderline-High Risk:  200-239 MG/DL



   High-Risk:  240 MG/DL and over

 

 58  HDL INTERPRETATION:



   Undesirable: High Risk:  Less than 40 MG/DL



   Desirable:  Low Risk:  Greater than 60 MG/DL

 

 59  LDL INTERPRETATION:



   Low Risk Optimal Level:  LDL Less than 100 MG/DL



   Near or Above Optimal:  -129 MG/DL



   Borderline High Risk:  -159 MG/DL



   High Risk:  -189 MG/DL



   Very High Risk:  LDL Greater than 189 MG/DL

 

 60  Anion gap measurement may be of limited value in the



   presence of any alkalosis, especially in a combined acid



   base disorder.



   .

 

 61  A metabolite of Naproxen, O-desmethylnaproxen, has been



   shown to interfere with the Jendrassik-Leonidas method for



   measuring total bilirubin.  Samples from patients who have



   taken Naproxen have shown spurious elevation in total



   bilirubin levels.

 

 62  *******Because ethnic data is not always readily available,



   this report includes an eGFR for both -Americans and



   non- Americans.****



   The National Kidney Disease Education Program (NKDEP) does



   not endorse the use of the MDRD equation for patients that



   are not between the ages of 18 and 70, are pregnant, have



   extremes of body size, muscle mass, or nutritional status,



   or are non- or non-.



   According to the National Kidney Foundation, irrespective of



   diagnosis, the stage of the disease is based on the level of



   kidney function:



   Stage Description                      GFR(mL/min/1.73 m(2))



   1     Kidney damage with normal or decreased GFR       90



   2     Kidney damage with mild decrease in GFR          60-89



   3     Moderate decrease in GFR                         30-59



   4     Severe decrease in GFR                           15-29



   5     Kidney failure                       <15 (or dialysis)

 

 63  Lymphopenia %

 

 64  Anion gap measurement may be of limited value in the



   presence of any alkalosis, especially in a combined acid



   base disorder.



   .

 

 65  A metabolite of Naproxen, O-desmethylnaproxen, has been



   shown to interfere with the Jendrassik-Leonidas method for



   measuring total bilirubin.  Samples from patients who have



   taken Naproxen have shown spurious elevation in total



   bilirubin levels.

 

 66  *******Because ethnic data is not always readily available,



   this report includes an eGFR for both -Americans and



   non- Americans.****



   The National Kidney Disease Education Program (NKDEP) does



   not endorse the use of the MDRD equation for patients that



   are not between the ages of 18 and 70, are pregnant, have



   extremes of body size, muscle mass, or nutritional status,



   or are non- or non-.



   According to the National Kidney Foundation, irrespective of



   diagnosis, the stage of the disease is based on the level of



   kidney function:



   Stage Description                      GFR(mL/min/1.73 m(2))



   1     Kidney damage with normal or decreased GFR       90



   2     Kidney damage with mild decrease in GFR          60-89



   3     Moderate decrease in GFR                         30-59



   4     Severe decrease in GFR                           15-29



   5     Kidney failure                       <15 (or dialysis)

 

 67  FINAL: NO GROWTH DAY 2 (<1,000 CFU/mL)

 

 68  PLEASE NOTE NEW REFERENCE RANGE.

 

 69  Lymphopenia %

 

 70  Anion gap measurement may be of limited value in the



   presence of any alkalosis, especially in a combined acid



   base disorder.



   .

 

 71  A metabolite of Naproxen, O-desmethylnaproxen, has been



   shown to interfere with the Jendrassik-Leonidas method for



   measuring total bilirubin.  Samples from patients who have



   taken Naproxen have shown spurious elevation in total



   bilirubin levels.

 

 72  *******Because ethnic data is not always readily available,



   this report includes an eGFR for both -Americans and



   non- Americans.****



   The National Kidney Disease Education Program (NKDEP) does



   not endorse the use of the MDRD equation for patients that



   are not between the ages of 18 and 70, are pregnant, have



   extremes of body size, muscle mass, or nutritional status,



   or are non- or non-.



   According to the National Kidney Foundation, irrespective of



   diagnosis, the stage of the disease is based on the level of



   kidney function:



   Stage Description                      GFR(mL/min/1.73 m(2))



   1     Kidney damage with normal or decreased GFR       90



   2     Kidney damage with mild decrease in GFR          60-89



   3     Moderate decrease in GFR                         30-59



   4     Severe decrease in GFR                           15-29



   5     Kidney failure                       <15 (or dialysis)

 

 73  Anion gap measurement may be of limited value in the



   presence of any alkalosis, especially in a combined acid



   base disorder.



   .

 

 74  ** Note change in reference range as of 08.  The



   change was based on recommendations from the American



   Diabetes Association.

 

 75  Please note change in reference range effective 08



   .

 

 76  A metabolite of Naproxen, O-desmethylnaproxen, has been



   shown to interfere with the Jendrassik-Anali method for



   measuring total bilirubin.  Samples from patients who have



   taken Naproxen have shown spurious elevation in total



   bilirubin levels.

 

 77  *******Because ethnic data is not always readily available,



   this report includes an eGFR for both -Americans and



   non- Americans.****



   The National Kidney Disease Education Program (NKDEP) does



   not endorse the use of the MDRD equation for patients that



   are not between the ages of 18 and 70, are pregnant, have



   extremes of body size, muscle mass, or nutritional status,



   or are non- or non-.



   According to the National Kidney Foundation, irrespective of



   diagnosis, the stage of the disease is based on the level of



   kidney function:



   Stage Description                      GFR(mL/min/1.73 m(2))



   1     Kidney damage with normal or decreased GFR       90



   2     Kidney damage with mild decrease in GFR          60-89



   3     Moderate decrease in GFR                         30-59



   4     Severe decrease in GFR                           15-29



   5     Kidney failure                       <15 (or dialysis)

 

 78  CHOLESTEROL INTERPRETATION:



   Desirable:  Less than 200 MG/DL



   Borderline-High Risk:  200-239 MG/DL



   High-Risk:  240 MG/DL and over

 

 79  HDL INTERPRETATION:



   Undesirable: High Risk:  Less than 40 MG/DL



   Desirable:  Low Risk:  Greater than 60 MG/DL

 

 80  LDL INTERPRETATION:



   Low Risk Optimal Level:  LDL Less than 100 MG/DL



   Near or Above Optimal:  -129 MG/DL



   Borderline High Risk:  -159 MG/DL



   High Risk:  -189 MG/DL



   Very High Risk:  LDL Greater than 189 MG/DL







Procedures







 Date  Code  Description  Status

 

 2019  54099  EKG Tracing & Interpretation  Completed

 

 2019  97913  ECHO Transthoracic, Real-Time 2D With Doppler And  Completed



     Color Flow  

 

 2019  14907  ECHO Transthoracic, Real-Time 2D With Doppler And  Completed



     Color Flow  

 

 2019  08616  Inj/Aspir Major JT Or Bursa W/ US  Completed

 

 2018  [id number]  Diabetic Retinal Eye Exam  Completed

 

 04/10/2018  63332  ECHO Transthoracic, Real-Time 2D With Doppler And  Completed



     Color Flow  

 

 04/10/2018  70865  ECHO Transthoracic, Real-Time 2D With Doppler And  Completed



     Color Flow  

 

 2017  47087  Destruction Of Benign Lesions Any Method 1-14 lesions  
Completed

 

 2017  20998  Destruction Of Benign Lesions Any Method 1-14 lesions  
Completed

 

 2017  149464723  Bone Mineral Density Test  Completed

 

 2017  [id number]  Diabetic Retinal Eye Exam  Completed

 

 2016  72555  Stress Test  Completed

 

 2016  94799  Myocardial Perfusion Imaging Tomographic (Spect)  Completed



     Multiple Studies  

 

 2016  19485  Myocardial Perfusion Imaging Tomographic (Spect)  Completed



     Multiple Studies  

 

 2015  20034  EKG Tracing & Interpretation  Completed

 

 2015  [id number]  Mammogram  Completed

 

 2015  979667705  Bone Mineral Density Test  Completed

 

 2014  32610  ECHO Transthoracic, Real-Time 2D With Doppler And  Completed



     Color Flow  

 

 10/24/2013  [id number]  Mammogram  Completed

 

 10/23/2012  [id number]  Bone Mineral Density Test  Completed

 

 10/23/2012  [id number]  Mammogram  Completed

 

 10/18/2011  [id number]  Mammogram  Completed

 

 10/04/2011  24747  Treadmill Interp/Report Only  Completed

 

 10/04/2011  30936  Stress Test Supervsn W/Out I/R  Completed

 

 2011  34407  EKG Tracing & Interpretation  Completed

 

 2011  52450  Noninvasive Ear Or Pulse Oximetry For Oxygen  Completed



     Saturation  

 

 2011  05520  ECHO Transthoracic, Real-Time 2D With Doppler And  Completed



     Color Flow  

 

 2010  [id number]  Mammogram  Completed

 

 2009  684127794  Bone Mineral Density Test  Completed

 

 2009  38165  EKG Tracing & Interpretation  Completed

 

 2008  18186  EKG Tracing & Interpretation  Completed

 

 2008  33457  EKG Tracing & Interpretation  Completed

 

 2001  [id number]  Colonoscopy  Completed







Encounters







 Type  Date  Location  Provider  Dx  Diagnosis

 

 Office Visit  2019  Orthopedic  Radha Collado,  M25.552  Pain in left hip



   10:30a  [facility]    









 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









 Office Visit  2019  2:40p  [facility]  [place]  I10  Essential (
primary)



     Medicine -  LAURA Carlin    hypertension



     [place]      









 M25.552  Pain in left hip

 

 I35.0  Nonrheumatic aortic (valve) stenosis









 Office Visit  2019  1:30p  [facility]  Radha Collado,  M25.552  
Pain in left



     [facility]  M.D.    hip









 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









 Office Visit  2018  9:00a  [facility]  [place] Tesfaye,  M25.552  
Pain in left



     Medicine  M.D.    hip









 I10  Essential (primary) hypertension









 Office Visit  2018  3:00p  [facility]  [place]  Z00.00  Encntr for



     Janet Carlin M.D.    general adult



           medical exam



           w/o abnormal



           findings









 M54.9  Dorsalgia, unspecified









 Office Visit  07/10/2018 10:20a  [facility]  Mike Vicente MD  L72.0  
Epidermal cyst









 L82.1  Other seborrheic keratosis

 

 Z08  Encntr for follow-up exam after trtmt for malignant neoplasm

 

 Z85.828  Personal history of other malignant neoplasm of skin









 Office Visit  2018  9:40a  [facility]  [place]  M54.9  Dorsalgia,



     Janet Carlin M.D.    unspecified









 M25.552  Pain in left hip

 

 I10  Essential (primary) hypertension

 

 E78.5  Hyperlipidemia, unspecified









 Office Visit  2018 10:20a  [facility]  [place]  I10  Essential (
primary)



     Janet Carlin M.D.    hypertension









 J06.9  Acute upper respiratory infection, unspecified

 

 I34.0  Nonrheumatic mitral (valve) insufficiency









 Office Visit  2017 10:30a  [facility]  Mike Vicente,  L82.1  Other 
seborrheic



       MD    keratosis









 I78.8  Other diseases of capillaries

 

 R60.0  Localized edema

 

 B07.0  Plantar wart

 

 Z78.9  Other specified health status

 

 R20.8  Other disturbances of skin sensation

 

 S90.921A  Unspecified superficial injury of right foot, init encntr









 Office Visit  2017 10:00a  [facility]  [place]  I10  Essential (
primary)



     Janet Carlin M.D.    hypertension









 K62.5  Hemorrhage of anus and rectum

 

 R35.0  Frequency of micturition

 

 K64.4  Residual hemorrhoidal skin tags









 Office Visit  2016 10:00a  [facility]  [place]  I10  Essential (
primary)



     Janet Carlin M.D.    hypertension









 D23.9  Other benign neoplasm of skin, unspecified

 

 G47.00  Insomnia, unspecified









 Office Visit  10/31/2016 10:40a  [facility]  Donavon Araiza NP  J01.00  Acute 
maxillary



     Medicine      sinusitis,



           unspecified

 

 Office Visit  2016  4:20p  [facility]  Lisseth Hardy  J01.00  Acute 
maxillary



     Medicine  N.P.    sinusitis,



           unspecified

 

 Office Visit  2016  4:20p  [facility]  Lisseth Hardy,  J01.00  Acute 
maxillary



     Medicine  N.P.    sinusitis,



           unspecified

 

 Office Visit  2016  2:00p  [facility]  Radha Collado,  M16.11  Unilateral



     [facility]  M.D.    primary



     [facility]      osteoarthritis,



           right hip









 M17.0  Bilateral primary osteoarthritis of knee

 

 M25.462  Effusion, left knee

 

 M25.562  Pain in left knee

 

 M25.461  Effusion, right knee

 

 M25.561  Pain in right knee

 

 M54.5  Low back pain









 Office Visit  2016  2:20p  [facility]  Tr[place] Carlin,  M25.562  
Pain in left



     Medicine  M.D.    knee









 M25.561  Pain in right knee

 

 M25.551  Pain in right hip









 Office Visit  2015  9:40a  [facility]  [place]  M85.9  Disorder of 
bone



     Medicine  LAURA Carlin    density and



           structure,



           unspecified









 R06.02  Shortness of breath

 

 K21.9  Gastro-esophageal reflux disease without esophagitis

 

 E78.5  Hyperlipidemia, unspecified









 Office Visit  2015 10:20a  [facility]  Donavon Araiza  R06.02  Shortness 
of



     Medicine  NP    breath









 R07.9  Chest pain, unspecified

 

 R14.2  Eructation









 Office Visit  2015 10:00a  [facility]  Donavon Araiza NP  784.91  
Postnasal Drip



     Medicine      









 466.0  Bronchitis Acute

 

 786.2  Cough









 Office  2015  [facility]  Donavon Araiza NP  466.0  Bronchitis Acute



 Visit  9:00a  Medicine      

 

 Office  2015  [facility]  Lisseth Hardy,  788.43  Nocturia



 Visit  1:00p  Medicine  N.P.    

 

 Office  2014  [facility]  [place]  272.0  Hypercholesterolemia Pure



 Visit  10:00a  Janet Carlin M.D.    









 719.44  Pain Joint Hand

 

 424.0  Mitral Valve Disorder









 Office Visit  2014  1:00p  [facility]  [place]  V70.0  Examination



     Medicine  LAURA Carlin    General Medical



           Routine AT Health



           Care Facility









 401.1  Hypertension Benign

 

 272.0  Hypercholesterolemia Pure

 

 437.8  Cerebrovascular Disease Other

 

 424.1  Aortic Valve Disorder









 Office Visit  2014 11:40a  [facility]  Lisseth Hardy,  724.5  Backache 
Unspec



     Medicine  N.P.    

 

 Office Visit  10/17/2013  1:00p  [facility]  [place]  401.1  Hypertension



     Janet Carlin M.D.    Benign









 780.52  Insomnia Unspecified

 

 V04.81  Need For Prophylactic Vaccination & Inoculation/Influenza









 Office Visit  2013  4:20p  [facility]  Yoanna Cohen,  789.04  Pain 
Abdominal



     Medicine  M.D., FACP    Left Lower



           Qu[place]rant

 

 Office Visit  2013  2:40p  [facility]  [place]  V70.0  Examination



     Medicine  LAURA Carlin    General Medical



           Routine AT Health



           Care Facility









 272.0  Hypercholesterolemia Pure

 

 401.1  Hypertension Benign

 

 V76.10  Screening For Malignant Neoplasm Breast









 Office Visit  2012  9:30a  [facility]  Nurse Visit A  401.1  
Hypertension



     Medicine      Benign

 

 Office Visit  10/16/2012 10:00a  [facility]  [place]  401.1  Hypertension



     Janet Carlin M.D.    Benign









 782.0  Skin Sensation Disturbance

 

 272.0  Hypercholesterolemia Pure









 Office Visit  2012 10:20a  [facility]  [place]  V70.0  Examination



     Medicine  LAURA Carlin    General Medical



           Routine AT Health



           Care Facility









 272.0  Hypercholesterolemia Pure

 

 724.2  Lumbago

 

 V76.10  Screening For Malignant Neoplasm Breast

 

 V82.81  Special Screening For Osteoporosis

 

 V12.50  History Personal Circulatory Diseases Unspec









 Office Visit  2012 11:20a  [facility]  [place]  729.5  Pain In Limb



     Medicine  LAURA Cariln    

 

 Office Visit  10/04/2011 10:30a  [facility]  Steve GRACE  786.50  Pain 
Chest



       LAURA Alonso    Unspec









 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

 

 401.1  Hypertension Benign









 Office Visit  2011  DO Not Use  [place]  786.05  Shortness Of



   1:20p  Denise Carlin M.D.    Breath









 v04.81  Need For Prophylactic Vaccination & Inoculation/Influenza









 Office Visit  2011  DO Not Use  [place]  401.1  Hypertension



   10:20a  [facility]  Cotton, M.D.    Benign









 272.0  Hypercholesterolemia Pure

 

 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

 

 V76.10  Screening For Malignant Neoplasm Breast









 Office Visit  2011  DO Not Use  Lisseth Antony,  680.8  Carbuncle &



   11:00a  [facility]  N.P.    Furuncle Spec



           Sites Other

 

 Office Visit  2011  DO Not Use  [place]  401.1  Hypertension



   10:30a  Denise Carlin M.D.    Benign

 

 Office Visit  2011  DO Not Use  [place]  789.00  Pain Abdominal



   11:30a  Denise Carlin M.D.    Unspec Site

 

 Office Visit  2010  DO Not Use  [place]  V70.0  Examination



   10:45a  Denise Carlin M.D.    General Medical



           Routine AT Health



           Care Facility









 401.1  Hypertension Benign

 

 272.0  Hypercholesterolemia Pure

 

 569.49  Rectum & Anus Disorder Other









 Office Visit  2010  DO Not Use  [place],  465.9  URI  Upper



   2:45p  Denise Appiah M.D.    Respiratory



           Infections Acute



           Unspec Sites









 786.2  Cough

 

 401.1  Hypertension Benign









 Office Visit  2009  DO Not Use  Lisseth Antony,  466.0  Bronchitis Acute



   2:45p  [facility]  [facility]    

 

 Office Visit  2009  DO Not Use  [place],  V70.0  Examination



   2:30p  Denise Appiah M.D.    General Medical



           Routine AT Health



           Care Facility









 729.5  Pain In Limb

 

 401.1  Hypertension Benign

 

 272.0  Hypercholesterolemia Pure

 

 443.9  Peripheral Vascular Disease Unspec

 

 V04.81  Need For Prophylactic Vaccination & Inoculation/Influenza









 Office Visit  2009  DO Not Use  Lisseth Antony,  727.43  Ganglion Unspec



   4:15p  [facility]  N.P.    

 

 Office Visit  2009  DO Not Use  [place],  443.9  Peripheral



   2:45p  Denise Appiah M.D.    Vascular Disease



           Unspec









 715.94  Osteoarthrosis Unspec Genlzd Or Localized Hand

 

 401.1  Hypertension Benign









 Office Visit  2009  DO Not Use  [place],  V72.84  Examination



   3:15p  Denise Appiah M.D.    Preoperative



           Unspec









 443.9  Peripheral Vascular Disease Unspec

 

 401.1  Hypertension Benign









 Office  2008  DO Not Use  [place],  272.0  Hypercholesterolemia



 Visit  2:45p  Denise Appiah M.D.    Pure









 443.9  Peripheral Vascular Disease Unspec

 

 311  Depressive Disorder Not Elsewhere Spec









 Office Visit  2008  DO Not Use  [place],  443.9  Peripheral



   10:45a  Denise Appiah M.D.    Vascular Disease



           Unspec









 780.79  Malaise And Fatigue Other

 

 401.1  Hypertension Benign

 

 311  Depressive Disorder Not Elsewhere Spec

 

 V70.0  Examination General Medical Routine AT Health Care Facility









 Office Visit  2008  DO Not Use  [place],  272.4  Hyperlipidemia



   2:45p  Denise Appiah M.D.    Other Unspec









 401.1  Hypertension Benign









 Office  2008  DO Not Use  [place],  272.0  Hypercholesterolemia



 Visit  2:30p  Denise Appiah M.D.    Pure









 443.9  Peripheral Vascular Disease Unspec

 

 401.1  Hypertension Benign









 Office Visit  2007  DO Not Use  [place],  401.1  Hypertension



   2:45p  Denise Appiah M.D.    Benign









 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

 

 272.0  Hypercholesterolemia Pure

 

 V70.0  Examination General Medical Routine AT Health Care Facility









 Office Visit  2007  DO Not Use  [place],  401.1  Hypertension



   2:45p  Denise Appiah M.D.    Benign









 528.9  Oral Soft Tissue Diseases Other & Unspec

 

 272.0  Hypercholesterolemia Pure









 Office Visit  2006  DO Not Use  [place],  V72.31  Routine Gyn



   2:15p  Denise Appiah M.D.    Examination







Plan of Treatment

Future Appointment(s):2019  8:40 am - Tracy Carlin M.D. at [facility] 
[facility] Tfjrwftx14/29/2019  9:00 am - Radha Collado [facility]  1:30 pm - [facility]ha [facility] M[facility]  3:00 pm - Tracy Carlin M.D. at [facility] 
Whzwzstg53/20/2019 - Amador Diaz DO [id number] Encounter for 
preprocedural cardiovascular examinationFollow up:PRN

## 2019-06-11 NOTE — XMS REPORT
Continuity of Care Document (CCD)

 Created on:May 29, 2019



Patient:Shiela Sheets

Sex:Female

:1930

External Reference #:[id number]





Demographics







 Address  [address] [place] [address]

 

 Home Phone  [phone]

 

 Email Address  [email or web address]

 

 Preferred Language  en

 

 Marital Status  Not  or 

 

 Catholic Affiliation  Unknown

 

 Race  White

 

 Ethnic Group  Not  or 









Author







 Name  Herlinda Kaur









Support







 Name  Relationship  Address  Phone

 

 Frank Sheets  Unavailable  Unavailable  [phone]

 

 Love Sheets  Unavailable  Unavailable  [phone]









Care Team Providers







 Name  Role  Phone

 

 Tracy Carlin MD  Primary Care Physician  Unavailable









Payers







 Date  Identification Numbers  Payment Provider  Subscriber

 

 Effective: 2013  Policy Number: [id number]  Medicare Blue Ppo  
Shiela BLAKE Alejandro









 Group Number: [id number]  [address] [address]

 

 PayID:   [place]IF [place] [id number]









 Expires: 2012  Policy Number: [id number]  Medicare Blue Ppo  Shiela 
R Alejandro









 Group Number: MEDICARE BLUE [id number]  [address] [address]

 

 Group Name: [employer]  [place] [id number]









 PayID: 







Problems







 Active Problems  Provider  Date

 

 Benign essential hypertension  Tracy Carlin M.D.  Onset: 2010

 

 Cerebral artery occlusion  Trayc Carlin M.D.  Onset: 2010

 

 Localized, primary osteoarthritis  Radha Collado M.D.  Onset: 2016

 

 Localized, primary osteoarthritis of the  Radha Collado M.D.  Onset: 2016



 pelvic region and thigh    







Family History







 Date  Family Member(s)  Observation  Comments

 

   General  Heart Disease  

 

   General  Diabetes  

 

   General  Cancer  

 

   General  Alzheimer's Disease  

 

 :  (age 85  Father   due to Cancer,  



 Years)    Lung  

 

 :  (age 93  Mother   due to  



 Years)    Alzheimer's Disease  

 

   First Brother   due to Heart  ()



     Disease  

 

   Second Brother   due to Melanoma  () - CAD

 

   Third Brother   due to  () -  of



     Alzheimer's Disease  pneumonia

 

   First Sister  Breast Cancer  

 

 :  (age 85  Second Sister   due to COPD  



 Years)      

 

   Third Sister  Alive And Well  







Social History







 Type  Date  Description  Comments

 

 Birth Sex    Unknown  

 

 Marital Status    Single  First  killed



       in , remarried



       but  in 

 

 Lives With    Alone  Drives without



       problems. Worked at



       [employer] quit in



       . Volunteers at



       the [employer]

 

 Occupation    Book keeper  

 

 Advance Directive    Health Care Proxy  son: Frank Sheets

 

 Tobacco Use  Start: Unknown  Never Smoked Cigarettes  

 

 ETOH Use    Denies alcohol use  

 

 Tobacco Use  Start: Unknown  Patient has never  



     smoked  

 

 Recreational Drug Use    Denies Drug Use  

 

 Smoking Status  Reviewed: 19  Patient has never  



     smoked  

 

 Exercise Type/Frequency    Exercises sporadically  







Allergies, Adverse Reactions, Alerts







 Active Allergies  Reaction  Severity  Comments  Date

 

 Penicillin  HIVES  Severe    2010

 

 Sulfa  CHEST PAIN  Severe    2010

 

 Keflex  RASH  Severe    2010

 

 Lisinopril  cough      10/16/2012

 

 Metaxalone  dizzy      2014

 

 Latex/Tape        2019







Medications







 Active Medications  SIG  Qnty  Indications  Ordering Provider  Date

 

 Vit B12  3 x wk      Tracy Carlin,  2013



         M.DNavya  

 

 Diltiazem HCL ER  take one capsule  90caps  R06.02  Tracy Carlin,  10/16/
2012



 Coated Beads  by mouth once      M.D.  



             120mg  daily        



 Caps ER 24HR          



           

 

 Crestor  Take One Tablet  90tabs    Tracy Carlin,  2012



        40mg Tablets  By Mouth Once      M.D.  



   Daily        

 

 Aspirin  1 by mouth once      Tracy Carlin,  2010



        81mg Tablets  daily      M.D.  



 DR          

 

 Caltrate 600+D  1 by mouth once      Tracy Carlin,  2010



   daily      M.DNavya  



 003-440cm-Isig          



 Tablets          



           

 

 Proctosol HC  Use as directed  28.350gm    Tracy Carlin,  2010



             2.5%        M.DNavya  



 Cream          



           

 

 Eq Fiber Therapy  2 in in the      Unknown  



   morning        



 500mg Tablets          



           

 

 Tylenol 8 Hour  1 tab every 6      Unknown  



               650mg  hours as needed        



 Tablets ER  for pain        



           

 

 Eylea  prn      Unknown  



      2mg/0.05ML          



 Solution          



           

 

 Eye Vitamins  daily      Unknown  



           



 Capsules          



           

 

 One Daily For Women  1 po qd      Unknown  



           



 Tablets          



           

 

 Travatan  each eye nightly      Unknown  



         0.004%          



 Solution          



           









 History Medications









 Tramadol HCL  1-2 tablets every  42tabs  M54.9  [facility]  2018 -



             50mg  6 hours as needed      LAURA Carlin  2019



 Tablets          



           

 

 Doxycycline Hyclate  one tablet twice  20caps  J01.00  Donavon Araiza NP  10/31/
2016 -



   daily for 10        2016



 100mg Capsules  days.        



           

 

 Fluticasone  2 sprays each  16units  J01.00  Donavon Araiza NP  10/31/2016 -



 Propionate  nostril daily as        2019



           50mcg/Act  needed-not using        



 Suspension          



           

 

 Benzonatate  take one or two  30caps  J01.00  Donavon Araiza NP  10/31/2016 -



            100mg  capsules every 8        2016



 Capsules  hours as needed        



   for cough.        

 

 Levaquin  1 by mouth daily  10tabs  J01.00  Lisseth Hardy,  2016 -



         500mg  for 10 days      N.P.  2016



 Tablets          



           

 

 Fluticasone  2 sprays each  16units  J01.00  Lisseth Hardy,  2016 -



 Propionate  nostril daily as      N.P.  2016



           50mcg/Act  needed        



 Suspension          



           

 

 Azithromycin  two tabs day one,  6tabs  J01.00  Lisseth Hardy,  2016 -



             250mg  one daily till      N.P.  2016



 Tablets  gone        



           

 

 Meloxicam  1 by mouth every  60tabs  M16.11  Radha Collado,  2016 -



          15mg  day      M.DNavya  2017



 Tablets          



           

 

 Lorazepam  1-2 tabs by mouth  5tabs    Tracy  2016 -



          0.5mg  prior to      LAURA Carlin  04/10/2016



 Tablets  procedure        



           

 

 Omeprazole  1 by mouth as  30caps  R14.2  Donavon Araiza NP  2015 -



           20mg  needed        2016



 Capsules DR Andrew  1 by mouth every  30tabs    Lisseth Hardy,  2015 -



         10mg Tablets  day      N.P.  2015



           

 

 Ditropan XL  1 by mouth every  30tabs  788.43  Lisseth Hardy,  2015 -



            10mg  day      N.P.  2015



 Tablets ER 24HR          



           

 

 Metaxalone  take 1 tablet 3  30tabs  724.5  Lisseth Hardy,  2014 -



           800mg  times a day as      N.P.  2014



 Tablets  needed        



           

 

 Tizanidine HCL  One po q 4 hours  30caps    Lisseth Antony,  2014 -



               4mg  prn      N.P.  2014



 Capsules          



           

 

 Zolpidem Tartrate  1/2-1 po tablet  30tabs  780.52  Tracy  10/17/2013 -



                  5mg  at bedtime as      LAURA Carlin  2014



 Tablets  needed        



           

 

 Zostavax  1 dose s/c  1units    [facility]  2012 -



          Solution        LAURA Carlin  10/15/2012



 Rec          

 

 Diltiazem HCL ER  Take One Capsule  30caps  786.05  [facility]  2012 -



   By Mouth Once      LAURA Carlin  10/16/2012



 180mg Caps ER 24HR  Daily        



           

 

 Atorvastatin Calcium  1 po qd  90tabs    [facility]  2012 -



         LAURA Carlin  2012



 40mg Tablets          



           

 

 Diltiazem CD  1 PO qd  30caps  786.05  [facility]  2011 -



             180mg        LAURA Carlin  2012



 Caps ER 24HR          



           

 

 Lisinopril  1 tablet once  30tabs  786.05  Tracy   -



           10mg  daily      LAURA Carlin  2011



 Tablets          



           

 

 Cipro  1 tablet twice a  20tabs    Unknown   -



      500mg Tablets  day        2011



           

 

 Crestor  Take One Tablet  30tabs    Tracy   -



        40mg Tablets  By Mouth AT      LAURA Carlin  2012



   Bedtime        

 

 Aleve  1 po prn  60caps    Unknown   -



      220mg Capsules          10/16/2012



           

 

 Flonase Allergy  2 sprays each      Unknown   -



 Relief  nostril once        2015



       50mcg/Act  daily        



 Suspension          



           







Medications Administered in Office







 Medication  SIG  Qnty  Indications  Ordering Provider  Date

 

 Depomedrol 40MG        Radha Collado M.D.  2019



         Injection          



           

 

 Inj, Regadenoson, 0.1 MG        Amador Diaz DO [facility]  2016



                  Injection          



           

 

 Inj, Regadenoson, 0.1 MG        GLORIA Peralta  2016



                  Injection          



           

 

 Technetium TC 99M        Amador Diaz DO [facility]  2016



 Tetrofosmin, Per Unit Dose          



 Up To 40 Millicuries          



              Injection          



           

 

 Technetium TC 99M        GLORIA Peralta  2016



 Tetrofosmin, Per Unit Dose          



 Up To 40 Millicuries          



              Injection          



           







Immunizations







 CPT Code  Status  Date  Vaccine  Lot #

 

 [id number]  Given  10/02/2018  Influenza Virus Vaccine, Quadrivalent, Split,  



       Preservative Free  

 

 40221  Given  2017  Influenza Virus Vaccine, Quadrivalent, Split,  [id number]



       Preservative Free  

 

 14013  Given  10/14/2016  Influ Virus Vaccine, Quadrivalent, Split Virus,  
[id number]



       Im Fluzone not PF  

 

 83020  Given  10/10/2015  Influenza Virus Vaccine, Quadrivalent, Split,  [id number]



       Preservative Free  

 

 01770  Given  2015  Pneumococcal Conjugate Vaccine 13 Valent For  [id number]



       Intramuscular Use  

 

 07558  Given  10/14/2014  Influenza Virus Vaccine, Quadrivalent, Split,  [id number]



       Preservative Free  

 

 25746  Given  10/17/2013  Flu Vaccine Split Virus Preservative Free For  
[id number]



       Indiv 3Yr Older  

 

   Given  2012  Fluzone Vaccine  

 

 08723  Given  2012  Zoster (Zostavax)  

 

   Given  2011  Afluria Vaccine  [id number]

 

 75334  Given  2010  Influenza Virus 3Yrs & Over  [id number]

 

 67713  Given  2009  Influenza Virus 3Yrs & Over  

 

 78465  Given  10/22/2008  Influenza Virus 3Yrs & Over  

 

 38717  Given  10/22/2008  Influenza Virus 3Yrs & Over  

 

 80223  Given  10/25/2007  Influenza Virus 3Yrs & Over  







Vital Signs







 Date  Vital  Result  Comment

 

 2019  9:00am  Height  63 inches  5'3"









 Weight  144.00 lb  

 

 Heart Rate  73 /min  

 

 BP Systolic  116 mmHg  

 

 BP Diastolic  57 mmHg  

 

 Body Temperature  97.7 F  

 

 BMI (Body Mass Index)  25.5 kg/m2  









 2019  2:55pm  Height  63 inches  5'3"









 Weight  144.00 lb  with shoes

 

 Heart Rate  70 /min  

 

 BP Systolic  140 mmHg  Rue reg cuff

 

 BP Diastolic  60 mmHg  Rue reg cuff

 

 BP Systolic Sitting  134 mmHg  Lue reg cuff

 

 BP Diastolic Sitting  62 mmHg  Lue reg cuff

 

 BP Systolic Standing  132 mmHg  Lue reg cuff

 

 BP Diastolic Standing  60 mmHg  Lue reg cuff

 

 Respiratory Rate  15 /min  

 

 BMI (Body Mass Index)  25.5 kg/m2  

 

 Ejection Fraction  65-70%  date 19 ECHO









 2019 11:14am  Height  63 inches  5'3"









 Heart Rate  66 /min  

 

 BP Systolic Sitting  150 mmHg  

 

 BP Diastolic Sitting  66 mmHg  

 

 Pain Level  5  

 

 O2 % BldC Oximetry  95 %  









 2019  2:47pm  Height  63 inches  5'3"









 Weight  145.00 lb  

 

 BP Systolic  132 mmHg  

 

 BP Diastolic  60 mmHg  

 

 Body Temperature  98.2 F  

 

 Pain Level  6  

 

 BMI (Body Mass Index)  25.7 kg/m2  









 2019  2:38pm  Height  62.5 inches  5'2.50"









 Weight  142.38 lb  

 

 Heart Rate  76 /min  

 

 BP Systolic  132 mmHg  

 

 BP Diastolic  65 mmHg  

 

 Body Temperature  97.9 F  

 

 O2 % BldC Oximetry  97 %  

 

 BMI (Body Mass Index)  25.6 kg/m2  









 2019  1:40pm  Height  62.5 inches  5'2.50"









 Weight  145.00 lb  

 

 Heart Rate  76 /min  

 

 BP Systolic  130 mmHg  

 

 BP Diastolic  76 mmHg  

 

 BMI (Body Mass Index)  26.1 kg/m2  









 2018  8:52am  Height  63 inches  5'3"









 Weight  143.00 lb  

 

 Heart Rate  80 /min  

 

 BP Systolic  124 mmHg  

 

 BP Diastolic  70 mmHg  

 

 BP Systolic Sitting  147 mmHg  

 

 BP Diastolic Sitting  70 mmHg  

 

 O2 % BldC Oximetry  98 %  

 

 BMI (Body Mass Index)  25.3 kg/m2  









 2018  3:36pm  Height  63 inches  5'3"









 Weight  143.00 lb  

 

 Heart Rate  67 /min  

 

 BP Systolic Sitting  135 mmHg  

 

 BP Diastolic Sitting  67 mmHg  

 

 O2 % BldC Oximetry  96 %  

 

 BMI (Body Mass Index)  25.3 kg/m2  









 2018  9:42am  Height  63 inches  5'3"









 Weight  145.00 lb  

 

 Heart Rate  73 /min  

 

 BP Systolic Sitting  128 mmHg  

 

 BP Diastolic Sitting  65 mmHg  

 

 Body Temperature  98.1 F  

 

 O2 % BldC Oximetry  97 %  

 

 BMI (Body Mass Index)  25.7 kg/m2  









 2018 10:33am  Weight  146.25 lb  









 Heart Rate  81 /min  

 

 BP Systolic Sitting  110 mmHg  

 

 BP Diastolic Sitting  68 mmHg  

 

 Body Temperature  99.7 F  

 

 O2 % BldC Oximetry  95 %  









 2017 10:49am  Height  63 inches  5'3"









 Weight  146.50 lb  

 

 Heart Rate  68 /min  

 

 BP Systolic  140 mmHg  

 

 BP Diastolic  80 mmHg  

 

 Body Temperature  98.1 F  

 

 O2 % BldC Oximetry  97 %  

 

 BMI (Body Mass Index)  25.9 kg/m2  









 2017  9:44am  Weight  145.00 lb  









 Heart Rate  80 /min  

 

 BP Systolic Sitting  140 mmHg  

 

 BP Diastolic Sitting  70 mmHg  

 

 Respiratory Rate  16 /min  

 

 O2 % BldC Oximetry  98 %  









 2016  9:55am  Weight  146.00 lb  









 Heart Rate  70 /min  

 

 BP Systolic  122 mmHg  

 

 BP Diastolic  74 mmHg  

 

 Respiratory Rate  15 /min  

 

 Body Temperature  98.0 F  

 

 O2 % BldC Oximetry  98 %  









 10/31/2016 10:28am  Heart Rate  84 /min  









 BP Systolic Sitting  138 mmHg  

 

 BP Diastolic Sitting  80 mmHg  

 

 Respiratory Rate  14 /min  

 

 Body Temperature  98.1 F  

 

 O2 % BldC Oximetry  98 %  









 2016  4:23pm  Weight  143.00 lb  with shoes









 Heart Rate  68 /min  

 

 BP Systolic Sitting  140 mmHg  

 

 BP Diastolic Sitting  70 mmHg  

 

 Body Temperature  98.2 F  

 

 O2 % BldC Oximetry  98 %  









 2016  2:43pm  Height  62 inches  5'2"









 Weight  146.00 lb  

 

 Heart Rate  76 /min  

 

 BP Systolic Sitting  120 mmHg  

 

 BP Diastolic Sitting  72 mmHg  

 

 Respiratory Rate  15 /min  

 

 Body Temperature  97.8 F  

 

 O2 % BldC Oximetry  97 %  

 

 BMI (Body Mass Index)  26.7 kg/m2  









 2016  4:21pm  Heart Rate  77 /min  









 BP Systolic Sitting  140 mmHg  

 

 BP Diastolic Sitting  70 mmHg  

 

 Body Temperature  98.8 F  

 

 O2 % BldC Oximetry  97 %  









 2016  2:01pm  Height  63 inches  5'3"









 Weight  145.00 lb  

 

 Heart Rate  72 /min  

 

 BP Systolic  137 mmHg  

 

 BP Diastolic  69 mmHg  

 

 BMI (Body Mass Index)  25.7 kg/m2  









 2016  2:25pm  Heart Rate  70 /min  









 BP Systolic Sitting  133 mmHg  

 

 BP Diastolic Sitting  73 mmHg  

 

 Body Temperature  98.4 F  

 

 Pain Level  4  

 

 O2 % BldC Oximetry  97 %  









 2015  9:31am  Height  63 inches  5'3"









 Weight  148.00 lb  

 

 Heart Rate  70 /min  

 

 BP Systolic Sitting  128 mmHg  

 

 BP Diastolic Sitting  82 mmHg  

 

 Respiratory Rate  15 /min  

 

 Body Temperature  98.5 F  

 

 O2 % BldC Oximetry  98 %  

 

 BMI (Body Mass Index)  26.2 kg/m2  









 2015 10:39am  Weight  148.00 lb  









 Heart Rate  66 /min  

 

 BP Systolic Sitting  122 mmHg  

 

 BP Diastolic Sitting  68 mmHg  

 

 Respiratory Rate  14 /min  

 

 Body Temperature  98.8 F  

 

 O2 % BldC Oximetry  98 %  









 10/10/2015 10:03am  Body Temperature  97.1 F  

 

 2015  3:47pm  Weight  145.75 lb  









 Heart Rate  72 /min  

 

 BP Systolic Sitting  154 mmHg  

 

 BP Diastolic Sitting  79 mmHg  









 2015 10:00am  Weight  148.00 lb  









 Heart Rate  71 /min  

 

 BP Systolic Sitting  125 mmHg  

 

 BP Diastolic Sitting  70 mmHg  

 

 Body Temperature  97.7 F  









 2015  8:44am  Weight  145.75 lb  









 Heart Rate  77 /min  

 

 BP Systolic Sitting  134 mmHg  

 

 BP Diastolic Sitting  68 mmHg  

 

 Body Temperature  97.7 F  

 

 O2 % BldC Oximetry  98 %  









 2015 12:56pm  Height  63 inches  5'3"









 Weight  150.00 lb  

 

 Heart Rate  74 /min  

 

 BP Systolic  139 mmHg  

 

 BP Diastolic  64 mmHg  

 

 Body Temperature  98.3 F  

 

 BMI (Body Mass Index)  26.6 kg/m2  









 2014 10:00am  Height  63 inches  5'3"









 Weight  148.50 lb  

 

 Heart Rate  83 /min  

 

 BP Systolic Sitting  120 mmHg  

 

 BP Diastolic Sitting  60 mmHg  

 

 Body Temperature  97.3 F  

 

 O2 % BldC Oximetry  96 %  

 

 BMI (Body Mass Index)  26.3 kg/m2  









 2014 12:56pm  Height  63 inches  5'3"









 Weight  148.00 lb  

 

 Heart Rate  76 /min  

 

 BP Systolic Sitting  118 mmHg  

 

 BP Diastolic Sitting  62 mmHg  

 

 Body Temperature  98.3 F  

 

 BMI (Body Mass Index)  26.2 kg/m2  









 2014 11:40am  Weight  147.50 lb  









 Heart Rate  76 /min  

 

 BP Systolic  144 mmHg  

 

 BP Diastolic  76 mmHg  

 

 Respiratory Rate  16 /min  

 

 Body Temperature  98.3 F  









 10/17/2013 12:59pm  Weight  147.00 lb  









 Heart Rate  72 /min  

 

 BP Systolic  122 mmHg  

 

 BP Diastolic  62 mmHg  









 2013  4:16pm  Height  62 inches  5'2"









 Weight  144.50 lb  

 

 Heart Rate  84 /min  

 

 BP Systolic Sitting  110 mmHg  

 

 BP Diastolic Sitting  60 mmHg  

 

 Body Temperature  98.1 F  

 

 BMI (Body Mass Index)  26.4 kg/m2  









 2013  2:28pm  Height  62.5 inches  5'2.50"









 Weight  146.00 lb  

 

 Heart Rate  72 /min  

 

 BP Systolic Sitting  130 mmHg  

 

 BP Diastolic Sitting  60 mmHg  

 

 BMI (Body Mass Index)  26.3 kg/m2  









 2012  9:37am  Height  63.5 inches  5'3.50"









 Weight  146.00 lb  

 

 Heart Rate  78 /min  

 

 BP Systolic Sitting  126 mmHg  

 

 BP Diastolic Sitting  68 mmHg  

 

 BMI (Body Mass Index)  25.5 kg/m2  









 10/16/2012  9:54am  Height  63.5 inches  5'3.50"









 Weight  146.00 lb  

 

 Heart Rate  72 /min  

 

 BP Systolic Sitting  120 mmHg  

 

 BP Diastolic Sitting  62 mmHg  

 

 BMI (Body Mass Index)  25.5 kg/m2  









 2012 10:27am  Height  63.5 inches  5'3.50"









 Weight  144.50 lb  

 

 Heart Rate  60 /min  

 

 BP Systolic Sitting  130 mmHg  

 

 BP Diastolic Sitting  82 mmHg  

 

 BMI (Body Mass Index)  25.2 kg/m2  









 2012 11:27am  Height  63.5 inches  5'3.50"









 Weight  142.00 lb  

 

 Heart Rate  76 /min  

 

 BP Systolic Sitting  126 mmHg  

 

 BP Diastolic Sitting  62 mmHg  

 

 BMI (Body Mass Index)  24.8 kg/m2  









 2011  1:24pm  Height  63.5 inches  5'3.50"









 Weight  140.00 lb  

 

 Heart Rate  68 /min  

 

 BP Systolic Sitting  134 mmHg  

 

 BP Diastolic Sitting  52 mmHg  

 

 O2 % BldC Oximetry  97 %  

 

 BMI (Body Mass Index)  24.4 kg/m2  









 2011 10:03am  Height  63.5 inches  5'3.50"









 Weight  140.00 lb  

 

 Heart Rate  64 /min  

 

 BP Systolic Sitting  110 mmHg  

 

 BP Diastolic Sitting  76 mmHg  

 

 BMI (Body Mass Index)  24.4 kg/m2  









 2011 11:06am  Height  63.5 inches  5'3.50"









 Weight  139.00 lb  

 

 Heart Rate  68 /min  

 

 BP Systolic Sitting  104 mmHg  

 

 BP Diastolic Sitting  78 mmHg  

 

 BMI (Body Mass Index)  24.2 kg/m2  









 2011 10:33am  Weight  140.00 lb  









 Heart Rate  70 /min  

 

 BP Systolic  130 mmHg  

 

 BP Diastolic  60 mmHg  









 2011 11:35am  Weight  139.00 lb  









 Heart Rate  60 /min  

 

 BP Systolic  126 mmHg  

 

 BP Diastolic  60 mmHg  

 

 Body Temperature  98.7 F  

 

 O2 % BldC Oximetry  98 %  









 2010  7:38pm  Height  62.5 inches  5'2.50"









 Weight  142.75 lb  

 

 Heart Rate  68 /min  

 

 BP Systolic  132 mmHg  

 

 BP Diastolic  70 mmHg  

 

 BMI (Body Mass Index)  25.7 kg/m2  







Results







 Test  Date  Facility  Test  Result  H/L  Range  Note

 

 Lipid Profile  2019  [facility]  Triglycerides  132 mg/dL    
  1



 (Trig/Chol/HDL)    [address]  [place] [address] [phone]          









 Cholesterol  143 mg/dL      2

 

 HDL Cholesterol  46.8 mg/dL      3

 

 LDL Cholesterol  70 mg/dL      4









 Comp Metabolic Panel  2019  [facility]  Sodium  141 mmol/L  N
  135-145  



     [address]  [place] [address] [phone]          









 Potassium  4.1 mmol/L  N  3.5-5.0  

 

 Chloride  105 mmol/L  N  101-111  

 

 Co2 Carbon Dioxide  30 mmol/L  N  22-32  

 

 Anion Gap  6 mmol/L  N  2-11  

 

 Glucose  94 mg/dL  N    

 

 Blood Urea Nitrogen  12 mg/dL  N  6-24  

 

 Creatinine  0.66 mg/dL  N  0.51-0.95  

 

 BUN/Creatinine Ratio  18.2  N  8-20  

 

 Calcium  9.3 mg/dL  N  8.6-10.3  

 

 Total Protein  6.3 g/dL  Low  6.4-8.9  

 

 Albumin  3.9 g/dL  N  3.2-5.2  

 

 Globulin  2.4 g/dL  N  2-4  

 

 Albumin/Globulin Ratio  1.6  N  1-3  

 

 Total Bilirubin  0.50 mg/dL  N  0.2-1.0  

 

 Alkaline Phosphatase  73 U/L  N    

 

 Alt  24 U/L  N  7-52  

 

 Ast  23 U/L  N  13-39  

 

 Egfr Non-  84.5    >60  

 

 Egfr   102.3    >60  5









 Xray  2019  [facility]  Inj/Aspir Major JT  <pending>      



       Or Bursa W/ US        

 

 CBC Auto Diff  2018  [facility]  White Blood Count  8.2 10^3/
uL  N  3.5-10.8  



     [address]  [place] [address] [phone]          









 Red Blood Count  4.11 10^6/uL  N  4.0-5.4  

 

 Hemoglobin  12.8 g/dL  N  12.0-16.0  

 

 Hematocrit  39 %  N  35-47  

 

 Mean Corpuscular Volume  95 fL  N  80-97  

 

 Mean Corpuscular Hemoglobin  31 pg  N  27-31  

 

 Mean Corpuscular HGB Conc  33 g/dL  N  31-36  

 

 Red Cell Distribution Width  15 %  N  10.5-15  

 

 Platelet Count  244 10^3/uL  N  150-450  

 

 Mean Platelet Volume  9.2 um3  N  7.4-10.4  

 

 Abs Neutrophils  5.3 10^3/uL  N  1.5-7.7  

 

 Abs Lymphocytes  2.1 10^3/uL  N  1.0-4.8  

 

 Abs Monocytes  0.7 10^3/uL  N  0-0.8  

 

 Abs Eosinophils  0.1 10^3/uL  N  0-0.6  

 

 Abs Basophils  0.1 10^3/uL  N  0-0.2  

 

 Abs Nucleated RBC  0 10^3/uL      

 

 Granulocyte %  64.2 %  N  38-83  

 

 Lymphocyte %  25.5 %  N  25-47  

 

 Monocyte %  8.1 %  High  0-7  

 

 Eosinophil %  1.5 %  N  0-6  

 

 Basophil %  0.7 %  N  0-2  

 

 Nucleated Red Blood Cells %  0      









 Protein  2018  [facility]  Total  6.7 g/dL    6.3 -  



 Electrophoresis    [address]  Protein(Pep)      7.9  



     [place] [address] [phone]          









 Albumin  3.2 g/dL  Abnormal  3.4-4.7  

 

 Alpha-1 Globulin  0.2 g/dL    0.1-0.3  

 

 Alpha-2 Globulin  1.2 g/dL  Abnormal  0.6-1.0  

 

 Beta Globulin  1.2 g/dL    0.7-1.2  

 

 Gamma Globulin  0.9 g/dL    0.6-1.6  

 

 Albumin/Globulin Ratio  0.93      

 

 Impression  See Comment      6









 Laboratory test  2018  [facility]  Erythrocyte Sed  15 mm/Hr  
N  0-40  



 finding    [address]  Rate        



     [place] [address] [phone]          

 

 Lipid Profile  2018  [facility]  Triglycerides  169 mg/dL    
  7



 (Trig/Chol/HDL)    [address]          



     [place] [address] [phone]          









 Cholesterol  140 mg/dL      8

 

 HDL Cholesterol  42.0 mg/dL      9

 

 LDL Cholesterol  64 mg/dL      10









 Comp Metabolic Panel  2018  [facility]  Sodium  139 mmol/L  N
  139-145  



     [address]          



     [place] [address] [phone]          









 Potassium  4.5 mmol/L  N  3.5-5.0  

 

 Chloride  105 mmol/L  N  101-111  

 

 Co2 Carbon Dioxide  27 mmol/L  N  22-32  

 

 Anion Gap  7 mmol/L  N  2-11  

 

 Glucose  87 mg/dL  N    

 

 Blood Urea Nitrogen  14 mg/dL  N  6-24  

 

 Creatinine  0.64 mg/dL  N  0.51-0.95  

 

 BUN/Creatinine Ratio  21.9  High  8-20  

 

 Calcium  9.6 mg/dL  N  8.6-10.3  

 

 Total Protein  6.2 g/dL  Low  6.4-8.9  

 

 Albumin  3.9 g/dL  N  3.2-5.2  

 

 Globulin  2.3 g/dL  N  2-4  

 

 Albumin/Globulin Ratio  1.7  N  1-3  

 

 Total Bilirubin  0.40 mg/dL  N  0.2-1.0  

 

 Alkaline Phosphatase  56 U/L  N    

 

 Alt  22 U/L  N  7-52  

 

 Ast  21 U/L  N  13-39  

 

 Egfr Non-  87.8    >60  

 

 Egfr   112.9    >60  11









 Lipid Profile  2017  [facility]  Triglycerides  159 mg/dL  N 
   12



 (Trig/Chol/HDL)    [address]          



     [place] [address] [phone]          









 Cholesterol  140 mg/dL  N    13

 

 HDL Cholesterol  39.3 mg/dL  N    14

 

 LDL Cholesterol  69 mg/dL  N    15









 Comp Metabolic Panel  2017  [facility]  Sodium  138 mmol/L  N
  133-145  



     [address]          



     [place] [address] [phone]          









 Potassium  4.1 mmol/L  N  3.5-5.0  

 

 Chloride  107 mmol/L  N  101-111  

 

 Co2 Carbon Dioxide  29 mmol/L  N  22-32  

 

 Anion Gap  2 mmol/L  N  2-11  

 

 Glucose  92 mg/dL  N    

 

 Blood Urea Nitrogen  15 mg/dL  N  6-24  

 

 Creatinine  0.62 mg/dL  N  0.51-0.95  

 

 BUN/Creatinine Ratio  24.2  High  8-20  

 

 Calcium  9.1 mg/dL  N  8.6-10.3  

 

 Total Protein  6.2 g/dL  Low  6.4-8.9  

 

 Albumin  3.8 g/dL  N  3.2-5.2  

 

 Globulin  2.4 g/dL  N  2-4  

 

 Albumin/Globulin Ratio  1.6  N  1-3  

 

 Total Bilirubin  0.50 mg/dL  N  0.2-1.0  

 

 Alkaline Phosphatase  58 U/L  N    

 

 Alt  22 U/L  N  7-52  

 

 Ast  20 U/L  N  13-39  

 

 Egfr Non-  91.1  N  >60  

 

 Egfr   117.1  N  >60  16









 CBC Auto Diff  2017  [facility]  White Blood  7.1 10^3/uL  N  
3.5-10.8  



     [address]  Count        



     [place] [address] [phone]          









 Red Blood Count  4.19 10^6/uL  N  4.0-5.4  

 

 Hemoglobin  13.1 g/dL  N  12.0-16.0  

 

 Hematocrit  40 %  N  35-47  

 

 Mean Corpuscular Volume  96 fL  N  80-97  

 

 Mean Corpuscular Hemoglobin  31 pg  N  27-31  

 

 Mean Corpuscular HGB Conc  33 g/dL  N  31-36  

 

 Red Cell Distribution Width  15 %  N  10.5-15  

 

 Platelet Count  229 10^3/uL  N  150-450  

 

 Mean Platelet Volume  9 um3  N  7.4-10.4  

 

 Abs Neutrophils  4.3 10^3/uL  N  1.5-7.7  

 

 Abs Lymphocytes  2.0 10^3/uL  N  1.0-4.8  

 

 Abs Monocytes  0.6 10^3/uL  N  0-0.8  

 

 Abs Eosinophils  0.1 10^3/uL  N  0-0.6  

 

 Abs Basophils  0.1 10^3/uL  N  0-0.2  

 

 Abs Nucleated RBC  0 10^3/uL  N    

 

 Granulocyte %  61.0 %  N  38-83  

 

 Lymphocyte %  28.3 %  N  25-47  

 

 Monocyte %  8.1 %  N  1-9  

 

 Eosinophil %  1.9 %  N  0-6  

 

 Basophil %  0.7 %  N  0-2  

 

 Nucleated Red Blood Cells %  0  N    









 Urine Culture And  2017  [facility]  Urine Culture  SEE 
RESULT      17



 Sensitivities    [address]    BELOW      



     [place] [address] [phone]          

 

 Ua Routine  2017  [facility]  Ua Specific  1.005      



       Gravity        









 Ua PH  8      

 

 Ua Color  yellow      

 

 Ua Appera  clear      

 

 Ua WBC  trace      

 

 Ua Protein  neg      

 

 Ua Glucose  neg      

 

 Ua Ketones  neg      

 

 Ua Bilirubin  neg      

 

 Ua Urobilinogen  neg      

 

 Ua Nitrite  neg      

 

 Ua Occult Blood  neg      









 Lipid Profile  12/15/2016  [facility]  Triglycerides  137 mg/dL  N 
   18



 (Trig/Chol/HDL)    [address]          



     [place] [address] [phone]          









 Cholesterol  147 mg/dL  N    19

 

 HDL Cholesterol  48.2 mg/dL  N    20

 

 LDL Cholesterol  71 mg/dL  N    21









 Comp Metabolic Panel  12/15/2016  [facility]  Sodium  140 mmol/L  N
  133-145  



     [address] [place] [address] [phone]          









 Potassium  4.2 mmol/L  N  3.5-5.0  

 

 Chloride  105 mmol/L  N  101-111  

 

 Co2 Carbon Dioxide  29 mmol/L  N  22-32  

 

 Anion Gap  6 mmol/L  N  2-11  

 

 Glucose  92 mg/dL  N    

 

 Blood Urea Nitrogen  15 mg/dL  N  6-24  

 

 Creatinine  0.62 mg/dL  N  0.51-0.95  

 

 BUN/Creatinine Ratio  24.2  High  8-20  

 

 Calcium  9.2 mg/dL  N  8.6-10.3  

 

 Total Protein  6.5 g/dL  N  6.4-8.9  

 

 Albumin  4.0 g/dL  N  3.2-5.2  

 

 Globulin  2.5 g/dL  N  2-4  

 

 Albumin/Globulin Ratio  1.6  N  1-3  

 

 Total Bilirubin  0.50 mg/dL  N  0.2-1.0  

 

 Alkaline Phosphatase  58 U/L  N    

 

 Alt  25 U/L  N  7-52  

 

 Ast  20 U/L  N  13-39  

 

 Egfr Non-  91.3  N  >60  

 

 Egfr   117.4  N  >60  22









 Comp Metabolic Panel  2015  [facility]  Sodium  140 mmol/L  N
  133-145  



     [address] [place] [address] [phone]          









 Potassium  4.1 mmol/L  N  3.5-5.0  

 

 Chloride  105 mmol/L  N  101-111  

 

 Co2 Carbon Dioxide  28 mmol/L  N  22-32  

 

 Anion Gap  7 mmol/L  N  2-11  

 

 Glucose  96 mg/dL  N    

 

 Blood Urea Nitrogen  15 mg/dL  N  6-24  

 

 Creatinine  0.72 mg/dL  N  0.51-0.95  

 

 BUN/Creatinine Ratio  20.8  High  8-20  

 

 Calcium  9.3 mg/dL  N  8.6-10.3  

 

 Total Protein  6.5 g/dL  N  6.4-8.9  

 

 Albumin  4.1 g/dL  N  3.2-5.2  

 

 Globulin  2.4 g/dL  N  2-4  

 

 Albumin/Globulin Ratio  1.7  N  1-3  

 

 Total Bilirubin  0.50 mg/dL  N  0.2-1.0  

 

 Alkaline Phosphatase  58 U/L  N    

 

 Alt  24 U/L  N  7-52  

 

 Ast  22 U/L  N  13-39  

 

 Egfr Non-  77.0  N  >60  

 

 Egfr   99.0  N  >60  23









 Lipid Profile  2015  [facility]  Triglycerides  149 mg/dL  N 
   24



 (Trig/Chol/HDL)    [address]          



     [place] [address] [phone]          









 Cholesterol  157 mg/dL  N    25

 

 HDL Cholesterol  47.7 mg/dL  N    26

 

 LDL Cholesterol  80 mg/dL  N    27









 CKMB  2015  [facility]  CKMB  ng/mL  1.7 ng/mL  N  0.6-6.3  



     [address]          



     [place] [address] [phone]          

 

 Laboratory test  2015  [facility]  Troponin-I  0.00 ng/mL  N  
<0.03  28



 finding    [address]  (TnI)        



     [place] [address] [phone]          

 

 CBC Auto Diff  2015  [facility]  White Blood  6.5 10^3/uL  N  
3.5-10.8  



     [address]  Count        



     [place] [address] [phone]          









 Red Blood Count  4.28 10^6/uL  N  4.0-5.4  

 

 Hemoglobin  13.8 g/dL  N  12.0-16.0  

 

 Hematocrit  42 %  N  35-47  

 

 Mean Corpuscular Volume  98 fL  High  80-97  

 

 Mean Corpuscular Hemoglobin  32 pg  High  27-31  

 

 Mean Corpuscular HGB Conc  33 g/dL  N  31-36  

 

 Red Cell Distribution Width  14 %  N  10.5-15  

 

 Platelet Count  218 10^3/uL  N  150-450  

 

 Mean Platelet Volume  9 um3  N  7.4-10.4  

 

 Abs Neutrophils  4.1 10^3/uL  N  1.5-7.7  

 

 Abs Lymphocytes  1.8 10^3/uL  N  1.0-4.8  

 

 Abs Monocytes  0.5 10^3/uL  N  0-0.8  

 

 Abs Eosinophils  0.1 10^3/uL  N  0-0.6  

 

 Abs Basophils  0 10^3/uL  N  0-0.2  

 

 Abs Nucleated RBC  0 10^3/uL  N    

 

 Granulocyte %  62.2 %  N  38-83  

 

 Lymphocyte %  27.8 %  N  25-47  

 

 Monocyte %  7.8 %  N  1-9  

 

 Eosinophil %  1.6 %  N  0-6  

 

 Basophil %  0.6 %  N  0-2  

 

 Nucleated Red Blood Cells %  0  N    









 Laboratory test  2015  [facility]  B-Type Natriuretic  74 pg/
mL  N    29



 finding    [address]  Peptide BNP        



     [place] [address] [phone]          

 

 Lipid Profile  2014  [facility]  Triglycerides  202 mg/dL  N 
   30, 31



 (Trig/Chol/HDL)    [address]          



     [place] [address] [phone]          









 Cholesterol  152 mg/dL  N    32

 

 HDL Cholesterol  44.5 mg/dL  N    33

 

 LDL Cholesterol  67 mg/dL  N    34









 Comp Metabolic Panel  2014  [facility]  Sodium  139 mmol/L  N
  133-145  



     [address]          



     [place] [address] [phone]          









 Potassium  4.2 mmol/L  N  3.5-5.0  35

 

 Chloride  104 mmol/L  N  101-111  

 

 Co2 Carbon Dioxide  30 mmol/L  N  22-32  

 

 Anion Gap  5 mmol/L  N  2-11  

 

 Glucose  93 mg/dL  N    

 

 Blood Urea Nitrogen  10 mg/dL  N  6-24  

 

 Creatinine  0.66 mg/dL  N  0.51-0.95  

 

 BUN/Creatinine Ratio  15.2  N  8-20  

 

 Calcium  9.3 mg/dL  N  8.6-10.3  

 

 Total Protein  6.3 g/dL  Low  6.4-8.9  

 

 Albumin  3.9 g/dL  N  3.2-5.2  

 

 Globulin  2.4 g/dL  N  2-4  

 

 Albumin/Globulin Ratio  1.6  N  1-3  

 

 Total Bilirubin  0.50 mg/dL  N  0.2-1.0  

 

 Alkaline Phosphatase  63 U/L  N    

 

 Alt  24 U/L  N  7-52  

 

 Ast  22 U/L  N  13-39  

 

 Egfr Non-  85.3  N  >60  

 

 Egfr   109.7  N  >60  36









 Throat-Beta  2014  [facility]  Throat Beta Strep  (SEE      37
, 38



 Strept    [address]  Culture  NOTE)      



     [place] [address] [phone]          

 

 Surgical  2014  [facility]  S  RUN DATE:      39



 Pathology    [address]    /      



     [place] [address]    <SEE      



     [phone]    NOTE>      

 

 Surgical  04/15/2014  [facility]  S  RUN DATE:      40



 Pathology    [address]    04/15/      



     [place] [address]    <SEE      



     [phone]    NOTE>      

 

 Lipid Profile  10/10/2013  [facility]  Triglycerides  155 mg/dL    
40-2  



 (Trig/Chol/HDL)    [address]        [address]  



     [place] [address] [phone]          









 Cholesterol  149 mg/dL    Less than 200  

 

 HDL Cholesterol  47 mg/dL    40-60  41

 

 Cholesterol/HDL Ratio  3.2 Average    1-4.44  

 

 LDL Cholesterol  71.0    Less Than 100  42









 Comp Metabolic Panel  10/10/2013  [facility]  Sodium  140 mmol/L    
133-145  



     [address]          



     [place] [address] [phone]          









 Potassium  4.1 mmol/L    3.5-5.0  

 

 Chloride  107 mmol/L    101-111  

 

 Co2 Carbon Dioxide  29.0 mmol/L    22-32  

 

 Anion Gap  4.0 mmol/L    2-11  

 

 Glucose  97 mg/dL      

 

 Blood Urea Nitrogen  10 mg/dL    6-24  

 

 Creatinine  0.60 mg/dL    0.50-1.40  

 

 BUN/Creatinine Ratio  16.7    8-20  

 

 Calcium  9.1 mg/dL    8.1-9.9  

 

 Total Protein  6.1 g/dL  Low  6.2-8.1  

 

 Albumin  3.6 g/dL    3.2-5.2  

 

 Globulin  2.5 g/dL    2-4  

 

 Albumin/Globulin Ratio  1.4    1-3  

 

 Total Bilirubin  0.5 mg/dL    0.4-1.5  

 

 Alkaline Phosphatase  63 U/L      

 

 Alt  29 U/L    14-54  

 

 Ast  29 U/L    12-42  

 

 Egfr Non-  95.5    >60  

 

 Egfr   122.8    >60  43









 CBC W/Electronic  2013  [facility]  White Blood  6.9 10^3/uL 
   4.8-10.8  



 Diff    [address]  Count        



     [place] [address] [phone]          









 Red Blood Count  4.12 10^6/uL    4.0-5.4  

 

 Hemoglobin  13.2 g/dL    12.0-16.0  

 

 Hematocrit  39 %    35-47  

 

 Mean Corpuscular Volume  95 fL    80-97  

 

 Mean Corpuscular Hemoglobin  32 pg  High  27-31  

 

 Mean Corpuscular HGB Conc  34 g/dL    31-36  

 

 Red Cell Distribution Width  14 %    10.5-15  

 

 Platelet Count  245 10^3/uL    150-450  

 

 Mean Platelet Volume  9 um3    7.4-10.4  

 

 Abs Neutrophils  4.3 10^3/uL    1.5-7.7  

 

 Abs Lymphocytes  2.0 10^3/uL    1.0-4.8  

 

 Abs Monocytes  0.5 10^3/uL    0-0.8  

 

 Abs Eosinophils  0.2 10^3/uL    0-0.6  

 

 Abs Basophils  0 10^3/uL    0-0.2  

 

 Abs Nucleated RBC  0 10^3/uL      

 

 Granulocyte %  61.6 %    38-83  

 

 Lymphocyte %  28.9 %    25-47  

 

 Monocyte %  6.8 %    1-9  

 

 Eosinophil %  2.2 %    0-6  

 

 Basophil %  0.5 %    0-2  

 

 Nucleated Red Blood Cells %  0      









 CMP Panel  2013  [facility]  Sodium  141 mmol/L    133-145  



     [address]          



     [place] [address] [phone]          









 Potassium  4.0 mmol/L    3.5-5.0  

 

 Chloride  107 mmol/L    101-111  

 

 Co2 Carbon Dioxide  27.0 mmol/L    22-32  

 

 Anion Gap  7.0 mmol/L    2-11  

 

 Glucose  142 mg/dL  High    

 

 Blood Urea Nitrogen  12 mg/dL    6-24  

 

 Creatinine  0.70 mg/dL    0.50-1.40  

 

 BUN/Creatinine Ratio  17.1    8-20  

 

 Calcium  9.6 mg/dL    8.1-9.9  

 

 Total Protein  6.2 g/dL    6.2-8.1  

 

 Albumin  3.6 g/dL    3.2-5.2  

 

 Globulin  2.6 g/dL    2-4  

 

 Albumin/Globulin Ratio  1.4    1-3  

 

 Total Bilirubin  0.7 mg/dL    0.4-1.5  

 

 Alkaline Phosphatase  62 U/L      

 

 Alt  32 U/L    14-54  

 

 Ast  27 U/L    12-42  

 

 Egfr Non-  79.9    >60  

 

 Egfr   102.8    >60  44









 Ua Routine  2013  [facility]  Ua Specific Gravity  1.005      









 Ua PH  5.0      

 

 Ua Color  neg      

 

 Ua Appera  neg      

 

 Ua WBC  neg      

 

 Ua Protein  neg      

 

 Ua Glucose  neg      

 

 Ua Ketones  neg      

 

 Ua Bilirubin  neg      

 

 Ua Urobilinogen  neg      

 

 Ua Nitrite  neg      

 

 Ua Occult Blood  neg      









 Laboratory test finding  10/23/2012  [facility]  Alt  26 U/L    14-
54  45



     [address]          



     [place] [address] [phone]          









 Ast  25 U/L    12-42  46

 

 TSH (Thyroid Stimulating Horm)  2.57 MIU/ML    0.34-5.60  47

 

 Vitamin B12  336 pg/mL    180-914  48









 Lipid Profile  10/23/2012  [facility]  Triglycerides  95 mg/dL    40
-200  



 (Trig/Chol/HDL)    [address]  [place] [address] [phone]          









 Cholesterol  157 mg/dL    Less than 200  49

 

 HDL Cholesterol  57 mg/dL    40-60  50

 

 Cholesterol/HDL Ratio  2.8 AVERAGE    1-4.44  

 

 LDL Cholesterol  81.0 mg/dL    Less Than 100  









 Comp Metabolic Panel  2012  [facility]  Sodium  140 mmol/L    
135-145  



     [address]  [place] [address] [phone]          









 Potassium  4.1 mmol/L    3.5-5.0  

 

 Chloride  105 mmol/L    101-111  

 

 Co2 (Carbon Dioxide)  31.0 mmol/L    22-32  

 

 Anion Gap  4.0 mmol/L    2-11  51

 

 Glucose  96 mg/dL      

 

 BUN  16 mg/dL    6-24  

 

 Creatinine  0.8 mg/dL    0.50-1.40  

 

 One Over Creatinine  1.25      

 

 BUN/Creatinine Ratio  20.0    8-20  

 

 Calcium  9.3 mg/dL    8.1-9.9  

 

 Total Protein  6.2 GM/DL    6.2-8.1  

 

 Albumin  3.8 GM/DL    3.2-5.2  

 

 Globulin  2.4 GM/DL    2-4  

 

 Albumin/Globulin Ratio  1.6    1-3  

 

 Bilirubin Total  0.5 mg/dL    0.4-1.5  52

 

 Alkaline Phosphatase  77 U/L      

 

 Alt (SGPT)  28 U/L    14-54  

 

 Ast (Sgot)  27 U/L    12-42  

 

 eGFR Non-  68.8    > 60  

 

 eGFR   88.5    > 60  53









 Lipid Profile  2012  [facility]  Triglyceride  137 mg/dL    40
-200  



 (Trig/Chol/HDL)    [address]  [place] [address] [phone]          









 Cholesterol  184 mg/dL    Less Than 200  54

 

 High Density Lipoprotein  50 mg/dL    40-60  55

 

 Cholesterol/HDL Ratio  3.68 AVERAGE    1-4.44  

 

 Low Density Lipoprotein  107 mg/dL  High  Less Than 100  56









 Lipid Profile  2011  [facility]  Triglyceride  92 mg/dL    40-
200  



 (Trig/Chol/HDL)    [address]  [place] [address] [phone]          









 Cholesterol  156 mg/dL    Less Than 200  57

 

 High Density Lipoprotein  46 mg/dL    40-60  58

 

 Cholesterol/HDL Ratio  3.39 AVERAGE    1-4.44  

 

 Low Density Lipoprotein  92 mg/dL    Less Than 100  59









 Comp Metabolic Panel  2011  [facility]  Sodium  142 mmol/L    
135-145  



     [address]          



     [place] [address] [phone]          









 Potassium  4.6 mmol/L    3.5-5.0  

 

 Chloride  107 mmol/L    101-111  

 

 Co2 (Carbon Dioxide)  30.0 mmol/L    22-32  

 

 Anion Gap  5.0 mmol/L    2-11  60

 

 Glucose  103 mg/dL  High    

 

 BUN  12 mg/dL    6-24  

 

 Creatinine  0.7 mg/dL    0.50-1.40  

 

 One Over Creatinine  1.42      

 

 BUN/Creatinine Ratio  17.1    8-20  

 

 Calcium  9.4 mg/dL    8.1-9.9  

 

 Total Protein  6.3 GM/DL    6.2-8.1  

 

 Albumin  3.7 GM/DL    3.2-5.2  

 

 Globulin  2.6 GM/DL    2-4  

 

 Albumin/Globulin Ratio  1.4    1-3  

 

 Bilirubin Total  0.5 mg/dL    0.4-1.5  61

 

 Alkaline Phosphatase  61 U/L      

 

 Alt (SGPT)  25 U/L    14-54  

 

 Ast (Sgot)  28 U/L    12-42  

 

 eGFR Non-  80.3    > 60  

 

 eGFR   103.3    > 60  62









 Manual Differential  2011  [facility]  Polysegmented  82 %    
38-83  



     [address]  Neutrophil        



     [place] [address] [phone]          









 Band Neutrophil  4 %    0-8  

 

 Lymphocyte  11 %  Low  25-47  

 

 Monocyte  3 %    0-13  

 

 Absolute Neutrophil Count  9.9      

 

 RBC Morphology  NORMAL      









 CBC With  2011  [facility]  White Blood  11.6 CUMM  High  4.8-
10.8  



 Electronic Diff    [address]  Count        



     [place] [address] [phone]          









 Red Cell Count  3.99 CUMM  Low  4.2-5.4  

 

 Hemoglobin  12.9 g/dL    12.0-16.0  

 

 Hematocrit  38 %    35-47  

 

 Mean Corpuscular Volume  95 um3    79-97  

 

 Mean Corpuscular Hemoglob  32 pg  High  27-31  

 

 Mean Corpuscular HGB Cone  34 g/dL    32-36  

 

 Redcell Distribution WDTH  14 %    10.5-15  

 

 Platelet Count  259 CUMM    150-450  

 

 Mean Platelet Volume  8.4 um3    7.4-10.4  63









 Comp Metabolic Panel  2011  [facility]  Sodium  137 mmol/L    
135-145  



     [address]          



     [place] [address] [phone]          









 Potassium  4.4 mmol/L    3.5-5.0  

 

 Chloride  101 mmol/L    101-111  

 

 Co2 (Carbon Dioxide)  27.0 mmol/L    22-32  

 

 Anion Gap  9.0 mmol/L    2-11  64

 

 Glucose  80 mg/dL      

 

 BUN  10 mg/dL    6-24  

 

 Creatinine  0.80 mg/dL    0.50-1.40  

 

 One Over Creatinine  1.20      

 

 BUN/Creatinine Ratio  12.5    8-20  

 

 Calcium  9.1 mg/dL    8.1-9.9  

 

 Total Protein  6.1 GM/DL  Low  6.2-8.1  

 

 Albumin  3.4 GM/DL    3.2-5.2  

 

 Globulin  2.7 GM/DL    2-4  

 

 Albumin/Globulin Ratio  1.3    1-3  

 

 Bilirubin Total  0.7 mg/dL    0.4-1.5  65

 

 Alkaline Phosphatase  75 U/L      

 

 Alt (SGPT)  24 U/L    14-54  

 

 Ast (Sgot)  25 U/L    12-42  

 

 eGFR Non-  73.4    > 60  

 

 eGFR   88.8    > 60  66









 Urine Culture &  2010  [facility]  Urine Culture  NG      67



 Sensitivi    [address]  Sensitivi        



     [place] [address] [phone]          

 

 Laboratory test  2010  [facility]  Amylase  48 U/L      
68



 finding    [address]  [place] [address] [phone]          









 Lipase  18 U/L  Low  22-51  

 

 C Reactive Protein  2.3 mg/dL  High  Less Than 0.5  









 Laboratory test  2010  [facility]  Stool For  NEGATIVE    
Negative  



 finding    [address]  Blood        



     [place] [address] [phone]          

 

 CBC With  2010  [facility]  White Blood  6.6 CUMM    4.8-10.8
  



 Electronic Diff    [address]  Count        



     [place] [address] [phone]          









 Red Cell Count  4.11 CUMM  Low  4.2-5.4  

 

 Hemoglobin  13.4 g/dL    12.0-16.0  

 

 Hematocrit  39 %    35-47  

 

 Mean Corpuscular Volume  95 um3    79-97  

 

 Mean Corpuscular Hemoglob  33 pg  High  27-31  

 

 Mean Corpuscular HGB Cone  34 g/dL    32-36  

 

 Redcell Distribution WDTH  13 %    10.5-15  

 

 Platelet Count  239 CUMM    150-450  

 

 Mean Platelet Volume  7.4 um3    7.4-10.4  

 

 Gran %  70.8 %    38-83  

 

 Lymph %  18.7 %  Low  25-47  

 

 Mononuclear %  9.7 %  High  1-9  

 

 Eosinophil %  0.8 %    0-6  

 

 Basophil %  0 %    0-2  

 

 Abs Lymphs  1.2    1.0-4.8  

 

 Abs Mononuclear  0.6    0-0.8  

 

 Absolute Neutrophil Count  4.7    1.5-7.7  

 

 Abs Eosinophils  0.1    0-0.6  

 

 Abs Basophils  0    0-0.2  69









 Urinalysis W/Microscopic  2010  [facility]  Ua Color  YELLOW 
   Yellow  



     [address] [place] [address] [phone]          









 Appearance-Urine  CLEAR    Clear  

 

 Specific Gravity-Ur  1.027    1.010-1.030  

 

 Esterase-Urine  1+  Abnormal  Negative  

 

 Nitrite  NEGATIVE    Negative  

 

 Urobilinogen-Ur-DIP  NEGATIVE    Negative  

 

 Protein-Urine  TRACE  Abnormal  Negative  

 

 PH-Urine  5.5    5-9  

 

 Blood-Urine  TRACE  Abnormal  Negative  

 

 Ketones-Urine  NEGATIVE    Negative  

 

 Bilirubin-Ur  NEGATIVE    Negative  

 

 Glucose-Urine  NEGATIVE    Negative  

 

 WBC-Urine  5-10  Abnormal  0-5  

 

 RBC-Urine  0-2    0-2  

 

 Mucus Urine  MODERATE    None  

 

 Epith Cells-Ur  FEW    None  









 Comp Metabolic Panel  2010  [facility]  Sodium  137 mmol/L    
135-145  



     [address] [place] [address] [phone]          









 Potassium  4.3 mmol/L    3.5-5.0  

 

 Chloride  106 mmol/L    101-111  

 

 Co2 (Carbon Dioxide)  24.0 mmol/L    22-32  

 

 Anion Gap  7.0 mmol/L    2-11  70

 

 Glucose  110 mg/dL  High    

 

 BUN  18 mg/dL    6-24  

 

 Creatinine  0.70 mg/dL    0.50-1.40  

 

 One Over Creatinine  1.40      

 

 BUN/Creatinine Ratio  25.7  High  8-20  

 

 Calcium  8.8 mg/dL    8.1-9.9  

 

 Total Protein  6.2 GM/DL    6.2-8.1  

 

 Albumin  3.6 GM/DL    3.2-5.2  

 

 Globulin  2.6 GM/DL    2-4  

 

 Albumin/Globulin Ratio  1.4    1-3  

 

 Bilirubin Total  0.7 mg/dL    0.4-1.5  71

 

 Alkaline Phosphatase  62 U/L      

 

 Alt (SGPT)  23 U/L    14-54  

 

 Ast (Sgot)  25 U/L    12-42  

 

 eGFR Non-  85.6    > 60  

 

 eGFR   103.5    > 60  72









 Comp Metabolic Panel  2010  [facility]  Sodium  141 mmol/L    
135-145  



     [address] [place] [address] [phone]          









 Potassium  4.1 mmol/L    3.5-5.0  

 

 Chloride  106 mmol/L    101-111  

 

 Co2 (Carbon Dioxide)  29.0 mmol/L    22-32  

 

 Anion Gap  6.0 mmol/L    2-11  73

 

 Glucose  85 mg/dL      74

 

 BUN  19 mg/dL    6-24  

 

 Creatinine  0.70 mg/dL    0.50-1.40  

 

 One Over Creatinine  1.40      

 

 BUN/Creatinine Ratio  27.1  High  8-20  

 

 Calcium  9.5 mg/dL    8.1-9.9  75

 

 Total Protein  6.4 GM/DL    6.2-8.1  

 

 Albumin  3.9 GM/DL    3.2-5.2  

 

 Globulin  2.5 GM/DL    2-4  

 

 Albumin/Globulin Ratio  1.6    1-3  

 

 Bilirubin Total  0.7 mg/dL    0.4-1.5  76

 

 Alkaline Phosphatase  52 U/L      

 

 Alt (SGPT)  23 U/L    14-54  

 

 Ast (Sgot)  22 U/L    12-42  

 

 eGFR Non-  85.6    > 60  

 

 eGFR   103.5    > 60  77









 Lipid Profile  2010  [facility]  Triglyceride  182 mg/dL    40
-200  



 (Trig/Chol/HDL)    [address] [place] [address] [phone]          









 Cholesterol  156 mg/dL    Less Than 200  78

 

 High Density Lipoprotein  37 mg/dL  Low  40-60  79

 

 Cholesterol/HDL Ratio  4.22 AVERAGE    1-4.44  

 

 Low Density Lipoprotein  83 mg/dL    Less Than 100  80









 1  Desirable: <150



   Borderline High: 150-199



   High: 200-499



   Very High: >500

 

 2  Desirable: <200



   Borderline High: 200-239



   High: >239

 

 3  Low: <40



   Desirable: 40-60



   High: >60

 

 4  Desirable: <100



   Near Optimal: 100-129



   Borderline High: 130-159



   High: 160-189



   Very High: >189

 

 5  *******Because ethnic data is not always readily available,



   this report includes an eGFR for both -Americans and



   non- Americans.****



   The National Kidney Disease Education Program (NKDEP) does



   not endorse the use of the MDRD equation for patients that



   are not between the ages of 18 and 70, are pregnant, have



   extremes of body size, muscle mass, or nutritional status,



   or are non- or non-.



   According to the [employer], irrespective of



   diagnosis, the stage of the disease is based on the level of



   kidney function:



   Stage Description                      GFR(mL/min/1.73 m(2))



   1     Kidney damage with normal or decreased GFR       90



   2     Kidney damage with mild decrease in GFR          60-89



   3     Moderate decrease in GFR                         30-59



   4     Severe decrease in GFR                           15-29



   5     Kidney failure                       <15 (or dialysis)

 

 6  RESULT: No apparent monoclonal protein on serum electrophoresis.



   Test Performed by:



   [address] [id number]

 

 7  Desirable: <150



   Borderline High: 150-199



   High: 200-499



   Very High: >500

 

 8  Desirable: <200



   Borderline High: 200-239



   High: >239

 

 9  Low: <40



   Desirable: 40-60



   High: >60

 

 10  Desirable: <100



   Near Optimal: 100-129



   Borderline High: 130-159



   High: 160-189



   Very High: >189

 

 11  *******Because ethnic data is not always readily available,



   this report includes an eGFR for both -Americans and



   non- Americans.****



   The National Kidney Disease Education Program (NKDEP) does



   not endorse the use of the MDRD equation for patients that



   are not between the ages of 18 and 70, are pregnant, have



   extremes of body size, muscle mass, or nutritional status,



   or are non- or non-.



   According to the National Kidney Foundation, irrespective of



   diagnosis, the stage of the disease is based on the level of



   kidney function:



   Stage Description                      GFR(mL/min/1.73 m(2))



   1     Kidney damage with normal or decreased GFR       90



   2     Kidney damage with mild decrease in GFR          60-89



   3     Moderate decrease in GFR                         30-59



   4     Severe decrease in GFR                           15-29



   5     Kidney failure                       <15 (or dialysis)

 

 12  Desirable <150



   Borderline high 150-199



   High 200-499



   Very High >500

 

 13  Desirable <200



   Borderline high 200-239



   High >239

 

 14  Low <40



   Desirable: 40-60



   High: >60

 

 15  Desirable: <100 mg/dL



   Near Optimal: 100-129 mg/dL



   Borderline High: 130-159 mg/dL



   High: 160-189 mg/dL



   Very High: >189 mg/dL

 

 16  *******Because ethnic data is not always readily available,



   this report includes an eGFR for both -Americans and



   non- Americans.****



   The National Kidney Disease Education Program (NKDEP) does



   not endorse the use of the MDRD equation for patients that



   are not between the ages of 18 and 70, are pregnant, have



   extremes of body size, muscle mass, or nutritional status,



   or are non- or non-.



   According to the National Kidney Foundation, irrespective of



   diagnosis, the stage of the disease is based on the level of



   kidney function:



   Stage Description                      GFR(mL/min/1.73 m(2))



   1     Kidney damage with normal or decreased GFR       90



   2     Kidney damage with mild decrease in GFR          60-89



   3     Moderate decrease in GFR                         30-59



   4     Severe decrease in GFR                           15-29



   5     Kidney failure                       <15 (or dialysis)

 

 17  SEE RESULT BELOW



   -----------------------------------------------------------------------------
---------------



   Name:  SHIELA SHEETS                 : 1930    Attend Dr: 
Tracy Carlin MD



   Acct:  [id number]  Unit: [id number]  AGE: 87            Location:  [facility]



   Re17                        SEX: F             Status:    REG REF



   -----------------------------------------------------------------------------
---------------



   



   SPEC: [id number]         ALEAH:       [facility] DR: Tracy Carlin MD



   REQ:  [id number]              RECD:   



   STATUS: COMP



   _



   SOURCE: URINE          SPDESC:



   ORDERED:  Urine Culture



   COMMENTS: [id number]



   Urine Source: Random



   



   -----------------------------------------------------------------------------
---------------



   Procedure                         Result                         Reported   
        Site



   -----------------------------------------------------------------------------
---------------



   Urine Culture  Final                                             17-
1605      ML



   No Growth (<1,000 CFU/mL)



   



   -----------------------------------------------------------------------------
---------------



   * ML - MAIN LAB ([facility])



   .



   



   



   



   



   



   



   



   



   



   



   



   



   



   



   



   



   



   



   



   



   



   



   



   



   ** END OF REPORT **



   



   * ML=Testing performed at Main Lab



   DEPARTMENT OF PATHOLOGY,  [address] [place] [address]



   Phone # [phone]      Fax #[phone]



   Onel Hernandez M.D. Director     [facility] # [id number]

 

 18  Desirable <150



   Borderline high 150-199



   High 200-499



   Very High >500

 

 19  Desirable <200



   Borderline high 200-239



   High >239

 

 20  Low <40



   Desirable: 40-60



   High: >60

 

 21  Desirable: <100 mg/dL



   Near Optimal: 100-129 mg/dL



   Borderline High: 130-159 mg/dL



   High: 160-189 mg/dL



   Very High: >189 mg/dL

 

 22  *******Because ethnic data is not always readily available,



   this report includes an eGFR for both -Americans and



   non- Americans.****



   The National Kidney Disease Education Program (NKDEP) does



   not endorse the use of the MDRD equation for patients that



   are not between the ages of 18 and 70, are pregnant, have



   extremes of body size, muscle mass, or nutritional status,



   or are non- or non-.



   According to the National Kidney Foundation, irrespective of



   diagnosis, the stage of the disease is based on the level of



   kidney function:



   Stage Description                      GFR(mL/min/1.73 m(2))



   1     Kidney damage with normal or decreased GFR       90



   2     Kidney damage with mild decrease in GFR          60-89



   3     Moderate decrease in GFR                         30-59



   4     Severe decrease in GFR                           15-29



   5     Kidney failure                       <15 (or dialysis)

 

 23  *******Because ethnic data is not always readily available,



   this report includes an eGFR for both -Americans and



   non- Americans.****



   The National Kidney Disease Education Program (NKDEP) does



   not endorse the use of the MDRD equation for patients that



   are not between the ages of 18 and 70, are pregnant, have



   extremes of body size, muscle mass, or nutritional status,



   or are non- or non-.



   According to the National Kidney Foundation, irrespective of



   diagnosis, the stage of the disease is based on the level of



   kidney function:



   Stage Description                      GFR(mL/min/1.73 m(2))



   1     Kidney damage with normal or decreased GFR       90



   2     Kidney damage with mild decrease in GFR          60-89



   3     Moderate decrease in GFR                         30-59



   4     Severe decrease in GFR                           15-29



   5     Kidney failure                       <15 (or dialysis)

 

 24  Desirable <150



   Borderline high 150-199



   High 200-499



   Very High >500

 

 25  Desirable <200



   Borderline high 200-239



   High >239

 

 26  Low <40



   Desirable: 40-60



   High: >60

 

 27  Desirable: <100 mg/dL



   Near Optimal: 100-129 mg/dL



   Borderline High: 130-159 mg/dL



   High: 160-189 mg/dL



   Very High: >189 mg/dL

 

 28  Reference Range and Interpretation:



   TnI (ng/mL)             Interpretation



   Less Than 0.03 ng/mL    Not supportive of diagnosis of MI



   0.03 - 0.50 ng/mL       Indeterminate: suggest serial



   studies if clinically indicated.



   Greater than 0.5 ng/mL  Consistent with diagnosis of MI

 

 29  >100 to <200 pg/mL: likely compensated congestive heart



   failure (CHF)



   200 to 400 pg/mL: likely moderate CHF



   >400 pg/mL: likely moderate to severe CHF

 

 30  PT IS FASTING

 

 31  Desirable <150



   Borderline high 150-199



   High 200-499



   Very High >500

 

 32  Desirable <200



   Borderline high 200-239



   High >239

 

 33  Low <40



   Desirable: 40-60



   High: >60

 

 34  Desirable <100



   Near Optimal 100-129



   Borderline high 130-159



   High 160-189



   Very High >189

 

 35  **Potassium reference range changed effective 14**

 

 36  *******Because ethnic data is not always readily available,



   this report includes an eGFR for both -Americans and



   non- Americans.****



   The National Kidney Disease Education Program (NKDEP) does



   not endorse the use of the MDRD equation for patients that



   are not between the ages of 18 and 70, are pregnant, have



   extremes of body size, muscle mass, or nutritional status,



   or are non- or non-.



   According to the National Kidney Foundation, irrespective of



   diagnosis, the stage of the disease is based on the level of



   kidney function:



   Stage Description                      GFR(mL/min/1.73 m(2))



   1     Kidney damage with normal or decreased GFR       90



   2     Kidney damage with mild decrease in GFR          60-89



   3     Moderate decrease in GFR                         30-59



   4     Severe decrease in GFR                           15-29



   5     Kidney failure                       <15 (or dialysis)

 

 37  Comment: collected by micheline romano

 

 38  RUN DATE: 05/15/14               [facility] LAB **LIVE**       
         PAGE    1



   RUN TIME: 0900             [address] [place] [address]   [address]



   Specimen Inquiry



   



   -----------------------------------------------------------------------------
---------------



   Name:  SHIELA SHEETS BLAKE               : 1930    Attend Dr: Galdino Heredia MD



   Acct:  [id number]  Unit: [id number]  AGE: 83            Location:  [facility]



   Re14                        SEX: F             Status:    DEP ER



   -----------------------------------------------------------------------------
---------------



   



   SPEC: [id number]         ALEAH:       [facility] DR: Galdino Heredia MD



   REQ:  [id number]              RECD:   



   STATUS: COMP             [facility] DR: Tracy Carlin MD



   _



   SOURCE: THROAT         SPDESC:



   ORDERED:  Throat Beta Str



   COMMENTS: Comment: collected by micheline romano



   



   -----------------------------------------------------------------------------
---------------



   Procedure                         Result                         Verified   
        Site



   -----------------------------------------------------------------------------
---------------



   Throat Beta Strep Culture  Final                                 05/15/14-
0900      ML



   Negative For Group A Beta Streptococcus



   



   -----------------------------------------------------------------------------
---------------



   



   



   



   



   



   



   



   



   



   



   



   



   



   



   



   



   



   



   



   



   



   



   



   



   



   



   



   



   ** END OF REPORT **



   



   * ML=Testing performed at Main Lab



   DEPARTMENT OF PATHOLOGY,  [place], [place] [address]



   Phone # [phone]      Fax #[phone]



   Onel Hernandez M.D. Director     [facility] # [id number]

 

 39  RUN DATE: 14               [facility] LAB **LIVE**       
         PAGE    1



   RUN TIME: 1357             [employer] [place], [place]   [address]



   Specimen Inquiry



   



   -----------------------------------------------------------------------------
---------------



   Name:  SHIELA SHEETS               : 1930    Attend Dr: Mario Bergman MD



   Acct:  [id number]  Unit: [id number]  AGE: 83            Location:  OR



   Re14                        SEX: F             Status:    REG SDC



   -----------------------------------------------------------------------------
---------------



   



   SPEC: [id number]             ALEAH: 14-          SUBM DR: Mario Bergman MD



   REQ:  [id number]             RECD: 150



   STATUS: DEBI SPARKS DR: Tracy Carlin MD



   _



   ORDERED:  CONSULT W/ FS, FS ADDITIONAL, LEVEL IV



   



   FINAL DIAGNOSIS



   



   Skin, right upper cutaneous lip, biopsy:



   A. Basal cell carcinoma, nodular type.



   B. Deep, tip, and lateral margins of resection are clear.



   



   



   



   



   



   



   PATHOLOGY SURGICAL CONSULT



   



   Frozen section (FS)/Touch Prep (TP)/Gross Consult (GC)



   



   FS: Right upper cutaneous lip:



   A. Basal cell carcinoma (DS).



   B. Margins clear (DS).



   



   



   PRE-OPERATIVE DIAGNOSIS



   



   Basal cell carcinoma right upper cutaneous lip, suture marks twelve o'clock 
superior margin.



   



   GROSS DESCRIPTION



   



   The specimen is received fresh labeled Shiela Sheets, Excision BCC 
Right Upper



   Cutaneous Lip Suture Marks Twelve O'clock Superior Margin and consists of a 
2.1 x 0.8 cm.



   tan-pink skin ellipse excised to a depth of 0.5 cm.  There is a suture 
attached to one long



   axis which designates Twelve o'clock.  The specimen is inked as follows: 
nine o'clock half -



   black, three o'clock half - blue, and twelve o'clock tip - green, serially 
sectioned from



   twelve o'clock to six o'clock and entirely submitted for frozen section 
microscopy in



   cassettes FSA and FSB.  The frozen section residue is entirely submitted in 
cassettes FSA



   and FSB to include ellipse ends in cassette FSA.



   



   Signed __________(signature on file)___________ Onel Hernandez MD  [id number]



   -----------------------------------------------------------------------------
---------------



   



   ** END OF REPORT **



   



   * ML=Testing performed at Main Lab



   DEPARTMENT OF PATHOLOGY,  [facility] [address] [place], [place] [address]



   Phone # [phone]      Fax #[phone]



   Onel Hernandez M.D. Director     [place] [employer]  #[id number]

 

 40  RUN DATE: 04/15/14               [facility] **LIVE**       
         PAGE    1



   RUN TIME: 1224             [facility] [address] [place], [place]   [address]



   Specimen Inquiry



   



   -----------------------------------------------------------------------------
---------------



   Name:  SHIELA SHEETS               : 1930    Attend Dr: Mario Bergman MD



   Acct:  [id number]  Unit: [id number]  AGE: 83            Location:  [facility]



   Re/15/14                        SEX: F             Status:    REG REF



   -----------------------------------------------------------------------------
---------------



   



   SPEC: [id number]             ALEAH: 04/15/14-          [facility] DR: Mario Bergman MD



   REQ:  [id number]             RECD: 04/15/



   STATUS: DEBI SPARKS DR: Tracy Ling MD



   _



   ORDERED:  Consult w/slide



   



   FINAL DIAGNOSIS



   



   Skin, right nasolabial fold, shave biopsy:



   A. Basal cell carcinoma, nodular type.



   B. Basal cell carcinoma is broadly transected along the base of this shave 
biopsy.



   



   



   



   



   



   



   PRE-OPERATIVE DIAGNOSIS



   



   Basal cell carcinoma.



   



   GROSS DESCRIPTION



   



   Received is one glass slide labeled [id number], Shiela Sheets, Bart .
  Accompanying



   this slide is a pathology report labeled Tracy Sheets, [id number] ZEALER,



   [address] [place] [address], [address], [place] [address] signed by Dr. Stuart Doss on



   2014.



   



   Signed __________(signature on file)___________ Onel Hernandez MD 04/15/
14 1518



   



   -----------------------------------------------------------------------------
---------------



   



   



   



   



   



   



   



   



   



   



   



   ** END OF REPORT **



   



   * ML=Testing performed at Main Lab



   DEPARTMENT OF PATHOLOGY,  [address] [place] [address]



   Phone # [phone]      Fax #[phone]



   Onel Hernandez M.D. Director     [place] [employer]  #[id number]

 

 41  HDL Interpretation:



   Undesirable: High Risk:  Less than 40 mg/dL



   Desirable:  Low Risk:  Greater than 60 mg/dL

 

 42  LDL Interpretation:



   Low Risk Optimal Level:  LDL Less than 100 mg/dL



   Near or Above Optimal:  -129 mg/dL



   Borderline High Risk:  -159 mg/dL



   High Risk:  -189 mg/dL



   Very High Risk:  LDL Greater than 189 mg/dL

 

 43  *******Because ethnic data is not always readily available,



   this report includes an eGFR for both -Americans and



   non- Americans.****



   The National Kidney Disease Education Program (NKDEP) does



   not endorse the use of the MDRD equation for patients that



   are not between the ages of 18 and 70, are pregnant, have



   extremes of body size, muscle mass, or nutritional status,



   or are non- or non-.



   According to the National Kidney Foundation, irrespective of



   diagnosis, the stage of the disease is based on the level of



   kidney function:



   Stage Description                      GFR(mL/min/1.73 m(2))



   1     Kidney damage with normal or decreased GFR       90



   2     Kidney damage with mild decrease in GFR          60-89



   3     Moderate decrease in GFR                         30-59



   4     Severe decrease in GFR                           15-29



   5     Kidney failure                       <15 (or dialysis)

 

 44  *******Because ethnic data is not always readily available,



   this report includes an eGFR for both -Americans and



   non- Americans.****



   The National Kidney Disease Education Program (NKDEP) does



   not endorse the use of the MDRD equation for patients that



   are not between the ages of 18 and 70, are pregnant, have



   extremes of body size, muscle mass, or nutritional status,



   or are non- or non-.



   According to the National Kidney Foundation, irrespective of



   diagnosis, the stage of the disease is based on the level of



   kidney function:



   Stage Description                      GFR(mL/min/1.73 m(2))



   1     Kidney damage with normal or decreased GFR       90



   2     Kidney damage with mild decrease in GFR          60-89



   3     Moderate decrease in GFR                         30-59



   4     Severe decrease in GFR                           15-29



   5     Kidney failure                       <15 (or dialysis)

 

 45  PT IS FASTING

 

 46  PT IS FASTING

 

 47  PT IS FASTING

 

 48  PT IS FASTING

 

 49  Desirable:  Less than 200 MG/DL



   Borderline-High Risk:  200-239 MG/DL



   High-Risk:  240 MG/DL and over

 

 50  HDL Interpretation:



   Undesirable: High Risk:  Less than 40 MG/DL



   Desirable:  Low Risk:  Greater than 60 MG/DL

 

 51  Anion gap measurement may be of limited value in the



   presence of any alkalosis, especially in a combined acid



   base disorder.



   .

 

 52  A metabolite of Naproxen, O-desmethylnaproxen, has been



   shown to interfere with the Jendrassik-[place] method for



   measuring total bilirubin.  Samples from patients who have



   taken Naproxen have shown spurious elevation in total



   bilirubin levels.

 

 53  *******Because ethnic data is not always readily available,



   this report includes an eGFR for both -Americans and



   non- Americans.****



   The National Kidney Disease Education Program (NKDEP) does



   not endorse the use of the MDRD equation for patients that



   are not between the ages of 18 and 70, are pregnant, have



   extremes of body size, muscle mass, or nutritional status,



   or are non- or non-.



   According to the National Kidney Foundation, irrespective of



   diagnosis, the stage of the disease is based on the level of



   kidney function:



   Stage Description                      GFR(mL/min/1.73 m(2))



   1     Kidney damage with normal or decreased GFR       90



   2     Kidney damage with mild decrease in GFR          60-89



   3     Moderate decrease in GFR                         30-59



   4     Severe decrease in GFR                           15-29



   5     Kidney failure                       <15 (or dialysis)

 

 54  CHOLESTEROL INTERPRETATION:



   Desirable:  Less than 200 MG/DL



   Borderline-High Risk:  200-239 MG/DL



   High-Risk:  240 MG/DL and over

 

 55  HDL INTERPRETATION:



   Undesirable: High Risk:  Less than 40 MG/DL



   Desirable:  Low Risk:  Greater than 60 MG/DL

 

 56  LDL INTERPRETATION:



   Low Risk Optimal Level:  LDL Less than 100 MG/DL



   Near or Above Optimal:  -129 MG/DL



   Borderline High Risk:  -159 MG/DL



   High Risk:  -189 MG/DL



   Very High Risk:  LDL Greater than 189 MG/DL

 

 57  CHOLESTEROL INTERPRETATION:



   Desirable:  Less than 200 MG/DL



   Borderline-High Risk:  200-239 MG/DL



   High-Risk:  240 MG/DL and over

 

 58  HDL INTERPRETATION:



   Undesirable: High Risk:  Less than 40 MG/DL



   Desirable:  Low Risk:  Greater than 60 MG/DL

 

 59  LDL INTERPRETATION:



   Low Risk Optimal Level:  LDL Less than 100 MG/DL



   Near or Above Optimal:  -129 MG/DL



   Borderline High Risk:  -159 MG/DL



   High Risk:  -189 MG/DL



   Very High Risk:  LDL Greater than 189 MG/DL

 

 60  Anion gap measurement may be of limited value in the



   presence of any alkalosis, especially in a combined acid



   base disorder.



   .

 

 61  A metabolite of Naproxen, O-desmethylnaproxen, has been



   shown to interfere with the Jendrassik-[place] method for



   measuring total bilirubin.  Samples from patients who have



   taken Naproxen have shown spurious elevation in total



   bilirubin levels.

 

 62  *******Because ethnic data is not always readily available,



   this report includes an eGFR for both -Americans and



   non- Americans.****



   The National Kidney Disease Education Program (NKDEP) does



   not endorse the use of the MDRD equation for patients that



   are not between the ages of 18 and 70, are pregnant, have



   extremes of body size, muscle mass, or nutritional status,



   or are non- or non-.



   According to the National Kidney Foundation, irrespective of



   diagnosis, the stage of the disease is based on the level of



   kidney function:



   Stage Description                      GFR(mL/min/1.73 m(2))



   1     Kidney damage with normal or decreased GFR       90



   2     Kidney damage with mild decrease in GFR          60-89



   3     Moderate decrease in GFR                         30-59



   4     Severe decrease in GFR                           15-29



   5     Kidney failure                       <15 (or dialysis)

 

 63  Lymphopenia %

 

 64  Anion gap measurement may be of limited value in the



   presence of any alkalosis, especially in a combined acid



   base disorder.



   .

 

 65  A metabolite of Naproxen, O-desmethylnaproxen, has been



   shown to interfere with the Jendrassik-[place] method for



   measuring total bilirubin.  Samples from patients who have



   taken Naproxen have shown spurious elevation in total



   bilirubin levels.

 

 66  *******Because ethnic data is not always readily available,



   this report includes an eGFR for both -Americans and



   non- Americans.****



   The National Kidney Disease Education Program (NKDEP) does



   not endorse the use of the MDRD equation for patients that



   are not between the ages of 18 and 70, are pregnant, have



   extremes of body size, muscle mass, or nutritional status,



   or are non- or non-.



   According to the National Kidney Foundation, irrespective of



   diagnosis, the stage of the disease is based on the level of



   kidney function:



   Stage Description                      GFR(mL/min/1.73 m(2))



   1     Kidney damage with normal or decreased GFR       90



   2     Kidney damage with mild decrease in GFR          60-89



   3     Moderate decrease in GFR                         30-59



   4     Severe decrease in GFR                           15-29



   5     Kidney failure                       <15 (or dialysis)

 

 67  FINAL: NO GROWTH DAY 2 (<1,000 CFU/mL)

 

 68  PLEASE NOTE NEW REFERENCE RANGE.

 

 69  Lymphopenia %

 

 70  Anion gap measurement may be of limited value in the



   presence of any alkalosis, especially in a combined acid



   base disorder.



   .

 

 71  A metabolite of Naproxen, O-desmethylnaproxen, has been



   shown to interfere with the Jendrassik-[place]ali method for



   measuring total bilirubin.  Samples from patients who have



   taken Naproxen have shown spurious elevation in total



   bilirubin levels.

 

 72  *******Because ethnic data is not always readily available,



   this report includes an eGFR for both -Americans and



   non- Americans.****



   The National Kidney Disease Education Program (NKDEP) does



   not endorse the use of the MDRD equation for patients that



   are not between the ages of 18 and 70, are pregnant, have



   extremes of body size, muscle mass, or nutritional status,



   or are non- or non-.



   According to the National Kidney Foundation, irrespective of



   diagnosis, the stage of the disease is based on the level of



   kidney function:



   Stage Description                      GFR(mL/min/1.73 m(2))



   1     Kidney damage with normal or decreased GFR       90



   2     Kidney damage with mild decrease in GFR          60-89



   3     Moderate decrease in GFR                         30-59



   4     Severe decrease in GFR                           15-29



   5     Kidney failure                       <15 (or dialysis)

 

 73  Anion gap measurement may be of limited value in the



   presence of any alkalosis, especially in a combined acid



   base disorder.



   .

 

 74  ** Note change in reference range as of 08.  The



   change was based on recommendations from the American



   Diabetes Association.

 

 75  Please note change in reference range effective 08



   .

 

 76  A metabolite of Naproxen, O-desmethylnaproxen, has been



   shown to interfere with the Jendrassik-Anali method for



   measuring total bilirubin.  Samples from patients who have



   taken Naproxen have shown spurious elevation in total



   bilirubin levels.

 

 77  *******Because ethnic data is not always readily available,



   this report includes an eGFR for both -Americans and



   non- Americans.****



   The National Kidney Disease Education Program (NKDEP) does



   not endorse the use of the MDRD equation for patients that



   are not between the ages of 18 and 70, are pregnant, have



   extremes of body size, muscle mass, or nutritional status,



   or are non- or non-.



   According to the National Kidney Foundation, irrespective of



   diagnosis, the stage of the disease is based on the level of



   kidney function:



   Stage Description                      GFR(mL/min/1.73 m(2))



   1     Kidney damage with normal or decreased GFR       90



   2     Kidney damage with mild decrease in GFR          60-89



   3     Moderate decrease in GFR                         30-59



   4     Severe decrease in GFR                           15-29



   5     Kidney failure                       <15 (or dialysis)

 

 78  CHOLESTEROL INTERPRETATION:



   Desirable:  Less than 200 MG/DL



   Borderline-High Risk:  200-239 MG/DL



   High-Risk:  240 MG/DL and over

 

 79  HDL INTERPRETATION:



   Undesirable: High Risk:  Less than 40 MG/DL



   Desirable:  Low Risk:  Greater than 60 MG/DL

 

 80  LDL INTERPRETATION:



   Low Risk Optimal Level:  LDL Less than 100 MG/DL



   Near or Above Optimal:  -129 MG/DL



   Borderline High Risk:  -159 MG/DL



   High Risk:  -189 MG/DL



   Very High Risk:  LDL Greater than 189 MG/DL







Procedures







 Date  Code  Description  Status

 

 2019  05653  EKG Tracing & Interpretation  Completed

 

 2019  60374  ECHO Transthoracic, Real-Time 2D With Doppler And  Completed



     Color Flow  

 

 2019  92638  ECHO Transthoracic, Real-Time 2D With Doppler And  Completed



     Color Flow  

 

 2019  59768  Inj/Aspir Major JT Or Bursa W/ US  Completed

 

 2018  [id number]  Diabetic Retinal Eye Exam  Completed

 

 04/10/2018  23405  ECHO Transthoracic, Real-Time 2D With Doppler And  Completed



     Color Flow  

 

 04/10/2018  33794  ECHO Transthoracic, Real-Time 2D With Doppler And  Completed



     Color Flow  

 

 2017  62830  Destruction Of Benign Lesions Any Method 1-14 lesions  
Completed

 

 2017  91231  Destruction Of Benign Lesions Any Method 1-14 lesions  
Completed

 

 2017  [id number]  Bone Mineral Density Test  Completed

 

 2017  [id number]  Diabetic Retinal Eye Exam  Completed

 

 2016  95445  Stress Test  Completed

 

 2016  24247  Myocardial Perfusion Imaging Tomographic (Spect)  Completed



     Multiple Studies  

 

 2016  16992  Myocardial Perfusion Imaging Tomographic (Spect)  Completed



     Multiple Studies  

 

 2015  81834  EKG Tracing & Interpretation  Completed

 

 2015  [id number]  Mammogram  Completed

 

 2015  [id number]  Bone Mineral Density Test  Completed

 

 2014  55675  ECHO Transthoracic, Real-Time 2D With Doppler And  Completed



     Color Flow  

 

 10/24/2013  [id number]  Mammogram  Completed

 

 10/23/2012  340183807  Bone Mineral Density Test  Completed

 

 10/23/2012  [id number]  Mammogram  Completed

 

 10/18/2011  [id number]  Mammogram  Completed

 

 10/04/2011  83086  Treadmill Interp/Report Only  Completed

 

 10/04/2011  54249  Stress Test Supervsn W/Out I/R  Completed

 

 2011  45281  EKG Tracing & Interpretation  Completed

 

 2011  92289  Noninvasive Ear Or Pulse Oximetry For Oxygen  Completed



     Saturation  

 

 2011  07203  ECHO Transthoracic, Real-Time 2D With Doppler And  Completed



     Color Flow  

 

 2010  [id number]  Mammogram  Completed

 

 2009  669188660  Bone Mineral Density Test  Completed

 

 2009  31857  EKG Tracing & Interpretation  Completed

 

 2008  81430  EKG Tracing & Interpretation  Completed

 

 2008  86481  EKG Tracing & Interpretation  Completed

 

 2001  14231663  Colonoscopy  Completed







Encounters







 Type  Date  Location  Provider  Dx  Diagnosis

 

 Office Visit  2019  [facility]  Amador CASTILLO  Z01.810  Encounter for



   3:00p  Of James Diaz DO FACC    preprocedural



           cardiovascular



           examination









 I35.0  Nonrheumatic aortic (valve) stenosis

 

 I10  Essential (primary) hypertension

 

 I63.9  Cerebral infarction, unspecified

 

 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









 Office Visit  2019 10:30a  [facility]  Radha Collado,  M25.552  
Pain in left



     Of C.M.A.  M.D.    hip









 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









 Office Visit  2019  2:40p  [facility]  Tracy  I10  Essential (
primary)



     Medicine -  LAURA Carlin    hypertension



     [place]      









 M25.552  Pain in left hip

 

 I35.0  Nonrheumatic aortic (valve) stenosis









 Office Visit  2019  1:30p  [facility]  Radha Collado,  M25.552  
Pain in left



     Of C.M.A.  M.D.    hip









 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









 Office Visit  2018  9:00a  [facility]  Tracy Cotton,  M25.552  
Pain in left



     Medicine  M.D.    hip









 I10  Essential (primary) hypertension









 Office Visit  2018  3:00p  [facility]  Tracy  Z00.00  Encntr for



     Janet Carlin M.D.    general adult



           medical exam



           w/o abnormal



           findings









 M54.9  Dorsalgia, unspecified









 Office Visit  07/10/2018 10:20a  [facility]  Mike Vicente MD  L72.0  
Epidermal cyst









 L82.1  Other seborrheic keratosis

 

 Z08  Encntr for follow-up exam after trtmt for malignant neoplasm

 

 Z85.828  Personal history of other malignant neoplasm of skin









 Office Visit  2018  9:40a  [facility]  Tracy  M54.9  Dorsalgia,



     Janet Carlin M.D.    unspecified









 M25.552  Pain in left hip

 

 I10  Essential (primary) hypertension

 

 E78.5  Hyperlipidemia, unspecified









 Office Visit  2018 10:20a  [facility]  Tracy  I10  Essential (
primary)



     Janet Carlin M.D.    hypertension









 J06.9  Acute upper respiratory infection, unspecified

 

 I34.0  Nonrheumatic mitral (valve) insufficiency









 Office Visit  2017 10:30a  [facility]  Mike Vicente  L82.1  Other 
seborrheic



       MD    keratosis









 I78.8  Other diseases of capillaries

 

 R60.0  Localized edema

 

 B07.0  Plantar wart

 

 Z78.9  Other specified health status

 

 R20.8  Other disturbances of skin sensation

 

 S90.921A  Unspecified superficial injury of right foot, init encntr









 Office Visit  2017 10:00a  [facility]  Tracy  I10  Essential (
primary)



     Medicine  LAURA Carlin    hypertension









 K62.5  Hemorrhage of anus and rectum

 

 R35.0  Frequency of micturition

 

 K64.4  Residual hemorrhoidal skin tags









 Office Visit  2016 10:00a  [facility]  Tracy  I10  Essential (
primary)



     Medicine  LAURA Carlin    hypertension









 D23.9  Other benign neoplasm of skin, unspecified

 

 G47.00  Insomnia, unspecified









 Office Visit  10/31/2016 10:40a  [facility]  Donavon Araiza NP  J01.00  Acute 
maxillary



     Medicine      sinusitis,



           unspecified

 

 Office Visit  2016  4:20p  [facility]  Lisseth Hardy  J01.00  Acute 
maxillary



     Medicine  N.P.    sinusitis,



           unspecified

 

 Office Visit  2016  4:20p  [facility]  Lisseth Hardy  J01.00  Acute 
maxillary



     Medicine  N.P.    sinusitis,



           unspecified

 

 Office Visit  2016  2:00p  [facility]  Radha Collado,  M16.11  Unilateral



     Services Of  M.D.    primary



     C.M.A.      osteoarthritis,



           right hip









 M17.0  Bilateral primary osteoarthritis of knee

 

 M25.462  Effusion, left knee

 

 M25.562  Pain in left knee

 

 M25.461  Effusion, right knee

 

 M25.561  Pain in right knee

 

 M54.5  Low back pain









 Office Visit  2016  2:20p  [facility]  Tracy Carlin  M25.562  
Pain in left



     Medicine  M.D.    knee









 M25.561  Pain in right knee

 

 M25.551  Pain in right hip









 Office Visit  2015  9:40a  [facility]  Tracy  M85.9  Disorder of 
bone



     Medicine  LAURA Carlin    density and



           structure,



           unspecified









 R06.02  Shortness of breath

 

 K21.9  Gastro-esophageal reflux disease without esophagitis

 

 E78.5  Hyperlipidemia, unspecified









 Office Visit  2015 10:20a  [facility]  Donavon Araiza,  R06.02  Shortness 
of



     Medicine  NP    breath









 R07.9  Chest pain, unspecified

 

 R14.2  Eructation









 Office Visit  2015 10:00a  [facility]  Donavon Jose G, NP  784.91  
Postnasal Drip



     Medicine      









 466.0  Bronchitis Acute

 

 786.2  Cough









 Office  2015  [facility]  Donavon Araiza, NP  466.0  Bronchitis Acute



 Visit  9:00a  Medicine      

 

 Office  2015  [facility]  Lisseth Hardy,  788.43  Nocturia



 Visit  1:00p  Medicine  N.P.    

 

 Office  2014  [facility]  Tracy  272.0  Hypercholesterolemia Pure



 Visit  10:00a  Janet Carlin M.D.    









 719.44  Pain Joint Hand

 

 424.0  Mitral Valve Disorder









 Office Visit  2014  1:00p  [facility]  Tracy  V70.0  Examination



     Medicine  LAURA Carlin    General Medical



           Routine AT Health



           Care Facility









 401.1  Hypertension Benign

 

 272.0  Hypercholesterolemia Pure

 

 437.8  Cerebrovascular Disease Other

 

 424.1  Aortic Valve Disorder









 Office Visit  2014 11:40a  [facility]  Lisseth Hardy,  724.5  Backache 
Unspec



     Medicine  N.P.    

 

 Office Visit  10/17/2013  1:00p  [facility]  [place]  401.1  Hypertension



     Janet Carlin M.D.    Benign









 780.52  Insomnia Unspecified

 

 V04.81  Need For Prophylactic Vaccination & Inoculation/Influenza









 Office Visit  2013  4:20p  [facility]  Yoanna Cohen,  789.04  Pain 
Abdominal



     Medicine  M.D., FACP    Left Lower



           [place]

 

 Office Visit  2013  2:40p  [facility]  Tr[place]  V70.0  Examination



     Janet Carlin M.D.    General Medical



           Routine AT Health



           Care Facility









 272.0  Hypercholesterolemia Pure

 

 401.1  Hypertension Benign

 

 V76.10  Screening For Malignant Neoplasm Breast









 Office Visit  2012  9:30a  [facility]  Nurse Visit A  401.1  
Hypertension



     Medicine      Benign

 

 Office Visit  10/16/2012 10:00a  [facility]  Tracy  401.1  Hypertension



     Janet Carlin M.D.    Benign









 782.0  Skin Sensation Disturbance

 

 272.0  Hypercholesterolemia Pure









 Office Visit  2012 10:20a  [facility]  Tr[place]  V70.0  Examination



     Janet Carlin M.D.    General Medical



           Routine AT Health



           Care Facility









 272.0  Hypercholesterolemia Pure

 

 724.2  Lumbago

 

 V76.10  Screening For Malignant Neoplasm Breast

 

 V82.81  Special Screening For Osteoporosis

 

 V12.50  History Personal Circulatory Diseases Unspec









 Office Visit  2012 11:20a  [facility]  Tracy  729.5  Pain In Limb



     Medicine  LAURA Carlin    

 

 Office Visit  10/04/2011 10:30a  [facility]  Steve GRACE  786.50  Pain 
Chest



       LAURA Alonso    Unspec









 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

 

 401.1  Hypertension Benign









 Office Visit  2011  DO Not Use  [place]  786.05  Shortness Of



   1:20p  Denise Carlin M.D.    Breath









 v04.81  Need For Prophylactic Vaccination & Inoculation/Influenza









 Office Visit  2011  DO Not Use  [place]  401.1  Hypertension



   10:20a  Denise Carlin M.D.    Benign









 272.0  Hypercholesterolemia Pure

 

 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

 

 V76.10  Screening For Malignant Neoplasm Breast









 Office Visit  2011  DO Not Use  Lisseth Hardy,  680.8  Carbuncle &



   11:00a  [facility]  N.P.    Furuncle Spec



           Sites Other

 

 Office Visit  2011  DO Not Use  [place]  401.1  Hypertension



   10:30a  Denise Carlin M.D.    Benign

 

 Office Visit  2011  DO Not Use  [place]  789.00  Pain Abdominal



   11:30a  Denise Carlin M.D.    Unspec Site

 

 Office Visit  2010  DO Not Use  [place]  V70.0  Examination



   10:45a  Denise Carlin M.D.    General Medical



           Routine AT Health



           Care Facility









 401.1  Hypertension Benign

 

 272.0  Hypercholesterolemia Pure

 

 569.49  Rectum & Anus Disorder Other









 Office Visit  2010  DO Not Use  [place] e,  465.9  URI  Upper



   2:45p  Denise Appiah M.D.    Respiratory



           Infections Acute



           Unspec Sites









 786.2  Cough

 

 401.1  Hypertension Benign









 Office Visit  2009  DO Not Use  Lisseth Hardy,  466.0  Bronchitis Acute



   2:45p  [facility]  [facility]    

 

 Office Visit  2009  DO Not Use  [place],  V70.0  Examination



   2:30p  Denise Appiah M.D.    General Medical



           Routine AT Health



           Care Facility









 729.5  Pain In Limb

 

 401.1  Hypertension Benign

 

 272.0  Hypercholesterolemia Pure

 

 443.9  Peripheral Vascular Disease Unspec

 

 V04.81  Need For Prophylactic Vaccination & Inoculation/Influenza









 Office Visit  2009  DO Not Use  Lisseth Hardy,  727.43  Ganglion Unspec



   4:15p  James-Amy  N.PNavya    

 

 Office Visit  2009  DO Not Use  [place],  443.9  Peripheral



   2:45p  Denise Appiah M.D.    Vascular Disease



           Unspec









 715.94  Osteoarthrosis Unspec Genlzd Or Localized Hand

 

 401.1  Hypertension Benign









 Office Visit  2009  DO Not Use  [place],  V72.84  Examination



   3:15p  Denise Appiah M.D.    Preoperative



           Unspec









 443.9  Peripheral Vascular Disease Unspec

 

 401.1  Hypertension Benign









 Office  2008  DO Not Use  [place],  272.0  Hypercholesterolemia



 Visit  2:45p  Denise Appiah M.D.    Pure









 443.9  Peripheral Vascular Disease Unspec

 

 311  Depressive Disorder Not Elsewhere Spec









 Office Visit  2008  DO Not Use  [place],  443.9  Peripheral



   10:45a  Denise Appiah M.D.    Vascular Disease



           Unspec









 780.79  Malaise And Fatigue Other

 

 401.1  Hypertension Benign

 

 311  Depressive Disorder Not Elsewhere Spec

 

 V70.0  Examination General Medical Routine AT Health Care Facility









 Office Visit  2008  DO Not Use  [place],  272.4  Hyperlipidemia



   2:45p  Denise Appiah M.D.    Other Unspec









 401.1  Hypertension Benign









 Office  2008  DO Not Use  [place],  272.0  Hypercholesterolemia



 Visit  2:30p  Denise Appiah M.D.    Pure









 443.9  Peripheral Vascular Disease Unspec

 

 401.1  Hypertension Benign









 Office Visit  2007  DO Not Use  [place],  401.1  Hypertension



   2:45p  Denise Appiah M.D.    Benign









 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

 

 272.0  Hypercholesterolemia Pure

 

 V70.0  Examination General Medical Routine AT Health Care Facility









 Office Visit  2007  DO Not Use  [place],  401.1  Hypertension



   2:45p  Denise Appiah M.D.    Benign









 528.9  Oral Soft Tissue Diseases Other & Unspec

 

 272.0  Hypercholesterolemia Pure









 Office Visit  2006  DO Not Use  [place],  V72.31  Routine Gyn



   2:15p  Denise Appiah M.D.    Examination







Plan of Treatment

Future Appointment(s):2019 10:00 am - Radha [facility]2019  8:40 am - Tracy Carlin M.D. at [facility] 
Kwowshrr42/11/2019  1:30 pm - Radha Collado M.D. at [facility]2019  3:00 pm - Tracy Carlin M.D. at [facility] [id number] - Radha Collado M.D.M25.552 Pain in left hipFollow up:Follow up:   2 
weeks after [id number] Unilateral primary osteoarthritis, left hip

## 2019-06-12 LAB
ANION GAP SERPL CALC-SCNC: 6 MMOL/L (ref 2–11)
BUN SERPL-MCNC: 15 MG/DL (ref 6–24)
BUN/CREAT SERPL: 24.6 (ref 8–20)
CALCIUM SERPL-MCNC: 8.6 MG/DL (ref 8.6–10.3)
CHLORIDE SERPL-SCNC: 107 MMOL/L (ref 101–111)
GLUCOSE SERPL-MCNC: 148 MG/DL (ref 70–100)
HCO3 SERPL-SCNC: 24 MMOL/L (ref 22–32)
HCT VFR BLD AUTO: 33 % (ref 35–47)
HGB BLD-MCNC: 11 G/DL (ref 12–16)
PLATELET # BLD AUTO: 167 10^3/UL (ref 150–450)
POTASSIUM SERPL-SCNC: 4.3 MMOL/L (ref 3.5–5)
SODIUM SERPL-SCNC: 137 MMOL/L (ref 135–145)

## 2019-06-12 RX ADMIN — OXYCODONE HYDROCHLORIDE AND ACETAMINOPHEN PRN TAB: 5; 325 TABLET ORAL at 08:32

## 2019-06-12 RX ADMIN — Medication SCH CAP: at 19:49

## 2019-06-12 RX ADMIN — Medication SCH CAP: at 08:32

## 2019-06-12 RX ADMIN — DILTIAZEM HYDROCHLORIDE SCH MG: 120 CAPSULE, COATED, EXTENDED RELEASE ORAL at 08:32

## 2019-06-12 RX ADMIN — OXYCODONE HYDROCHLORIDE AND ACETAMINOPHEN PRN TAB: 5; 325 TABLET ORAL at 20:34

## 2019-06-12 RX ADMIN — APIXABAN SCH MG: 2.5 TABLET, FILM COATED ORAL at 08:32

## 2019-06-12 RX ADMIN — ROSUVASTATIN CALCIUM SCH MG: 40 TABLET, FILM COATED ORAL at 19:49

## 2019-06-12 RX ADMIN — ALUMINUM HYDROXIDE, MAGNESIUM HYDROXIDE, AND SIMETHICONE PRN ML: 200; 200; 20 SUSPENSION ORAL at 22:01

## 2019-06-12 RX ADMIN — MAGNESIUM HYDROXIDE SCH ML: 400 SUSPENSION ORAL at 19:50

## 2019-06-12 RX ADMIN — APIXABAN SCH MG: 2.5 TABLET, FILM COATED ORAL at 19:47

## 2019-06-12 RX ADMIN — ACETAMINOPHEN PRN MG: 325 TABLET ORAL at 14:22

## 2019-06-12 RX ADMIN — DOCUSATE SODIUM SCH MG: 100 CAPSULE, LIQUID FILLED ORAL at 08:32

## 2019-06-12 RX ADMIN — THERA TABS SCH TAB: TAB at 08:32

## 2019-06-12 RX ADMIN — MAGNESIUM HYDROXIDE SCH ML: 400 SUSPENSION ORAL at 08:32

## 2019-06-12 RX ADMIN — DOCUSATE SODIUM SCH MG: 100 CAPSULE, LIQUID FILLED ORAL at 19:48

## 2019-06-12 RX ADMIN — TRAVOPROST SCH DROP: 0.04 SOLUTION/ DROPS OPHTHALMIC at 20:30

## 2019-06-12 NOTE — PN
Progress Note





- Progress Note


Date of Service: 06/12/19


Note: 





Patient had indigestion, gas pain earlier, tried to get up to go to the 

bathroom and developed substernal chest pain.  EKG unremarkable.  Symptoms have 

subsided while at rest.  Will give morphine, trend her troponin.  Will also 

order maalox as I suspect this is indigestion.

## 2019-06-12 NOTE — PN
Progress Note





- Progress Note


Date of Service: 06/12/19


SOAP: 


Subjective:


[]Pt seen at bedside. Feels well, pain is well controlled. Denies CP, SOB, 

dizziness, nausea








Objective:


[]Gen: NAD, appears comfortable


LLE: left hip dressing CDI, thigh soft, DF/PF intact, DP2+, sensation intact to 

light touch distally


Calves supple and nontender without erythema, edema or palpable cords 








Assessment:


[]POD 1 sp left total hip replacement





Plan:


[]WBAT


PT/OT


Posterior hip precautions


eliquis 2.5 mg po BID x 30 days post op


PMRU referral in





 Vital Signs











Temp  97.6 F   06/12/19 07:27


 


Pulse  65   06/12/19 07:27


 


Resp  16   06/12/19 08:32


 


BP  115/51   06/12/19 07:27


 


Pulse Ox  94   06/12/19 07:27








 Intake & Output











 06/11/19 06/12/19 06/12/19





 18:59 06:59 18:59


 


Intake Total 1950 1918 581


 


Output Total  1200 200


 


Balance 1950 718 381


 


Weight 142 lb 3.17 oz  


 


Intake:   


 


  IV Fluids 1950 938 472


 


    CLINDAMYCIN 900MG 50ML 50  


 


    LR 1900 938 472


 


  IVPB   109


 


    ABX - CLINDAMYCIN   109


 


  Oral  980 


 


Output:   


 


  Urine   200


 


  Hebert  1200 


 


Other:   


 


  # Bowel Movements  0 








 Laboratory Last Values











Hgb  11.0 g/dL (12.0-16.0)  L  06/12/19  06:38    


 


Hct  33 % (35-47)  L  06/12/19  06:38    


 


Plt Count  167 10^3/uL (150-450)   06/12/19  06:38    


 


MPV  8.7 fL (7.4-10.4)   06/12/19  06:38    


 


Sodium  137 mmol/L (135-145)   06/12/19  06:38    


 


Potassium  4.3 mmol/L (3.5-5.0)   06/12/19  06:38    


 


Chloride  107 mmol/L (101-111)   06/12/19  06:38    


 


Carbon Dioxide  24 mmol/L (22-32)   06/12/19  06:38    


 


Anion Gap  6 mmol/L (2-11)   06/12/19  06:38    


 


BUN  15 mg/dL (6-24)   06/12/19  06:38    


 


Creatinine  0.61 mg/dL (0.51-0.95)   06/12/19  06:38    


 


Est GFR ( Amer)  111.7  (>60)   06/12/19  06:38    


 


Est GFR (Non-Af Amer)  92.4  (>60)   06/12/19  06:38    


 


BUN/Creatinine Ratio  24.6  (8-20)  H  06/12/19  06:38    


 


Glucose  148 mg/dL ()  H  06/12/19  06:38    


 


Calcium  8.6 mg/dL (8.6-10.3)   06/12/19  06:38

## 2019-06-13 LAB
HCT VFR BLD AUTO: 29 % (ref 35–47)
HGB BLD-MCNC: 9.8 G/DL (ref 12–16)
PLATELET # BLD AUTO: 160 10^3/UL (ref 150–450)

## 2019-06-13 RX ADMIN — DILTIAZEM HYDROCHLORIDE SCH MG: 120 CAPSULE, COATED, EXTENDED RELEASE ORAL at 09:05

## 2019-06-13 RX ADMIN — MAGNESIUM HYDROXIDE SCH ML: 400 SUSPENSION ORAL at 09:04

## 2019-06-13 RX ADMIN — APIXABAN SCH MG: 2.5 TABLET, FILM COATED ORAL at 20:32

## 2019-06-13 RX ADMIN — ACETAMINOPHEN PRN MG: 325 TABLET ORAL at 09:06

## 2019-06-13 RX ADMIN — TRAVOPROST SCH DROP: 0.04 SOLUTION/ DROPS OPHTHALMIC at 20:33

## 2019-06-13 RX ADMIN — THERA TABS SCH TAB: TAB at 09:06

## 2019-06-13 RX ADMIN — ROSUVASTATIN CALCIUM SCH MG: 40 TABLET, FILM COATED ORAL at 20:33

## 2019-06-13 RX ADMIN — ALUMINUM HYDROXIDE, MAGNESIUM HYDROXIDE, AND SIMETHICONE PRN ML: 200; 200; 20 SUSPENSION ORAL at 23:34

## 2019-06-13 RX ADMIN — Medication SCH CAP: at 09:07

## 2019-06-13 RX ADMIN — MAGNESIUM HYDROXIDE SCH: 400 SUSPENSION ORAL at 20:35

## 2019-06-13 RX ADMIN — DOCUSATE SODIUM SCH MG: 100 CAPSULE, LIQUID FILLED ORAL at 09:05

## 2019-06-13 RX ADMIN — OXYCODONE HYDROCHLORIDE AND ACETAMINOPHEN PRN TAB: 5; 325 TABLET ORAL at 17:46

## 2019-06-13 RX ADMIN — APIXABAN SCH MG: 2.5 TABLET, FILM COATED ORAL at 09:05

## 2019-06-13 RX ADMIN — OXYCODONE HYDROCHLORIDE AND ACETAMINOPHEN PRN TAB: 5; 325 TABLET ORAL at 23:27

## 2019-06-13 RX ADMIN — ACETAMINOPHEN PRN MG: 325 TABLET ORAL at 01:30

## 2019-06-13 RX ADMIN — Medication SCH CAP: at 20:33

## 2019-06-13 RX ADMIN — ASPIRIN SCH MG: 81 TABLET, COATED ORAL at 17:46

## 2019-06-13 RX ADMIN — OXYCODONE HYDROCHLORIDE PRN MG: 5 CAPSULE ORAL at 20:32

## 2019-06-13 RX ADMIN — DOCUSATE SODIUM SCH: 100 CAPSULE, LIQUID FILLED ORAL at 20:35

## 2019-06-13 NOTE — PN
Subjective


Date of Service: 06/13/19


Interval History: 





Patient reports she previously was having constipation but had a bowel movement 

this morning and had incontinence of bowel. She denies abd pain, nausea/vomiting

, chest pain, difficulty breathing, visual changes, and headaches.





Objective


Active Medications: 








Acetaminophen (Tylenol Tab*)  650 mg PO Q8H PRN


   PRN Reason: PAIN OR TEMPERATURE


   Last Admin: 06/13/19 09:06 Dose:  650 mg


Al Hydrox/Mg Hydrox/Simethicone (Maalox Plus*)  30 ml PO Q4H PRN


   PRN Reason: INDIGESTION


   Last Admin: 06/12/19 22:01 Dose:  30 ml


Apixaban (Eliquis*)  2.5 mg PO BID [facility]


   Last Admin: 06/13/19 09:05 Dose:  2.5 mg


Aspirin (Aspirin Ec Tab*)  81 mg PO QAM [facility]


Bisacodyl (Dulcolax Supp*)  10 mg HI DAILY PRN


   PRN Reason: constipation


Cyclobenzaprine HCl (Flexeril Tab*)  5 mg PO TID PRN


   PRN Reason: SPASMS


   Last Admin: 06/13/19 15:12 Dose:  5 mg


Diltiazem HCl (Cardizem Cd Cap*)  120 mg PO QAM [facility]


   Last Admin: 06/13/19 09:05 Dose:  120 mg


Diphenhydramine HCl (Benadryl Iv*)  25 mg IV Q6H PRN


   PRN Reason: itching


   Last Admin: 06/12/19 03:11 Dose:  25 mg


Docusate Sodium (Colace Cap*)  100 mg PO BID [facility]


   Last Admin: 06/13/19 09:05 Dose:  100 mg


Lactated Ringer's (Lactated Ringers 1000 Ml Bag*)  1,000 mls @ 100 mls/hr IV 

PER RATE [facility]


   Last Admin: 06/12/19 03:11 Dose:  100 mls/hr


Lactulose (Lactulose*)  30 ml PO Q6H PRN


   PRN Reason: constipation


   Last Admin: 06/13/19 12:59 Dose:  30 ml


Magnesium Hydroxide (Milk Of Magnesia Liq*)  30 ml PO BID [facility]


   Last Admin: 06/13/19 09:04 Dose:  30 ml


Magnesium Hydroxide (Milk Of Magnesia Liq*)  30 ml PO Q6H PRN


   PRN Reason: constipation


Morphine Sulfate (Morphine 4 Mg/Ml Vial (1 Ml))  2 mg IV Q2H PRN


   PRN Reason: PAIN


Multivitamins (Theragran Tab*)  1 tab PO DAILY [facility]


   Last Admin: 06/13/19 09:06 Dose:  1 tab


Multivitamins/Minerals (Preservision Areds 2)  1 cap PO BID [facility]


   Last Admin: 06/13/19 09:07 Dose:  1 cap


Ondansetron HCl (Zofran Inj*)  4 mg IV Q6H PRN


   PRN Reason: nausea


Oxycodone HCl (Roxycodone Tab*)  10 mg PO Q4H PRN


   PRN Reason: PAIN - [facility]


   Last Admin: 06/11/19 20:35 Dose:  10 mg


Oxycodone/Acetaminophen (Percocet 5/325 Tab*)  1 tab PO Q4H PRN


   PRN Reason: PAIN


   Last Admin: 06/12/19 20:34 Dose:  1 tab


Oxycodone/Acetaminophen (Percocet 5/325 Tab*)  2 tab PO Q4H PRN


   PRN Reason: PAIN


   Last Admin: 06/12/19 08:32 Dose:  2 tab


Rosuvastatin Calcium (Crestor (Nf))  40 mg PO BEDTIME [facility]


   Last Admin: 06/12/19 19:49 Dose:  40 mg


Travoprost (Travatan Z 0.004% Opth (Nf))  1 drop BOTH EYES BEDTIME [facility]; Protocol


   Last Admin: 06/12/19 20:30 Dose:  1 drop








 Vital Signs - 8 hr











  06/13/19 06/13/19 06/13/19





  11:10 15:12 16:04


 


Temperature 98.6 F  98 F


 


Pulse Rate 83  91


 


Respiratory 16 20 18





Rate   


 


Blood Pressure 118/46  139/54





(mmHg)   


 


O2 Sat by Pulse 97  98





Oximetry   











Oxygen Devices in Use Now: None


Appearance: Thin, elderly white female who appears younger than stated age, 

laying in hospital bed, appearing in NAD


Eyes: No Scleral Icterus, PERRLA


Ears/Nose/Mouth/Throat: Mucous Membranes Moist


Neck: NL Appearance and Movements; NL JVP


Respiratory: Symmetrical Chest Expansion and Respiratory Effort, Clear to 

Auscultation


Cardiovascular: RRR, - - Grade III systolic murmur


Abdominal: NL Sounds; No Tenderness; No Distention


Extremities: No Edema, No Clubbing, Cyanosis


Skin: No Rash or Ulcers


Neurological: Alert and Oriented x 3, NL Muscle Strength and Tone


Result Diagrams: 


 06/13/19 05:29





 06/12/19 06:38





Assess/Plan/Problems-Billing


Assessment: 





89 yo white female with PMHx hypertension, CVA, macular degeneration, and 

glaucoma presents for elective left total hip arthroplasty with Dr. Collado. 

Hospital medicine was consulted for co-management of chronic medical conditions.





- Patient Problems


(1) History of CVA (cerebrovascular accident)


Code(s): Z86.73 - PRSNL HX OF TIA (TIA), AND CEREB INFRC W/O RESID DEFICITS   

SNOMED Code(s): 492482323


   Comment: 


-restarting patient's home ASA 81 mg, approved by ortho surg team


-continue statin   





(2) Hypertension


Code(s): I10 - ESSENTIAL (PRIMARY) HYPERTENSION   SNOMED Code(s): 12331696


   Comment: 


-patient has been normotensive


-continue home cardizem   





(3) Macular degeneration


Code(s): H35.30 - UNSPECIFIED MACULAR DEGENERATION   SNOMED Code(s): 270886801


   Comment: 


-continue home eye drops   





(4) S/P total hip arthroplasty


Code(s): Z96.649 - PRESENCE OF UNSPECIFIED ARTIFICIAL HIP JOINT   SNOMED Code(s)

: 895314399732


   Comment: 


-elective left total hip for osteoarthritis


-management per orthopedic surgery team


-continue pain control per ortho surg


-incontinence of bowel likely due to multiple bowel regimen medications, will 

continue to monitor   





(5) DVT prophylaxis


Code(s): Z29.9 - ENCOUNTER FOR PROPHYLACTIC MEASURES, UNSPECIFIED   SNOMED Code(

s): 311857332


   Comment: 


-Eliquis per ortho surg   





(6) Full code status


Current Visit: Yes   Status: Acute   Code(s): Z78.9 - OTHER SPECIFIED HEALTH 

STATUS   SNOMED Code(s): 646420653


   


Status and Disposition: 





disposition per orthopedic surgery

## 2019-06-13 NOTE — PN
Progress Note





- Progress Note


Date of Service: 06/13/19


SOAP: 


Subjective:


[]Pt seen at bedside. Her hip is sore but tolerable. Denies CP, SOB, dizziness, 

nausea. Desires DC to [facility]. Last night she had indigestion, medicine team 

conduct EKG, trops which were unremarkable.








Objective:


[]Gen: NAD, appears comfortable


LLE: left hip dressing changed, incision CDI, thigh soft, DF/PF intact, DP2+, 

sensation intact to light touch distally


Calves supple and nontender without erythema, edema or palpable cords 








Assessment:


[]POD 2 sp left total hip replacement





Plan:


[]WBAT


PT/OT


Posterior hip precautions


eliquis 2.5 mg po BID x 30 days post op


[facility] awaiting insurance auth





 Vital Signs











Temp  98.6 F   06/13/19 11:10


 


Pulse  83   06/13/19 11:10


 


Resp  16   06/13/19 11:10


 


BP  118/46   06/13/19 11:10


 


Pulse Ox  97   06/13/19 11:10








 Intake & Output











 06/12/19 06/13/19 06/13/19





 18:59 06:59 18:59


 


Intake Total 1431 540 230


 


Output Total 200 475 200


 


Balance 1231 65 30


 


Intake:   


 


  IV Fluids 472  


 


      


 


  IVPB 109  


 


    ABX - CLINDAMYCIN 109  


 


  Oral 850 540 230


 


Output:   


 


  Urine 200 475 200


 


Other:   


 


  Estimated Void  Medium 


 


  # Bowel Movements  0 


 


  # Voids  1 








 Laboratory Last Values











Hgb  9.8 g/dL (12.0-16.0)  L  06/13/19  05:29    


 


Hct  29 % (35-47)  L  06/13/19  05:29    


 


Plt Count  160 10^3/uL (150-450)   06/13/19  05:29    


 


MPV  8.3 fL (7.4-10.4)   06/13/19  05:29    


 


Sodium  137 mmol/L (135-145)   06/12/19  06:38    


 


Potassium  4.3 mmol/L (3.5-5.0)   06/12/19  06:38    


 


Chloride  107 mmol/L (101-111)   06/12/19  06:38    


 


Carbon Dioxide  24 mmol/L (22-32)   06/12/19  06:38    


 


Anion Gap  6 mmol/L (2-11)   06/12/19  06:38    


 


BUN  15 mg/dL (6-24)   06/12/19  06:38    


 


Creatinine  0.61 mg/dL (0.51-0.95)   06/12/19  06:38    


 


Est GFR ( Amer)  111.7  (>60)   06/12/19  06:38    


 


Est GFR (Non-Af Amer)  92.4  (>60)   06/12/19  06:38    


 


BUN/Creatinine Ratio  24.6  (8-20)  H  06/12/19  06:38    


 


Glucose  148 mg/dL ()  H  06/12/19  06:38    


 


Calcium  8.6 mg/dL (8.6-10.3)   06/12/19  06:38    


 


Troponin I  0.01 ng/mL (<0.04)   06/13/19  01:18

## 2019-06-14 LAB
HCT VFR BLD AUTO: 31 % (ref 35–47)
HGB BLD-MCNC: 10.4 G/DL (ref 12–16)
PLATELET # BLD AUTO: 187 10^3/UL (ref 150–450)

## 2019-06-14 RX ADMIN — ASPIRIN SCH MG: 81 TABLET, COATED ORAL at 08:41

## 2019-06-14 RX ADMIN — THERA TABS SCH TAB: TAB at 08:42

## 2019-06-14 RX ADMIN — DILTIAZEM HYDROCHLORIDE SCH MG: 120 CAPSULE, COATED, EXTENDED RELEASE ORAL at 08:43

## 2019-06-14 RX ADMIN — OXYCODONE HYDROCHLORIDE AND ACETAMINOPHEN PRN TAB: 5; 325 TABLET ORAL at 18:33

## 2019-06-14 RX ADMIN — OXYCODONE HYDROCHLORIDE PRN MG: 5 CAPSULE ORAL at 06:38

## 2019-06-14 RX ADMIN — APIXABAN SCH MG: 2.5 TABLET, FILM COATED ORAL at 08:42

## 2019-06-14 RX ADMIN — MAGNESIUM HYDROXIDE SCH: 400 SUSPENSION ORAL at 08:43

## 2019-06-14 RX ADMIN — Medication SCH CAP: at 21:42

## 2019-06-14 RX ADMIN — MAGNESIUM HYDROXIDE SCH ML: 400 SUSPENSION ORAL at 21:45

## 2019-06-14 RX ADMIN — APIXABAN SCH MG: 2.5 TABLET, FILM COATED ORAL at 21:41

## 2019-06-14 RX ADMIN — DOCUSATE SODIUM SCH: 100 CAPSULE, LIQUID FILLED ORAL at 08:43

## 2019-06-14 RX ADMIN — TRAVOPROST SCH DROP: 0.04 SOLUTION/ DROPS OPHTHALMIC at 21:41

## 2019-06-14 RX ADMIN — Medication SCH CAP: at 08:43

## 2019-06-14 RX ADMIN — ROSUVASTATIN CALCIUM SCH MG: 40 TABLET, FILM COATED ORAL at 21:42

## 2019-06-14 RX ADMIN — DOCUSATE SODIUM SCH MG: 100 CAPSULE, LIQUID FILLED ORAL at 21:41

## 2019-06-14 NOTE — PN
Subjective


Date of Service: 06/14/19


Interval History: 





Patient resting and easily awakened at time of evaluation. Feeling well but 

reports feeling occasionally bloated with intermittent mild abd pain. Denies 

nausea/vomiting/diarrhea, fever/chills, chest pain, headaches, difficulty 

breathing.





Objective


Active Medications: 








Acetaminophen (Tylenol Tab*)  650 mg PO Q8H PRN


   PRN Reason: PAIN OR TEMPERATURE


   Last Admin: 06/13/19 09:06 Dose:  650 mg


Al Hydrox/Mg Hydrox/Simethicone (Maalox Plus*)  30 ml PO Q4H PRN


   PRN Reason: INDIGESTION


   Last Admin: 06/13/19 23:34 Dose:  30 ml


Apixaban (Eliquis*)  2.5 mg PO BID [facility]


   Last Admin: 06/14/19 08:42 Dose:  2.5 mg


Aspirin (Aspirin Ec Tab*)  81 mg PO QAM [facility]


   Last Admin: 06/14/19 08:41 Dose:  81 mg


Bisacodyl (Dulcolax Supp*)  10 mg IN DAILY PRN


   PRN Reason: constipation


Cyclobenzaprine HCl (Flexeril Tab*)  5 mg PO TID PRN


   PRN Reason: SPASMS


   Last Admin: 06/13/19 15:12 Dose:  5 mg


Diltiazem HCl (Cardizem Cd Cap*)  120 mg PO [facility]


   Last Admin: 06/14/19 08:43 Dose:  120 mg


Diphenhydramine HCl (Benadryl Iv*)  25 mg IV Q6H PRN


   PRN Reason: itching


   Last Admin: 06/12/19 03:11 Dose:  25 mg


Docusate Sodium (Colace Cap*)  100 mg PO BID [facility]


   Last Admin: 06/14/19 08:43 Dose:  Not Given


Lactated Ringer's (Lactated Ringers 1000 Ml Bag*)  1,000 mls @ 100 mls/hr IV 

PER RATE [facility]


   Last Admin: 06/12/19 03:11 Dose:  100 mls/hr


Lactulose (Lactulose*)  30 ml PO Q6H PRN


   PRN Reason: constipation


   Last Admin: 06/13/19 12:59 Dose:  30 ml


Magnesium Hydroxide (Milk Of Magnesia Liq*)  30 ml PO BID [facility]


   Last Admin: 06/14/19 08:43 Dose:  Not Given


Magnesium Hydroxide (Milk Of Magnesia Liq*)  30 ml PO Q6H PRN


   PRN Reason: constipation


Morphine Sulfate (Morphine 4 Mg/Ml Vial (1 Ml))  2 mg IV Q2H PRN


   PRN Reason: PAIN


Multivitamins (Theragran Tab*)  1 tab PO DAILY [facility]


   Last Admin: 06/14/19 08:42 Dose:  1 tab


Multivitamins/Minerals (Preservision Areds 2)  1 cap PO BID [facility]


   Last Admin: 06/14/19 08:43 Dose:  1 cap


Ondansetron HCl (Zofran Inj*)  4 mg IV Q6H PRN


   PRN Reason: nausea


Oxycodone HCl (Roxycodone Tab*)  10 mg PO Q4H PRN


   PRN Reason: PAIN - SEVERE


   Last Admin: 06/14/19 06:38 Dose:  10 mg


Oxycodone/Acetaminophen (Percocet 5/325 Tab*)  1 tab PO Q4H PRN


   PRN Reason: PAIN


   Last Admin: 06/13/19 17:46 Dose:  1 tab


Oxycodone/Acetaminophen (Percocet 5/325 Tab*)  2 tab PO Q4H PRN


   PRN Reason: PAIN


   Last Admin: 06/13/19 23:27 Dose:  2 tab


Rosuvastatin Calcium (Crestor (Nf))  40 mg PO BEDTIME [facility]


   Last Admin: 06/13/19 20:33 Dose:  40 mg


Travoprost (Travatan Z 0.004% Opth (Nf))  1 drop BOTH EYES BEDTIME [facility]; Protocol


   Last Admin: 06/13/19 20:33 Dose:  1 drop








 Vital Signs - 8 hr











  06/14/19 06/14/19 06/14/19





  07:43 08:50 08:55


 


Temperature 98.9 F  


 


Pulse Rate 99  


 


Respiratory 16 16 16





Rate   


 


Blood Pressure 114/44  





(mmHg)   


 


O2 Sat by Pulse 93  





Oximetry   














  06/14/19





  11:14


 


Temperature 99.1 F


 


Pulse Rate 96


 


Respiratory 16





Rate 


 


Blood Pressure 113/45





(mmHg) 


 


O2 Sat by Pulse 93





Oximetry 











Oxygen Devices in Use Now: None


Appearance: Elderly white female who appears younger than stated age, reclined 

in hospital chair, appearing in NAD


Eyes: No Scleral Icterus, PERRLA


Ears/Nose/Mouth/Throat: Mucous Membranes Moist


Neck: NL Appearance and Movements; NL JVP


Respiratory: Symmetrical Chest Expansion and Respiratory Effort, Clear to 

Auscultation


Cardiovascular: RRR, - - Grade II systolic murmur


Abdominal: NL Sounds; No Tenderness; No Distention


Extremities: No Edema, No Clubbing, Cyanosis


Skin: No Rash or Ulcers


Neurological: Alert and Oriented x 3, NL Muscle Strength and Tone


Result Diagrams: 


 06/14/19 05:32





 06/12/19 06:38





Assess/Plan/Problems-Billing


Assessment: 





88 yo white female with PMHx hypertension, CVA, macular degeneration, and 

glaucoma presents for elective left total hip arthroplasty with Dr. Collado. 

Hospital medicine was consulted for co-management of chronic medical conditions.





- Patient Problems


(1) History of CVA (cerebrovascular accident)


Code(s): Z86.73 - PRSNL HX OF TIA (TIA), AND CEREB INFRC W/O RESID DEFICITS   

SNOMED Code(s): 819821545


   Comment: 


-continue aspirin and statin   





(2) Hypertension


Code(s): I10 - ESSENTIAL (PRIMARY) HYPERTENSION   SNOMED Code(s): 56366554


   Comment: 


-patient has been normotensive


-continue home cardizem   





(3) Macular degeneration


Code(s): H35.30 - UNSPECIFIED MACULAR DEGENERATION   SNOMED Code(s): 748513302


   Comment: 


-continue home eye drops   





(4) S/P total hip arthroplasty


Code(s): Z96.649 - PRESENCE OF UNSPECIFIED ARTIFICIAL HIP JOINT   SNOMED Code(s)

: 544666992377


   Comment: 


-elective left total hip for osteoarthritis


-management per orthopedic surgery team


-continue pain control and bowel regimen per ortho surg


   





(5) DVT prophylaxis


Code(s): Z29.9 - ENCOUNTER FOR PROPHYLACTIC MEASURES, UNSPECIFIED   SNOMED Code(

s): 281162429


   Comment: 


-Eliquis per ortho surg   





(6) Full code status


Current Visit: Yes   Status: Acute   Code(s): Z78.9 - OTHER SPECIFIED HEALTH 

STATUS   SNOMED Code(s): 298332610


   


Status and Disposition: 





disposition per orthopedic surgery

## 2019-06-14 NOTE — PN
Progress Note





- Progress Note


Date of Service: 06/14/19


SOAP: 


Subjective:


[]Patient seen OOB in chair.  She is waiting to hear about [facility] approval.  She 

stated to me this morning that she did not feel she would be able to go home.  

She denies SOB, CP, palpitations 








Objective:


[]


 Vital Signs











Temp  98.9 F   06/14/19 07:43


 


Pulse  99   06/14/19 07:43


 


Resp  16   06/14/19 08:55


 


BP  114/44   06/14/19 07:43


 


Pulse Ox  93   06/14/19 07:43








 Intake & Output











 06/13/19 06/14/19 06/14/19





 18:59 06:59 18:59


 


Intake Total 460 475 230


 


Output Total 200 200 150


 


Balance 260 275 80


 


Intake:   


 


  Oral 460 475 230


 


Output:   


 


  Urine 200 200 150


 


Other:   


 


  Date of Last Bowel  6/14/19 





  Movement   


 


  # Bowel Movements 1 1 


 


  Estimated Stool Amount Small Small 








 Laboratory Results - last 24 hr











  06/14/19





  05:32


 


Hgb  10.4 L


 


Hct  31 L


 


Plt Count  187


 


MPV  8.6








Let hip dressings have been changed, would benign


calf NT and soft


 + DF left ankle


sensation and circulation intact distally








Assessment:


[]s/p LTH POD #3








Plan:


[]Eliquis for DVT prophylaxis


  WBAT LLE


  Posterior hip precautions


  She may need to consider SNF rehab if she is not accepted at [facility]

## 2019-06-15 LAB
HCT VFR BLD AUTO: 29 % (ref 35–47)
HGB BLD-MCNC: 9.7 G/DL (ref 12–16)
PLATELET # BLD AUTO: 190 10^3/UL (ref 150–450)

## 2019-06-15 RX ADMIN — ROSUVASTATIN CALCIUM SCH MG: 40 TABLET, FILM COATED ORAL at 20:09

## 2019-06-15 RX ADMIN — ASPIRIN SCH MG: 81 TABLET, COATED ORAL at 08:39

## 2019-06-15 RX ADMIN — MAGNESIUM HYDROXIDE SCH: 400 SUSPENSION ORAL at 08:40

## 2019-06-15 RX ADMIN — APIXABAN SCH MG: 2.5 TABLET, FILM COATED ORAL at 20:09

## 2019-06-15 RX ADMIN — Medication SCH CAP: at 20:09

## 2019-06-15 RX ADMIN — OXYCODONE HYDROCHLORIDE AND ACETAMINOPHEN PRN TAB: 5; 325 TABLET ORAL at 07:52

## 2019-06-15 RX ADMIN — APIXABAN SCH MG: 2.5 TABLET, FILM COATED ORAL at 08:39

## 2019-06-15 RX ADMIN — MAGNESIUM HYDROXIDE SCH: 400 SUSPENSION ORAL at 20:12

## 2019-06-15 RX ADMIN — TRAVOPROST SCH DROP: 0.04 SOLUTION/ DROPS OPHTHALMIC at 20:10

## 2019-06-15 RX ADMIN — DILTIAZEM HYDROCHLORIDE SCH MG: 120 CAPSULE, COATED, EXTENDED RELEASE ORAL at 08:40

## 2019-06-15 RX ADMIN — THERA TABS SCH TAB: TAB at 08:39

## 2019-06-15 RX ADMIN — DOCUSATE SODIUM SCH MG: 100 CAPSULE, LIQUID FILLED ORAL at 20:09

## 2019-06-15 RX ADMIN — OXYCODONE HYDROCHLORIDE AND ACETAMINOPHEN PRN TAB: 5; 325 TABLET ORAL at 13:05

## 2019-06-15 RX ADMIN — Medication SCH CAP: at 08:39

## 2019-06-15 RX ADMIN — OXYCODONE HYDROCHLORIDE AND ACETAMINOPHEN PRN TAB: 5; 325 TABLET ORAL at 18:55

## 2019-06-15 RX ADMIN — DOCUSATE SODIUM SCH MG: 100 CAPSULE, LIQUID FILLED ORAL at 08:39

## 2019-06-15 RX ADMIN — OXYCODONE HYDROCHLORIDE AND ACETAMINOPHEN PRN TAB: 5; 325 TABLET ORAL at 02:15

## 2019-06-15 NOTE — PN
Progress Note





- Progress Note


Date of Service: 06/15/19


SOAP: 


Subjective:


Pt is doing well.  Doing well with PT. Denies F/C, CP/SOB and calf pain.








Objective:


PE- 89 y/o WDWN F NAD, A&Ox3


LLE- dressing changed, inc c/d/i, calf soft NT, +DF/PF ankle, NVI





 Vital Signs











Temp Pulse Resp BP Pulse Ox


 


 98.7 F   93   18   135/57   94 


 


 06/15/19 07:51  06/15/19 07:51  06/15/19 08:00  06/15/19 07:51  06/15/19 08:00








 Laboratory Results - last 24 hr











  06/15/19





  05:19


 


Hgb  9.7 L


 


Hct  29 L


 


Plt Count  190


 


MPV  8.0




















Assessment:


[]s/p LTH POD #4








Plan:


[]Eliquis for DVT prophylaxis


  WBAT LLE


  Cont PT/OT


  Posterior hip precautions


  Awaiting SNF rehab placement likely Monday

## 2019-06-15 NOTE — PN
Subjective


Date of Service: 06/15/19


Interval History: 





Patient is feeling well today. Pain 5/10 in leg. Patient denies F/C, N/V, CP, 

SOB, dysuria, or other pain. Patient has been feeling constipated despite 

several small BMs over the past 2 days. Patient is anxious for discharge to 

rehab. 


Family History: Unchanged from Admission


Social History: Unchanged from Admission


Past Medical History: Unchanged from Admission





Objective


Active Medications: 








Acetaminophen (Tylenol Tab*)  650 mg PO Q8H PRN


   PRN Reason: PAIN OR TEMPERATURE


   Last Admin: 06/13/19 09:06 Dose:  650 mg


Al Hydrox/Mg Hydrox/Simethicone (Maalox Plus*)  30 ml PO Q4H PRN


   PRN Reason: INDIGESTION


   Last Admin: 06/13/19 23:34 Dose:  30 ml


Apixaban (Eliquis*)  2.5 mg PO BID [facility]


   Last Admin: 06/15/19 08:39 Dose:  2.5 mg


Aspirin (Aspirin Ec Tab*)  81 mg PO QAM [facility]


   Last Admin: 06/15/19 08:39 Dose:  81 mg


Bisacodyl (Dulcolax Supp*)  10 mg ND DAILY PRN


   PRN Reason: constipation


Cyclobenzaprine HCl (Flexeril Tab*)  5 mg PO TID PRN


   PRN Reason: SPASMS


   Last Admin: 06/13/19 15:12 Dose:  5 mg


Diltiazem HCl (Cardizem Cd Cap*)  120 mg PO QAM [facility]


   Last Admin: 06/15/19 08:40 Dose:  120 mg


Diphenhydramine HCl (Benadryl Iv*)  25 mg IV Q6H PRN


   PRN Reason: itching


   Last Admin: 06/12/19 03:11 Dose:  25 mg


Docusate Sodium (Colace Cap*)  100 mg PO BID [facility]


   Last Admin: 06/15/19 08:39 Dose:  100 mg


Lactated Ringer's (Lactated Ringers 1000 Ml Bag*)  1,000 mls @ 100 mls/hr IV 

PER RATE [facility]


   Last Admin: 06/12/19 03:11 Dose:  100 mls/hr


Lactulose (Lactulose*)  30 ml PO Q6H PRN


   PRN Reason: constipation


   Last Admin: 06/13/19 12:59 Dose:  30 ml


Magnesium Hydroxide (Milk Of Magnesia Liq*)  30 ml PO BID [facility]


   Last Admin: 06/15/19 08:40 Dose:  Not Given


Magnesium Hydroxide (Milk Of Magnesia Liq*)  30 ml PO Q6H PRN


   PRN Reason: constipation


Morphine Sulfate (Morphine 4 Mg/Ml Vial (1 Ml))  2 mg IV Q2H PRN


   PRN Reason: PAIN


Multivitamins (Theragran Tab*)  1 tab PO DAILY [facility]


   Last Admin: 06/15/19 08:39 Dose:  1 tab


Multivitamins/Minerals (Preservision Areds 2)  1 cap PO BID [facility]


   Last Admin: 06/15/19 08:39 Dose:  1 cap


Ondansetron HCl (Zofran Inj*)  4 mg IV Q6H PRN


   PRN Reason: nausea


Oxycodone HCl (Roxycodone Tab*)  10 mg PO Q4H PRN


   PRN Reason: PAIN - SEVERE


   Last Admin: 06/14/19 06:38 Dose:  10 mg


Oxycodone/Acetaminophen (Percocet 5/325 Tab*)  1 tab PO Q4H PRN


   PRN Reason: PAIN


   Last Admin: 06/15/19 07:52 Dose:  1 tab


Oxycodone/Acetaminophen (Percocet 5/325 Tab*)  2 tab PO Q4H PRN


   PRN Reason: PAIN


   Last Admin: 06/14/19 18:33 Dose:  2 tab


Rosuvastatin Calcium (Crestor (Nf))  40 mg PO BEDTIME [facility]


   Last Admin: 06/14/19 21:42 Dose:  40 mg


Travoprost (Travatan Z 0.004% Opth (Nf))  1 drop BOTH EYES BEDTIME [facility]; Protocol


   Last Admin: 06/14/19 21:41 Dose:  1 drop








 Vital Signs - 8 hr











  06/15/19 06/15/19 06/15/19





  03:30 04:15 07:51


 


Temperature 98.4 F  98.7 F


 


Pulse Rate 96  93


 


Respiratory 16 18 16





Rate   


 


Blood Pressure 112/49  135/57





(mmHg)   


 


O2 Sat by Pulse 96  94





Oximetry   














  06/15/19 06/15/19





  07:52 08:00


 


Temperature  


 


Pulse Rate  


 


Respiratory 18 18





Rate  


 


Blood Pressure  





(mmHg)  


 


O2 Sat by Pulse  94





Oximetry  











Oxygen Devices in Use Now: None


Appearance: Patient is an 89yo female who appears stated age and is sitting in 

the bed in NAD.


Eyes: No Scleral Icterus, PERRLA


Ears/Nose/Mouth/Throat: NL Teeth, Lips, Gums, Clear Oropharnyx, Mucous 

Membranes Moist


Neck: NL Appearance and Movements; NL JVP, Trachea Midline


Respiratory: Symmetrical Chest Expansion and Respiratory Effort, Clear to 

Auscultation


Cardiovascular: NL Sounds; No Murmurs; No JVD, RRR, - - 1+ LLE edema. 


Abdominal: NL Sounds; No Tenderness; No Distention, No Hepatosplenomegaly


Lymphatic: No Cervical Adenopathy


Skin: No Nodules or Sclerosis, - - Left hip incision covered in bulky dressing. 


Neurological: Alert and Oriented x 3, NL Sensation, NL Muscle Strength and Tone

, - - CN II-XII intact. 


Result Diagrams: 


 06/15/19 05:19





 06/12/19 06:38





Assess/Plan/Problems-Billing


Assessment: 





88 yo white female with PMHx hypertension, CVA, macular degeneration, and 

glaucoma presents for elective left total hip arthroplasty with Dr. Collado. 

[facility] medicine was consulted for co-management of chronic medical conditions.





- Patient Problems


(1) S/P total hip arthroplasty


Current Visit: Yes   Status: Acute   Code(s): Z96.649 - PRESENCE OF UNSPECIFIED 

ARTIFICIAL HIP JOINT   SNOMED Code(s): 098291801161


   Comment: 


- Elective left total hip for osteoarthritis


- Management per orthopedic surgery team


- Continue pain control and bowel regimen per ortho surg


- PT/OT


   





(2) History of CVA (cerebrovascular accident)


Current Visit: Yes   Status: Acute   Code(s): Z86.73 - PRSNL HX OF TIA (TIA), 

AND CEREB INFRC W/O RESID DEFICITS   SNOMED Code(s): 363597637


   Comment: 


- Continue aspirin and statin


- No New deficits   





(3) Hypertension


Current Visit: Yes   Status: Acute   Code(s): I10 - ESSENTIAL (PRIMARY) 

HYPERTENSION   SNOMED Code(s): 97588881


   Comment: 


- Patient has been normotensive


- Continue home cardizem   





(4) Systolic murmur


Current Visit: Yes   Status: Acute   Code(s): R01.1 - CARDIAC MURMUR, 

UNSPECIFIED   SNOMED Code(s): 89462719


   Comment: 


- Patient has known moderate aortic stenosis, no signs of fluid overload.    





(5) DVT prophylaxis


Current Visit: Yes   Status: Acute   Code(s): Z29.9 - ENCOUNTER FOR 

PROPHYLACTIC MEASURES, UNSPECIFIED   SNOMED Code(s): 597411952


   Comment: 


-Eliquis per ortho surg   





(6) Full code status


Current Visit: Yes   Status: Acute   Code(s): Z78.9 - OTHER SPECIFIED HEALTH 

STATUS   SNOMED Code(s): 781816211


   


Status and Disposition: 





disposition per orthopedic surgery, hopeful D/C Monday to [facility].

## 2019-06-16 LAB
HCT VFR BLD AUTO: 27 % (ref 35–47)
HGB BLD-MCNC: 9.2 G/DL (ref 12–16)
PLATELET # BLD AUTO: 208 10^3/UL (ref 150–450)

## 2019-06-16 RX ADMIN — OXYCODONE HYDROCHLORIDE AND ACETAMINOPHEN PRN TAB: 5; 325 TABLET ORAL at 08:54

## 2019-06-16 RX ADMIN — ROSUVASTATIN CALCIUM SCH MG: 40 TABLET, FILM COATED ORAL at 21:22

## 2019-06-16 RX ADMIN — Medication SCH CAP: at 21:22

## 2019-06-16 RX ADMIN — ASPIRIN SCH MG: 81 TABLET, COATED ORAL at 08:55

## 2019-06-16 RX ADMIN — DILTIAZEM HYDROCHLORIDE SCH MG: 120 CAPSULE, COATED, EXTENDED RELEASE ORAL at 08:55

## 2019-06-16 RX ADMIN — MAGNESIUM HYDROXIDE SCH: 400 SUSPENSION ORAL at 08:55

## 2019-06-16 RX ADMIN — DOCUSATE SODIUM SCH: 100 CAPSULE, LIQUID FILLED ORAL at 08:55

## 2019-06-16 RX ADMIN — APIXABAN SCH MG: 2.5 TABLET, FILM COATED ORAL at 08:55

## 2019-06-16 RX ADMIN — TRAVOPROST SCH DROP: 0.04 SOLUTION/ DROPS OPHTHALMIC at 21:23

## 2019-06-16 RX ADMIN — ALUMINUM HYDROXIDE, MAGNESIUM HYDROXIDE, AND SIMETHICONE PRN ML: 200; 200; 20 SUSPENSION ORAL at 23:47

## 2019-06-16 RX ADMIN — Medication SCH CAP: at 08:55

## 2019-06-16 RX ADMIN — APIXABAN SCH MG: 2.5 TABLET, FILM COATED ORAL at 21:20

## 2019-06-16 RX ADMIN — OXYCODONE HYDROCHLORIDE AND ACETAMINOPHEN PRN TAB: 5; 325 TABLET ORAL at 08:59

## 2019-06-16 RX ADMIN — OXYCODONE HYDROCHLORIDE AND ACETAMINOPHEN PRN TAB: 5; 325 TABLET ORAL at 02:10

## 2019-06-16 RX ADMIN — MAGNESIUM HYDROXIDE SCH: 400 SUSPENSION ORAL at 20:54

## 2019-06-16 RX ADMIN — THERA TABS SCH TAB: TAB at 08:55

## 2019-06-16 RX ADMIN — DOCUSATE SODIUM SCH MG: 100 CAPSULE, LIQUID FILLED ORAL at 21:21

## 2019-06-16 RX ADMIN — OXYCODONE HYDROCHLORIDE AND ACETAMINOPHEN PRN TAB: 5; 325 TABLET ORAL at 23:38

## 2019-06-16 RX ADMIN — OXYCODONE HYDROCHLORIDE AND ACETAMINOPHEN PRN TAB: 5; 325 TABLET ORAL at 17:51

## 2019-06-16 RX ADMIN — OXYCODONE HYDROCHLORIDE AND ACETAMINOPHEN PRN TAB: 5; 325 TABLET ORAL at 13:16

## 2019-06-16 NOTE — PN
Progress Note





- Progress Note


Date of Service: 06/16/19


SOAP: 


Subjective:


Pt is doing well. Pain controlled. Denies F/C, CP/SOB or calf pain








Objective:


PE_ 89 y/o WDWN F NAD


LLE- dressing changed, inc c/d/i, calf soft NT, +DF/PF ankle, NVI





 Vital Signs











Temp Pulse Resp BP Pulse Ox


 


 98.8 F   83   18   146/55   99 


 


 06/16/19 07:46  06/16/19 07:46  06/16/19 08:59  06/16/19 07:46  06/16/19 07:57








 Laboratory Results - last 24 hr











  06/16/19





  05:26


 


Hgb  9.2 L


 


Hct  27 L


 


Plt Count  208


 


MPV  8.3




















Assessment:


[]s/p LTH POD #5








Plan:


[]Eliquis for DVT prophylaxis


  WBAT LLE


  Cont PT/OT


  Posterior hip precautions


  Awaiting SNF rehab placement likely Monday

## 2019-06-17 VITALS — DIASTOLIC BLOOD PRESSURE: 44 MMHG | SYSTOLIC BLOOD PRESSURE: 127 MMHG

## 2019-06-17 RX ADMIN — DILTIAZEM HYDROCHLORIDE SCH MG: 120 CAPSULE, COATED, EXTENDED RELEASE ORAL at 10:13

## 2019-06-17 RX ADMIN — OXYCODONE HYDROCHLORIDE AND ACETAMINOPHEN PRN TAB: 5; 325 TABLET ORAL at 06:01

## 2019-06-17 RX ADMIN — Medication SCH CAP: at 10:13

## 2019-06-17 RX ADMIN — OXYCODONE HYDROCHLORIDE AND ACETAMINOPHEN PRN TAB: 5; 325 TABLET ORAL at 10:12

## 2019-06-17 RX ADMIN — APIXABAN SCH MG: 2.5 TABLET, FILM COATED ORAL at 10:13

## 2019-06-17 RX ADMIN — DOCUSATE SODIUM SCH MG: 100 CAPSULE, LIQUID FILLED ORAL at 10:13

## 2019-06-17 RX ADMIN — THERA TABS SCH TAB: TAB at 10:13

## 2019-06-17 RX ADMIN — MAGNESIUM HYDROXIDE SCH: 400 SUSPENSION ORAL at 10:06

## 2019-06-17 RX ADMIN — ASPIRIN SCH MG: 81 TABLET, COATED ORAL at 10:13

## 2019-06-17 NOTE — DS
Orthopedic Discharge Summary





- Discharge Summary





Date of Admission:06/11/19


Date of Discharge: 6/17/19


Date of Surgery: 6/11/19


Attending Orthopedic Provider: Dr Collado


Pre-operative Diagnosis: Left hip osteoarthritis


Operative Procedure: left total hip replacement


Disposition of Patient: [facility]


Condition of Patient: Stable


History: MILLY SHEETS is a 89 year old F with years of increasingly severe 

left hip pain. Patient has failed conservative management and has elected to 

undergo a left total hip replacement


Hospital Course: MILLY was admitted to [facility] on 06/11/19. 

Patient underwent a left total hip replacement without complication followed by 

a brief recovery in PACU and transfer to the Short Stay Surgical Unit in stable 

condition. Our hospitalist service, physical therapy and occupational therapy 

also participated in this patients care. Post-op day 1: patient was alert and 

in no acute distress. Dressing was clean, dry and intact. Operative extremity 

dorsiflexion and plantarflexion intact, sensation intact to light touch distally

, DP2+. Post-op day two: dressing was changed, incision was clean, dry and 

intact, exam unchanged for the remainder of hospital course. On 6/17/19 Patient 

was deemed to be medically and orthopedically stable for discharge and SNF 

placement was approved. Physical therapy goals were met.





 Home Medications











 Medication  Instructions  Recorded  Confirmed  Type


 


Aspirin [Aspirin EC] 81 mg PO QAM 04/14/14 06/11/19 History


 


Cyanocobalamin TAB* [Vitamin B12 1 tab PO QAM 04/14/14 06/11/19 History





TAB*]    


 


Travoprost Z 0.004% OPHTH (NF) 1 drop BOTH EYES BEDTIME 04/14/14 06/11/19 

History





[Travatan Z 0.004% OPTH (NF)]    


 


Yonis/D3/Mag11/Zinc//Cody/Bor 1 tab PO QAM 05/29/19 06/11/19 History





[Caltrate 600+D Plus Tablet]    


 


Eyelea 1 dose INJ .6WEEK 05/29/19 06/11/19 History


 


Methylcellulose [Fiber Therapy] 500 mg PO QAM 05/29/19 06/11/19 History


 


Mv-Mn/Folic Acid/Calcium/Vit K 1 each PO QAM 05/29/19 06/11/19 History





[Women's 50 Plus Multivit Tab]    


 


Rosuvastatin Calcium [Crestor] 40 mg PO QPM 05/29/19 06/11/19 History


 


Systane 0.3-0.4% Eye Drop 1 - 2 drop BOTH EYES ONCE PRN 05/29/19 06/11/19 

History


 


Vit C/E/Zn/Coppr/Lutein/Zeaxan 2 each PO QAM 05/29/19 06/11/19 History





[Preservision Areds 2 Softgel]    


 


dilTIAZem HCl [Diltiazem 24Hr ER] 120 mg PO QAM 05/29/19 06/11/19 History


 


Apixaban* [Eliquis*] 2.5 mg PO BID  tab 06/16/19  Rx


 


    





    


 


Docusate CAP* [Colace Cap*] 100 mg PO BID  cap 06/16/19  Rx


 


oxyCODONE/Acetamin 5/325 MG* 1 tab PO Q4H PRN  tab MDD 8 06/16/19  Rx





[Percocet 5/325 TAB*]    


 


oxyCODONE/Acetamin 5/325 MG* 2 tab PO Q4H PRN  tab MDD 8 06/16/19  Rx





[Percocet 5/325 TAB*]    











DISCHARGE INSTRUCTIONS:





Weight Bearing as tolerated. Posterior hip precautions





Wound Care:   OK to shower on post-op day 3, no bathing/ swimming/ submerging 

wound.   Use gentle soap, pat dry.   Cover with gauze, ACE wrap or tape.   


Call Orthopedic office for increased drainage, redness, increased pain, or 

fever.   Go to ER with shortness of breath or chest pain.  








Diet:  Regular diet, increase fluids and fiber to prevent constipation.  

Continue to use stool softeners, call office if no bowel motion within 48 

hours. 





Continue colace as needed for constipation 








**Hip replacements: Continue Hip Precautions- do not cross legs or bend greater 

than 90 degrees/ squat**





- Continue physical therapy and occupational therapy exercises as shown.  





- Facility nurses to do wound checks. 





- Eliquis- 2.5mg twice daily x 1 month post op. this medication increases 

bleeding tendency








- Pain control with: Percocet 5/325 mg 1-2 tabs by mouth every 4-6 hours as 

needed for pain. Maximum of 8 tabs per day . hold for sedation


Please note that percocet contains tylenol (acetaminophen). Maximum daily dose 

of tylenol is 4000 mg from all sources.





- Antibiotics required prior to any dental work. 





FOLLOW UP: Follow up with Dr. Collado Within  10-14 days, call for appointment 





Please call our office with any questions or concerns ([phone])

## 2019-06-17 NOTE — PN
Progress Note





- Progress Note


Date of Service: 06/17/19


SOAP: 


Subjective:


[]Patient seen and examined OOB in chair today. She feels well, hip pain is 

well controlled. Denies CP, SOB, dizziness, nausea.





Objective:


[]Gen: Appears well , NAD 


LLE: left hip dressing changed, incision CDI no erythema or discharge. Thigh is 

soft, DF/PF intact, DP2+, sensation intact to light touch distally


Calves are supple and nontender without erythema, edema or palpable cords 








Assessment:


[]s/p LTH POD #6








Plan:


[]Eliquis for DVT prophylaxis


  WBAT LLE


  Cont PT/OT


  Posterior hip precautions


  DC Jber today








 Vital Signs











Temp  98.6 F   06/17/19 07:40


 


Pulse  81   06/17/19 07:40


 


Resp  16   06/17/19 09:14


 


BP  127/44   06/17/19 07:40


 


Pulse Ox  93   06/17/19 07:40








 Intake & Output











 06/16/19 06/17/19 06/17/19





 18:59 06:59 18:59


 


Intake Total 970 400 


 


Output Total 850 400 


 


Balance 120 0 


 


Intake:   


 


  Oral 970 400 


 


Output:   


 


  Urine 850 400 


 


Other:   


 


  Date of Last Bowel   06/17/19





  Movement   


 


  # Bowel Movements  0 1


 


  Estimated Stool Amount   Small








 Laboratory Last Values











Hgb  9.2 g/dL (12.0-16.0)  L  06/16/19  05:26    


 


Hct  27 % (35-47)  L  06/16/19  05:26    


 


Plt Count  208 10^3/uL (150-450)   06/16/19  05:26    


 


MPV  8.3 fL (7.4-10.4)   06/16/19  05:26    


 


Sodium  137 mmol/L (135-145)   06/12/19  06:38    


 


Potassium  4.3 mmol/L (3.5-5.0)   06/12/19  06:38    


 


Chloride  107 mmol/L (101-111)   06/12/19  06:38    


 


Carbon Dioxide  24 mmol/L (22-32)   06/12/19  06:38    


 


Anion Gap  6 mmol/L (2-11)   06/12/19  06:38    


 


BUN  15 mg/dL (6-24)   06/12/19  06:38    


 


Creatinine  0.61 mg/dL (0.51-0.95)   06/12/19  06:38    


 


Est GFR ( Amer)  111.7  (>60)   06/12/19  06:38    


 


Est GFR (Non-Af Amer)  92.4  (>60)   06/12/19  06:38    


 


BUN/Creatinine Ratio  24.6  (8-20)  H  06/12/19  06:38    


 


Glucose  148 mg/dL ()  H  06/12/19  06:38    


 


Calcium  8.6 mg/dL (8.6-10.3)   06/12/19  06:38    


 


Troponin I  0.01 ng/mL (<0.04)   06/13/19  01:18

## 2019-06-23 ENCOUNTER — HOSPITAL ENCOUNTER (EMERGENCY)
Dept: HOSPITAL 25 - ED | Age: 84
Discharge: HOME | End: 2019-06-23
Payer: MEDICARE

## 2019-06-23 VITALS — DIASTOLIC BLOOD PRESSURE: 71 MMHG | SYSTOLIC BLOOD PRESSURE: 146 MMHG

## 2019-06-23 DIAGNOSIS — Z88.8: ICD-10-CM

## 2019-06-23 DIAGNOSIS — Z91.040: ICD-10-CM

## 2019-06-23 DIAGNOSIS — Z88.2: ICD-10-CM

## 2019-06-23 DIAGNOSIS — L53.9: ICD-10-CM

## 2019-06-23 DIAGNOSIS — Z96.642: ICD-10-CM

## 2019-06-23 DIAGNOSIS — Z88.0: ICD-10-CM

## 2019-06-23 DIAGNOSIS — M25.552: Primary | ICD-10-CM

## 2019-06-23 DIAGNOSIS — Z88.1: ICD-10-CM

## 2019-06-23 DIAGNOSIS — M96.840: ICD-10-CM

## 2019-06-23 DIAGNOSIS — I10: ICD-10-CM

## 2019-06-23 DIAGNOSIS — Z91.048: ICD-10-CM

## 2019-06-23 LAB
ANION GAP SERPL CALC-SCNC: 5 MMOL/L (ref 2–11)
BASOPHILS # BLD AUTO: 0.1 10^3/UL (ref 0–0.2)
BUN SERPL-MCNC: 9 MG/DL (ref 6–24)
BUN/CREAT SERPL: 15 (ref 8–20)
CALCIUM SERPL-MCNC: 9 MG/DL (ref 8.6–10.3)
CHLORIDE SERPL-SCNC: 106 MMOL/L (ref 101–111)
EOSINOPHIL # BLD AUTO: 0.1 10^3/UL (ref 0–0.6)
GLUCOSE SERPL-MCNC: 112 MG/DL (ref 70–100)
HCO3 SERPL-SCNC: 28 MMOL/L (ref 22–32)
HCT VFR BLD AUTO: 29 % (ref 35–47)
HGB BLD-MCNC: 9.6 G/DL (ref 12–16)
LYMPHOCYTES # BLD AUTO: 1.4 10^3/UL (ref 1–4.8)
MCH RBC QN AUTO: 31 PG (ref 27–31)
MCHC RBC AUTO-ENTMCNC: 33 G/DL (ref 31–36)
MCV RBC AUTO: 93 FL (ref 80–97)
MONOCYTES # BLD AUTO: 0.7 10^3/UL (ref 0–0.8)
NEUTROPHILS # BLD AUTO: 9.3 10^3/UL (ref 1.5–7.7)
NRBC # BLD AUTO: 0 10^3/UL
NRBC BLD QL AUTO: 0
PLATELET # BLD AUTO: 430 10^3/UL (ref 150–450)
POTASSIUM SERPL-SCNC: 3.9 MMOL/L (ref 3.5–5)
RBC # BLD AUTO: 3.13 10^6 /UL (ref 3.7–4.87)
SODIUM SERPL-SCNC: 139 MMOL/L (ref 135–145)
WBC # BLD AUTO: 11.7 10^3/UL (ref 3.5–10.8)

## 2019-06-23 PROCEDURE — 80048 BASIC METABOLIC PNL TOTAL CA: CPT

## 2019-06-23 PROCEDURE — 86140 C-REACTIVE PROTEIN: CPT

## 2019-06-23 PROCEDURE — 85025 COMPLETE CBC W/AUTO DIFF WBC: CPT

## 2019-06-23 PROCEDURE — 99283 EMERGENCY DEPT VISIT LOW MDM: CPT

## 2019-06-23 PROCEDURE — 36415 COLL VENOUS BLD VENIPUNCTURE: CPT

## 2019-06-23 PROCEDURE — 83605 ASSAY OF LACTIC ACID: CPT

## 2019-06-23 NOTE — XMS REPORT
Continuity of Care Document (CCD)

 Created on:2019



Patient:Shiela Sheets

Sex:Female

:1930

External Reference #:[id number]





Demographics







 Address  [address] [place] [address] [address]

 

 Home Phone  [phone]

 

 Email Address  [email or web address]

 

 Preferred Language  en

 

 Marital Status  Not  or 

 

 Yarsani Affiliation  Unknown

 

 Race  White

 

 Ethnic Group  Not  or 









Author







 Name  Sofia Keith









Support







 Name  Relationship  Address  Phone

 

 Frank Sheets  Unavailable  Unavailable  [phone]

 

 Love Sheets  Unavailable  Unavailable  [phone]









Care Team Providers







 Name  Role  Phone

 

 Tracy Carlin MD  Primary Care Physician  Unavailable









Payers







 Date  Identification Numbers  Payment Provider  Subscriber

 

 Effective: 2013  Policy Number: [id number]  Medicare Blue Ppo  
Shiela Sheets









 Group Number: [id number]  [address] [address]

 

 PayID:   [place] [id number]









 Expires: 2012  Policy Number: [id number]  Medicare Blue Ppo  Shiela Sheets









 Group Number: MEDICARE BLUE PPO  [address] [address]

 

 Group Name: [employer]  [place] [id number]









 PayID: 







Problems







 Active Problems  Provider  Date

 

 Benign essential hypertension  Tracy Carlin M.D.  Onset: 2010

 

 Cerebral artery occlusion  Tracy Carlin M.D.  Onset: 2010

 

 Localized, primary osteoarthritis  Radha Collado M.D.  Onset: 2016

 

 Localized, primary osteoarthritis of the  Radha Collado M.D.  Onset: 2016



 pelvic region and thigh    







Family History







 Date  Family Member(s)  Observation  Comments

 

   General  Heart Disease  

 

   General  Diabetes  

 

   General  Cancer  

 

   General  Alzheimer's Disease  

 

 :  (age 85  Father   due to Cancer,  



 Years)    Lung  

 

 :  (age 93  Mother   due to  



 Years)    Alzheimer's Disease  

 

   First Brother   due to Heart  ()



     Disease  

 

   Second Brother   due to Melanoma  () - CAD

 

   Third Brother   due to  () -  of



     Alzheimer's Disease  pneumonia

 

   First Sister  Breast Cancer  

 

 :  (age 85  Second Sister   due to COPD  



 Years)      

 

   Third Sister  Alive And Well  







Social History







 Type  Date  Description  Comments

 

 Birth Sex    Unknown  

 

 Marital Status    Single  First  killed



       in , remarried



       but  in 

 

 Lives With    Alone  Drives without



       problems. Worked at



       [employer] quit in



       . Volunteers at



       the [employer]

 

 Occupation    Book keeper  

 

 Advance Directive    Health Care Proxy  son: Frank Sheets

 

 Tobacco Use  Start: Unknown  Never Smoked Cigarettes  

 

 ETOH Use    Denies alcohol use  

 

 Tobacco Use  Start: Unknown  Patient has never  



     smoked  

 

 Recreational Drug Use    Denies Drug Use  

 

 Smoking Status  Reviewed: 19  Patient has never  



     smoked  

 

 Exercise Type/Frequency    Exercises sporadically  







Allergies, Adverse Reactions, Alerts







 Active Allergies  Reaction  Severity  Comments  Date

 

 Penicillin  HIVES  Severe    2010

 

 Sulfa  CHEST PAIN  Severe    2010

 

 Keflex  RASH  Severe    2010

 

 Lisinopril  cough      10/16/2012

 

 Metaxalone  dizzy      2014

 

 Latex/Tape        2019







Medications







 Active Medications  SIG  Qnty  Indications  Ordering Provider  Date

 

 Vit B12  3 x wk      Tracy Carlin,  2013



         M.DNavya  

 

 Diltiazem HCL ER  take one capsule  90caps  R06.02  Tracy Carlin,  10/16/
2012



 Coated Beads  by mouth once      M.D.  



            120mg Caps  daily        



 ER 24HR          



           

 

 Crestor  Take One Tablet  90tabs    Tracy Carlin,  2012



       40mg Tablets  By Mouth Once      M.D.  



   Daily        

 

 Aspirin  1 by mouth once      Tracy Carlin,  2010



       81mg Tablets DR  daily      M.DNavya  



           

 

 Tubersol        Unknown  

 

 Rosuvastatin Calcium        Unknown  

 

 Preservision Areds        Unknown  

 

 Percocet        Unknown  

 

 Milk Of Magnesia        Unknown  

 

 Metamucil        Unknown  

 

 Glycerin (Adult)        Unknown  

 

 One Daily For Women  1 po qd      Unknown  



           



 Tablets          



           

 

 Travatan  each eye nightly      Unknown  



        0.004%          



 Solution          



           









 History Medications









 Tramadol HCL  1-2 tablets  42tabs  M54.9  Tracy  2018 -



             50mg  every 6 hours as      LAURA Carlin  2019



 Tablets  needed        



           

 

 Doxycycline Hyclate  one tablet twice  20caps  J01.00  Donavon Araiza NP  10/31/
2016 -



   daily for 10        2016



 100mg Capsules  days.        



           

 

 Fluticasone  2 sprays each  16units  J01.00  Donavon Araiza NP  10/31/2016 -



 Propionate  nostril daily as        2019



           50mcg/Act  needed-not using        



 Suspension          



           

 

 Benzonatate  take one or two  30caps  J01.00  Donavon Araiza NP  10/31/2016 -



            100mg  capsules every 8        2016



 Capsules  hours as needed        



   for cough.        

 

 Levaquin  1 by mouth daily  10tabs  J01.00  Lisseth Hardy,  2016 -



         500mg  for 10 days      N.P.  2016



 Tablets          



           

 

 Azithromycin  two tabs day  6tabs  J01.00  Lisseth Hardy,  2016 -



             250mg  one, one daily      N.P.  2016



 Tablets  till gone        



           

 

 Fluticasone  2 sprays each  16units  J01.00  Lisseth Hardy,  2016 -



 Propionate  nostril daily as      N.P.  2016



           50mcg/Act  needed        



 Suspension          



           

 

 Meloxicam  1 by mouth every  60tabs  M16.11  Radha Collado,  2016 -



          15mg  day      M.D.  2017



 Tablets          



           

 

 Lorazepam  1-2 tabs by  5tabs    Tracy  2016 -



          0.5mg  mouth prior to      LAURA Carlin  04/10/2016



 Tablets  procedure        



           

 

 Omeprazole  1 by mouth as  30caps  R14.2  Donavon Araiza NP  2015 -



           20mg  needed        2016



 Capsules DR Andrew  1 by mouth every  30tabs    Lisseth Hardy,  2015 -



         10mg Tablets  day      N.P.  2015



           

 

 Ditropan XL  1 by mouth every  30tabs  788.43  Lisseth Hardy,  2015 -



            10mg  day      N.P.  2015



 Tablets ER 24HR          



           

 

 Metaxalone  take 1 tablet 3  30tabs  724.5  Lisseth Hardy,  2014 -



           800mg  times a day as      N.P.  2014



 Tablets  needed        



           

 

 Tizanidine HCL  One po q 4 hours  30caps    Lisseth Hardy,  2014 -



               4mg  prn      N.P.  2014



 Capsules          



           

 

 Zolpidem Tartrate  1/2-1 po tablet  30tabs  780.52  Tracy  10/17/2013 -



                  5mg  at bedtime as      LAURA Carlin  2014



 Tablets  needed        



           

 

 Zostavax  1 dose s/c  1units    Tracy  2012 -



          Solution        LAURA Carlin  10/15/2012



 Rec          

 

 Diltiazem HCL ER  Take One Capsule  30caps  786.05  [facility]  2012 -



   By Mouth Once      LAURA Carlin  10/16/2012



 180mg Caps ER 24HR  Daily        



           

 

 Atorvastatin Calcium  1 po qd  90tabs    [facility]  2012 -



         LAURA Carlin  2012



 40mg Tablets          



           

 

 Diltiazem CD  1 PO qd  30caps  786.05  [facility]  2011 -



             180mg        LARUA Carlin  2012



 Caps ER 24HR          



           

 

 Caltrate 600+D  1 by mouth once      Tracy  2010 -



   daily      LAURA Carlin  2019



 600-400mg-Unit          



 Tablets          



           

 

 Proctosol HC  Use as directed  28.350gm    Tracy  2010 -



             2.5%  prn      LAURA Carlin  2019



 Cream          



           

 

 Lisinopril  1 tablet once  30tabs  786.05  Tracy   -



           10mg  daily      LAURA Carlin  2011



 Tablets          



           

 

 Cipro  1 tablet twice a  20tabs    Unknown   -



      500mg Tablets  day        2011



           

 

 Crestor  Take One Tablet  30tabs    Tracy   -



        40mg Tablets  By Mouth AT      LAURA Carlin  2012



   Bedtime        

 

 Aleve  1 po prn  60caps    Unknown   -



      220mg Capsules          10/16/2012



           

 

 Eye Vitamins  daily      Unknown   -



           2019



 Capsules          



           

 

 Flonase Allergy  2 sprays each      Unknown   -



 Relief  nostril once        2015



       50mcg/Act  daily        



 Suspension          



           

 

 Eylea  prn      Unknown   -



      2mg/0.05ML          2019



 Solution          



           

 

 Tylenol 8 Hour  1 tab every 6      Unknown   -



               650mg  hours as needed        2019



 Tablets ER  for pain        



           

 

 Eq Fiber Therapy  2 in in the      Unknown   -



   morning        2019



 500mg Tablets          



           







Medications Administered in Office







 Medication  SIG  Qnty  Indications  Ordering Provider  Date

 

 Depomedrol 40MG        Radha Collado M.D.  2019



         Injection          



           

 

 Inj, Regadenoson, 0.1 MG        Amador Diaz, DO [facility]  2016



                  Injection          



           

 

 Inj, Regadenoson, 0.1 MG        GLORIA Peralta  2016



                  Injection          



           

 

 Technetium TC 99M        Amador Diaz, DO [facility]  2016



 Tetrofosmin, Per Unit Dose          



 Up To 40 Millicuries          



              Injection          



           

 

 Technetium TC 99M        GLORIA Peralta  2016



 Tetrofosmin, Per Unit Dose          



 Up To 40 Millicuries          



              Injection          



           







Immunizations







 CPT Code  Status  Date  Vaccine  Lot #

 

 [id number]  Given  10/02/2018  Influenza Virus Vaccine, Quadrivalent, Split,  



       Preservative Free  

 

 29606  Given  2017  Influenza Virus Vaccine, Quadrivalent, Split,  [id number]



       Preservative Free  

 

 55218  Given  10/14/2016  Influ Virus Vaccine, Quadrivalent, Split Virus,  
[id number]



       Im Fluzone not PF  

 

 [id number]  Given  10/10/2015  Influenza Virus Vaccine, Quadrivalent, Split,  [id number]



       Preservative Free  

 

 16568  Given  2015  Pneumococcal Conjugate Vaccine 13 Valent For  [id number]



       Intramuscular Use  

 

 98034  Given  10/14/2014  Influenza Virus Vaccine, Quadrivalent, Split,  [id number]



       Preservative Free  

 

 [id number]  Given  10/17/2013  Flu Vaccine Split Virus Preservative Free For  
[id number]



       Indiv 3Yr Older  

 

   Given  2012  Fluzone Vaccine  

 

 91885  Given  2012  Zoster (Zostavax)  

 

   Given  2011  Afluria Vaccine  [id number]

 

 71846  Given  2010  Influenza Virus 3Yrs & Over  [id number]

 

 22442  Given  2009  Influenza Virus 3Yrs & Over  

 

 28048  Given  10/22/2008  Influenza Virus 3Yrs & Over  

 

 59607  Given  10/22/2008  Influenza Virus 3Yrs & Over  

 

 65434  Given  10/25/2007  Influenza Virus 3Yrs & Over  







Vital Signs







 Date  Vital  Result  Comment

 

 2019  8:05am  Height  63 inches  5'3"









 Weight  144.00 lb  

 

 BP Systolic  120 mmHg  

 

 BP Diastolic  64 mmHg  

 

 Body Temperature  98.2 F  

 

 BMI (Body Mass Index)  25.5 kg/m2  









 2019  8:40am  Height  63 inches  5'3"









 Weight  144.00 lb  

 

 Heart Rate  73 /min  

 

 BP Systolic Sitting  133 mmHg  

 

 BP Diastolic Sitting  66 mmHg  

 

 O2 % BldC Oximetry  97 %  

 

 BMI (Body Mass Index)  25.5 kg/m2  









 2019  9:00am  Height  63 inches  5'3"









 Weight  144.00 lb  

 

 Heart Rate  73 /min  

 

 BP Systolic  116 mmHg  

 

 BP Diastolic  57 mmHg  

 

 Body Temperature  97.7 F  

 

 BMI (Body Mass Index)  25.5 kg/m2  









 2019  2:55pm  Height  63 inches  5'3"









 Weight  144.00 lb  with shoes

 

 Heart Rate  70 /min  

 

 BP Systolic  140 mmHg  Rue reg cuff

 

 BP Diastolic  60 mmHg  Rue reg cuff

 

 BP Systolic Sitting  134 mmHg  Lue reg cuff

 

 BP Diastolic Sitting  62 mmHg  Lue reg cuff

 

 BP Systolic Standing  132 mmHg  Lue reg cuff

 

 BP Diastolic Standing  60 mmHg  Lue reg cuff

 

 Respiratory Rate  15 /min  

 

 BMI (Body Mass Index)  25.5 kg/m2  

 

 Ejection Fraction  65-70%  date 19 ECHO









 2019 11:14am  Height  63 inches  5'3"









 Heart Rate  66 /min  

 

 BP Systolic Sitting  150 mmHg  

 

 BP Diastolic Sitting  66 mmHg  

 

 Pain Level  5  

 

 O2 % BldC Oximetry  95 %  









 2019  2:47pm  Height  63 inches  5'3"









 Weight  145.00 lb  

 

 BP Systolic  132 mmHg  

 

 BP Diastolic  60 mmHg  

 

 Body Temperature  98.2 F  

 

 Pain Level  6  

 

 BMI (Body Mass Index)  25.7 kg/m2  









 2019  2:38pm  Height  62.5 inches  5'2.50"









 Weight  142.38 lb  

 

 Heart Rate  76 /min  

 

 BP Systolic  132 mmHg  

 

 BP Diastolic  65 mmHg  

 

 Body Temperature  97.9 F  

 

 O2 % BldC Oximetry  97 %  

 

 BMI (Body Mass Index)  25.6 kg/m2  









 2019  1:40pm  Height  62.5 inches  5'2.50"









 Weight  145.00 lb  

 

 Heart Rate  76 /min  

 

 BP Systolic  130 mmHg  

 

 BP Diastolic  76 mmHg  

 

 BMI (Body Mass Index)  26.1 kg/m2  









 2018  8:52am  Height  63 inches  5'3"









 Weight  143.00 lb  

 

 Heart Rate  80 /min  

 

 BP Systolic  124 mmHg  

 

 BP Diastolic  70 mmHg  

 

 BP Systolic Sitting  147 mmHg  

 

 BP Diastolic Sitting  70 mmHg  

 

 O2 % BldC Oximetry  98 %  

 

 BMI (Body Mass Index)  25.3 kg/m2  









 2018  3:36pm  Height  63 inches  5'3"









 Weight  143.00 lb  

 

 Heart Rate  67 /min  

 

 BP Systolic Sitting  135 mmHg  

 

 BP Diastolic Sitting  67 mmHg  

 

 O2 % BldC Oximetry  96 %  

 

 BMI (Body Mass Index)  25.3 kg/m2  









 2018  9:42am  Height  63 inches  5'3"









 Weight  145.00 lb  

 

 Heart Rate  73 /min  

 

 BP Systolic Sitting  128 mmHg  

 

 BP Diastolic Sitting  65 mmHg  

 

 Body Temperature  98.1 F  

 

 O2 % BldC Oximetry  97 %  

 

 BMI (Body Mass Index)  25.7 kg/m2  









 2018 10:33am  Weight  146.25 lb  









 Heart Rate  81 /min  

 

 BP Systolic Sitting  110 mmHg  

 

 BP Diastolic Sitting  68 mmHg  

 

 Body Temperature  99.7 F  

 

 O2 % BldC Oximetry  95 %  









 2017 10:49am  Height  63 inches  5'3"









 Weight  146.50 lb  

 

 Heart Rate  68 /min  

 

 BP Systolic  140 mmHg  

 

 BP Diastolic  80 mmHg  

 

 Body Temperature  98.1 F  

 

 O2 % BldC Oximetry  97 %  

 

 BMI (Body Mass Index)  25.9 kg/m2  









 2017  9:44am  Weight  145.00 lb  









 Heart Rate  80 /min  

 

 BP Systolic Sitting  140 mmHg  

 

 BP Diastolic Sitting  70 mmHg  

 

 Respiratory Rate  16 /min  

 

 O2 % BldC Oximetry  98 %  









 2016  9:55am  Weight  146.00 lb  









 Heart Rate  70 /min  

 

 BP Systolic  122 mmHg  

 

 BP Diastolic  74 mmHg  

 

 Respiratory Rate  15 /min  

 

 Body Temperature  98.0 F  

 

 O2 % BldC Oximetry  98 %  









 10/31/2016 10:28am  Heart Rate  84 /min  









 BP Systolic Sitting  138 mmHg  

 

 BP Diastolic Sitting  80 mmHg  

 

 Respiratory Rate  14 /min  

 

 Body Temperature  98.1 F  

 

 O2 % BldC Oximetry  98 %  









 2016  4:23pm  Weight  143.00 lb  with shoes









 Heart Rate  68 /min  

 

 BP Systolic Sitting  140 mmHg  

 

 BP Diastolic Sitting  70 mmHg  

 

 Body Temperature  98.2 F  

 

 O2 % BldC Oximetry  98 %  









 2016  2:43pm  Height  62 inches  5'2"









 Weight  146.00 lb  

 

 Heart Rate  76 /min  

 

 BP Systolic Sitting  120 mmHg  

 

 BP Diastolic Sitting  72 mmHg  

 

 Respiratory Rate  15 /min  

 

 Body Temperature  97.8 F  

 

 O2 % BldC Oximetry  97 %  

 

 BMI (Body Mass Index)  26.7 kg/m2  









 2016  4:21pm  Heart Rate  77 /min  









 BP Systolic Sitting  140 mmHg  

 

 BP Diastolic Sitting  70 mmHg  

 

 Body Temperature  98.8 F  

 

 O2 % BldC Oximetry  97 %  









 2016  2:01pm  Height  63 inches  5'3"









 Weight  145.00 lb  

 

 Heart Rate  72 /min  

 

 BP Systolic  137 mmHg  

 

 BP Diastolic  69 mmHg  

 

 BMI (Body Mass Index)  25.7 kg/m2  









 2016  2:25pm  Heart Rate  70 /min  









 BP Systolic Sitting  133 mmHg  

 

 BP Diastolic Sitting  73 mmHg  

 

 Body Temperature  98.4 F  

 

 Pain Level  4  

 

 O2 % BldC Oximetry  97 %  









 2015  9:31am  Height  63 inches  5'3"









 Weight  148.00 lb  

 

 Heart Rate  70 /min  

 

 BP Systolic Sitting  128 mmHg  

 

 BP Diastolic Sitting  82 mmHg  

 

 Respiratory Rate  15 /min  

 

 Body Temperature  98.5 F  

 

 O2 % BldC Oximetry  98 %  

 

 BMI (Body Mass Index)  26.2 kg/m2  









 2015 10:39am  Weight  148.00 lb  









 Heart Rate  66 /min  

 

 BP Systolic Sitting  122 mmHg  

 

 BP Diastolic Sitting  68 mmHg  

 

 Respiratory Rate  14 /min  

 

 Body Temperature  98.8 F  

 

 O2 % BldC Oximetry  98 %  









 10/10/2015 10:03am  Body Temperature  97.1 F  

 

 2015  3:47pm  Weight  145.75 lb  









 Heart Rate  72 /min  

 

 BP Systolic Sitting  154 mmHg  

 

 BP Diastolic Sitting  79 mmHg  









 2015 10:00am  Weight  148.00 lb  









 Heart Rate  71 /min  

 

 BP Systolic Sitting  125 mmHg  

 

 BP Diastolic Sitting  70 mmHg  

 

 Body Temperature  97.7 F  









 2015  8:44am  Weight  145.75 lb  









 Heart Rate  77 /min  

 

 BP Systolic Sitting  134 mmHg  

 

 BP Diastolic Sitting  68 mmHg  

 

 Body Temperature  97.7 F  

 

 O2 % BldC Oximetry  98 %  









 2015 12:56pm  Height  63 inches  5'3"









 Weight  150.00 lb  

 

 Heart Rate  74 /min  

 

 BP Systolic  139 mmHg  

 

 BP Diastolic  64 mmHg  

 

 Body Temperature  98.3 F  

 

 BMI (Body Mass Index)  26.6 kg/m2  









 2014 10:00am  Height  63 inches  5'3"









 Weight  148.50 lb  

 

 Heart Rate  83 /min  

 

 BP Systolic Sitting  120 mmHg  

 

 BP Diastolic Sitting  60 mmHg  

 

 Body Temperature  97.3 F  

 

 O2 % BldC Oximetry  96 %  

 

 BMI (Body Mass Index)  26.3 kg/m2  









 2014 12:56pm  Height  63 inches  5'3"









 Weight  148.00 lb  

 

 Heart Rate  76 /min  

 

 BP Systolic Sitting  118 mmHg  

 

 BP Diastolic Sitting  62 mmHg  

 

 Body Temperature  98.3 F  

 

 BMI (Body Mass Index)  26.2 kg/m2  









 2014 11:40am  Weight  147.50 lb  









 Heart Rate  76 /min  

 

 BP Systolic  144 mmHg  

 

 BP Diastolic  76 mmHg  

 

 Respiratory Rate  16 /min  

 

 Body Temperature  98.3 F  









 10/17/2013 12:59pm  Weight  147.00 lb  









 Heart Rate  72 /min  

 

 BP Systolic  122 mmHg  

 

 BP Diastolic  62 mmHg  









 2013  4:16pm  Height  62 inches  5'2"









 Weight  144.50 lb  

 

 Heart Rate  84 /min  

 

 BP Systolic Sitting  110 mmHg  

 

 BP Diastolic Sitting  60 mmHg  

 

 Body Temperature  98.1 F  

 

 BMI (Body Mass Index)  26.4 kg/m2  









 2013  2:28pm  Height  62.5 inches  5'2.50"









 Weight  146.00 lb  

 

 Heart Rate  72 /min  

 

 BP Systolic Sitting  130 mmHg  

 

 BP Diastolic Sitting  60 mmHg  

 

 BMI (Body Mass Index)  26.3 kg/m2  









 2012  9:37am  Height  63.5 inches  5'3.50"









 Weight  146.00 lb  

 

 Heart Rate  78 /min  

 

 BP Systolic Sitting  126 mmHg  

 

 BP Diastolic Sitting  68 mmHg  

 

 BMI (Body Mass Index)  25.5 kg/m2  









 10/16/2012  9:54am  Height  63.5 inches  5'3.50"









 Weight  146.00 lb  

 

 Heart Rate  72 /min  

 

 BP Systolic Sitting  120 mmHg  

 

 BP Diastolic Sitting  62 mmHg  

 

 BMI (Body Mass Index)  25.5 kg/m2  









 2012 10:27am  Height  63.5 inches  5'3.50"









 Weight  144.50 lb  

 

 Heart Rate  60 /min  

 

 BP Systolic Sitting  130 mmHg  

 

 BP Diastolic Sitting  82 mmHg  

 

 BMI (Body Mass Index)  25.2 kg/m2  









 2012 11:27am  Height  63.5 inches  5'3.50"









 Weight  142.00 lb  

 

 Heart Rate  76 /min  

 

 BP Systolic Sitting  126 mmHg  

 

 BP Diastolic Sitting  62 mmHg  

 

 BMI (Body Mass Index)  24.8 kg/m2  









 2011  1:24pm  Height  63.5 inches  5'3.50"









 Weight  140.00 lb  

 

 Heart Rate  68 /min  

 

 BP Systolic Sitting  134 mmHg  

 

 BP Diastolic Sitting  52 mmHg  

 

 O2 % BldC Oximetry  97 %  

 

 BMI (Body Mass Index)  24.4 kg/m2  









 2011 10:03am  Height  63.5 inches  5'3.50"









 Weight  140.00 lb  

 

 Heart Rate  64 /min  

 

 BP Systolic Sitting  110 mmHg  

 

 BP Diastolic Sitting  76 mmHg  

 

 BMI (Body Mass Index)  24.4 kg/m2  









 2011 11:06am  Height  63.5 inches  5'3.50"









 Weight  139.00 lb  

 

 Heart Rate  68 /min  

 

 BP Systolic Sitting  104 mmHg  

 

 BP Diastolic Sitting  78 mmHg  

 

 BMI (Body Mass Index)  24.2 kg/m2  









 2011 10:33am  Weight  140.00 lb  









 Heart Rate  70 /min  

 

 BP Systolic  130 mmHg  

 

 BP Diastolic  60 mmHg  









 2011 11:35am  Weight  139.00 lb  









 Heart Rate  60 /min  

 

 BP Systolic  126 mmHg  

 

 BP Diastolic  60 mmHg  

 

 Body Temperature  98.7 F  

 

 O2 % BldC Oximetry  98 %  









 2010  7:38pm  Height  62.5 inches  5'2.50"









 Weight  142.75 lb  

 

 Heart Rate  68 /min  

 

 BP Systolic  132 mmHg  

 

 BP Diastolic  70 mmHg  

 

 BMI (Body Mass Index)  25.7 kg/m2  







Results







 Test  Date  Facility  Test  Result  H/L  Range  Note

 

 Urinalysis Profile  2019  [facility]  Urine Color  Yellow    
  



     [address]          



     [place] [address] [phone]          









 Urine Appearance  Cloudy      

 

 Urine Specific Gravity  1.013  N  1.010-1.030  

 

 Urine pH  7.0  N  5-9  

 

 Urine Urobilinogen  Negative    Negative  

 

 Urine Ketones  Negative    Negative  

 

 Urine Protein  Negative    Negative  

 

 Urine Leukocytes  Trace  Abnormal  Negative  

 

 Urine Blood  Negative    Negative  

 

 *  *  Abnormal  Negative  1

 

 Urine Nitrite  Negative    Negative  

 

 Urine Bilirubin  Negative    Negative  

 

 Urine Glucose  Negative    Negative  

 

 Urine White Blood Cell  Trace(0-5/hpf)    Absent  

 

 Urine Red Blood Cell  Trace(0-2/hpf)    Absent  

 

 Urine Bacteria  Absent    Absent  

 

 Urine Squamous Epithelial Cell  Present  Abnormal  Absent  









 CBC Auto Diff  2019  [facility]  White Blood  6.9 10^3/uL  N  
3.5-10.8  



     [address]  Count        



     [place] [address] [phone]          









 Red Blood Count  4.12 10^6/uL  N  3.70-4.87  

 

 Hemoglobin  12.7 g/dL  N  12.0-16.0  

 

 Hematocrit  38 %  N  35-47  

 

 Mean Corpuscular Volume  93 fL  N  80-97  

 

 Mean Corpuscular Hemoglobin  31 pg  N  27-31  

 

 Mean Corpuscular HGB Conc  33 g/dL  N  31-36  

 

 Red Cell Distribution Width  16 %  High  10.5-15  

 

 Platelet Count  245 10^3/uL  N  150-450  

 

 Mean Platelet Volume  8.4 fL  N  7.4-10.4  

 

 Abs Neutrophils  4.2 10^3/uL  N  1.5-7.7  

 

 Abs Lymphocytes  2.1 10^3/uL  N  1.0-4.8  

 

 Abs Monocytes  0.5 10^3/uL  N  0-0.8  

 

 Abs Eosinophils  0.1 10^3/uL  N  0-0.6  

 

 Abs Basophils  0.0 10^3/uL  N  0-0.2  

 

 Abs Nucleated RBC  0.0 10^3/uL      

 

 Granulocyte %  60.4 %      

 

 Lymphocyte %  29.6 %      

 

 Monocyte %  7.6 %      

 

 Eosinophil %  1.9 %      

 

 Basophil %  0.5 %      

 

 Nucleated Red Blood Cells %  0.0      









 Inr/Protime  2019  [facility]  Inr  0.93  N  0.82-1.09  2



     [address]          



     [place] [address] [phone]          

 

 Laboratory test  2019  [facility]  Partial  32.6 seconds  N  
26.0-38.0  



 finding    [address]  [address]        



     [place] [address]  PTT        



     [phone]          

 

 Type & Screen  2019  [facility]  Patient  A Positive      



     [address]  Blood Type        



     [place] [address] [phone]          









 Antibody Screen  NEGATIVE      









 Urine Culture And  2019  [facility]  Urine Culture  SEE 
RESULT      3



 Sensitivities    [address]    BELOW      



     [place] [address] [phone]          

 

 Lipid Profile  2019  [facility]  Triglycerides  132 mg/dL    
  4



 (Trig/Chol/HDL)    [address]          



     [place] [address] [phone]          









 Cholesterol  143 mg/dL      5

 

 HDL Cholesterol  46.8 mg/dL      6

 

 LDL Cholesterol  70 mg/dL      7









 Comp Metabolic Panel  2019  [facility]  Sodium  141 mmol/L  N
  135-145  



     [address]          



     [place] [address] [phone]          









 Potassium  4.1 mmol/L  N  3.5-5.0  

 

 Chloride  105 mmol/L  N  101-111  

 

 Co2 Carbon Dioxide  30 mmol/L  N  22-32  

 

 Anion Gap  6 mmol/L  N  2-11  

 

 Glucose  94 mg/dL  N    

 

 Blood Urea Nitrogen  12 mg/dL  N  6-24  

 

 Creatinine  0.66 mg/dL  N  0.51-0.95  

 

 BUN/Creatinine Ratio  18.2  N  8-20  

 

 Calcium  9.3 mg/dL  N  8.6-10.3  

 

 Total Protein  6.3 g/dL  Low  6.4-8.9  

 

 Albumin  3.9 g/dL  N  3.2-5.2  

 

 Globulin  2.4 g/dL  N  2-4  

 

 Albumin/Globulin Ratio  1.6  N  1-3  

 

 Total Bilirubin  0.50 mg/dL  N  0.2-1.0  

 

 Alkaline Phosphatase  73 U/L  N    

 

 Alt  24 U/L  N  7-52  

 

 Ast  23 U/L  N  13-39  

 

 Egfr Non-  84.5    >60  

 

 Egfr   102.3    >60  8









 Xray  2019  [facility]  Inj/Aspir Major  <pending>      



       JT Or Bursa W/ US        

 

 Comp Metabolic  2018  [facility]  Sodium  139 mmol/L  N  139-
145  



 Panel    [address] [place] [address] [phone]          









 Potassium  4.5 mmol/L  N  3.5-5.0  

 

 Chloride  105 mmol/L  N  101-111  

 

 Co2 Carbon Dioxide  27 mmol/L  N  22-32  

 

 Anion Gap  7 mmol/L  N  2-11  

 

 Glucose  87 mg/dL  N    

 

 Blood Urea Nitrogen  14 mg/dL  N  6-24  

 

 Creatinine  0.64 mg/dL  N  0.51-0.95  

 

 BUN/Creatinine Ratio  21.9  High  8-20  

 

 Calcium  9.6 mg/dL  N  8.6-10.3  

 

 Total Protein  6.2 g/dL  Low  6.4-8.9  

 

 Albumin  3.9 g/dL  N  3.2-5.2  

 

 Globulin  2.3 g/dL  N  2-4  

 

 Albumin/Globulin Ratio  1.7  N  1-3  

 

 Total Bilirubin  0.40 mg/dL  N  0.2-1.0  

 

 Alkaline Phosphatase  56 U/L  N    

 

 Alt  22 U/L  N  7-52  

 

 Ast  21 U/L  N  13-39  

 

 Egfr Non-  87.8    >60  

 

 Egfr   112.9    >60  9









 Lipid Profile  2018  [facility]  Triglycerides  169 mg/dL    
  10



 (Trig/Chol/HDL)    [address]          



     [place] [address] [phone]          









 Cholesterol  140 mg/dL      11

 

 HDL Cholesterol  42.0 mg/dL      12

 

 LDL Cholesterol  64 mg/dL      13









 Laboratory test  2018  [facility]  Erythrocyte Sed  15 mm/Hr  
N  0-40  



 finding    [address]  Rate        



     [place] [address]          



     [phone]          

 

 Protein  2018  [facility]  Total  6.7 g/dL    6.3 -  



 Electrophoresis    [address]  Protein(Pep)      7.9  



     [place] [address] [phone]          









 Albumin  3.2 g/dL  Abnormal  3.4-4.7  

 

 Alpha-1 Globulin  0.2 g/dL    0.1-0.3  

 

 Alpha-2 Globulin  1.2 g/dL  Abnormal  0.6-1.0  

 

 Beta Globulin  1.2 g/dL    0.7-1.2  

 

 Gamma Globulin  0.9 g/dL    0.6-1.6  

 

 Albumin/Globulin Ratio  0.93      

 

 Impression  See Comment      14









 CBC Auto Diff  2018  [facility]  White Blood  8.2 10^3/uL  N  
3.5-10.8  



     [address]  Count        



     [place] [address] [phone]          









 Red Blood Count  4.11 10^6/uL  N  4.0-5.4  

 

 Hemoglobin  12.8 g/dL  N  12.0-16.0  

 

 Hematocrit  39 %  N  35-47  

 

 Mean Corpuscular Volume  95 fL  N  80-97  

 

 Mean Corpuscular Hemoglobin  31 pg  N  27-31  

 

 Mean Corpuscular HGB Conc  33 g/dL  N  31-36  

 

 Red Cell Distribution Width  15 %  N  10.5-15  

 

 Platelet Count  244 10^3/uL  N  150-450  

 

 Mean Platelet Volume  9.2 um3  N  7.4-10.4  

 

 Abs Neutrophils  5.3 10^3/uL  N  1.5-7.7  

 

 Abs Lymphocytes  2.1 10^3/uL  N  1.0-4.8  

 

 Abs Monocytes  0.7 10^3/uL  N  0-0.8  

 

 Abs Eosinophils  0.1 10^3/uL  N  0-0.6  

 

 Abs Basophils  0.1 10^3/uL  N  0-0.2  

 

 Abs Nucleated RBC  0 10^3/uL      

 

 Granulocyte %  64.2 %  N  38-83  

 

 Lymphocyte %  25.5 %  N  25-47  

 

 Monocyte %  8.1 %  High  0-7  

 

 Eosinophil %  1.5 %  N  0-6  

 

 Basophil %  0.7 %  N  0-2  

 

 Nucleated Red Blood Cells %  0      









 Lipid Profile  2017  [facility]  Triglycerides  159 mg/dL  N 
   15



 (Trig/Chol/HDL)    [address]          



     [place] [address] [phone]          









 Cholesterol  140 mg/dL  N    16

 

 HDL Cholesterol  39.3 mg/dL  N    17

 

 LDL Cholesterol  69 mg/dL  N    18









 Comp Metabolic Panel  2017  [facility]  Sodium  138 mmol/L  N
  133-145  



     [address]          



     [place] [address] [phone]          









 Potassium  4.1 mmol/L  N  3.5-5.0  

 

 Chloride  107 mmol/L  N  101-111  

 

 Co2 Carbon Dioxide  29 mmol/L  N  22-32  

 

 Anion Gap  2 mmol/L  N  2-11  

 

 Glucose  92 mg/dL  N    

 

 Blood Urea Nitrogen  15 mg/dL  N  6-24  

 

 Creatinine  0.62 mg/dL  N  0.51-0.95  

 

 BUN/Creatinine Ratio  24.2  High  8-20  

 

 Calcium  9.1 mg/dL  N  8.6-10.3  

 

 Total Protein  6.2 g/dL  Low  6.4-8.9  

 

 Albumin  3.8 g/dL  N  3.2-5.2  

 

 Globulin  2.4 g/dL  N  2-4  

 

 Albumin/Globulin Ratio  1.6  N  1-3  

 

 Total Bilirubin  0.50 mg/dL  N  0.2-1.0  

 

 Alkaline Phosphatase  58 U/L  N    

 

 Alt  22 U/L  N  7-52  

 

 Ast  20 U/L  N  13-39  

 

 Egfr Non-  91.1  N  >60  

 

 Egfr   117.1  N  >60  19









 CBC Auto Diff  2017  [facility]  White Blood  7.1 10^3/uL  N  
3.5-10.8  



     [address]  Count        



     [place] [address] [phone]          









 Red Blood Count  4.19 10^6/uL  N  4.0-5.4  

 

 Hemoglobin  13.1 g/dL  N  12.0-16.0  

 

 Hematocrit  40 %  N  35-47  

 

 Mean Corpuscular Volume  96 fL  N  80-97  

 

 Mean Corpuscular Hemoglobin  31 pg  N  27-31  

 

 Mean Corpuscular HGB Conc  33 g/dL  N  31-36  

 

 Red Cell Distribution Width  15 %  N  10.5-15  

 

 Platelet Count  229 10^3/uL  N  150-450  

 

 Mean Platelet Volume  9 um3  N  7.4-10.4  

 

 Abs Neutrophils  4.3 10^3/uL  N  1.5-7.7  

 

 Abs Lymphocytes  2.0 10^3/uL  N  1.0-4.8  

 

 Abs Monocytes  0.6 10^3/uL  N  0-0.8  

 

 Abs Eosinophils  0.1 10^3/uL  N  0-0.6  

 

 Abs Basophils  0.1 10^3/uL  N  0-0.2  

 

 Abs Nucleated RBC  0 10^3/uL  N    

 

 Granulocyte %  61.0 %  N  38-83  

 

 Lymphocyte %  28.3 %  N  25-47  

 

 Monocyte %  8.1 %  N  1-9  

 

 Eosinophil %  1.9 %  N  0-6  

 

 Basophil %  0.7 %  N  0-2  

 

 Nucleated Red Blood Cells %  0  N    









 Urine Culture And  2017  [facility]  Urine Culture  SEE 
RESULT      20



 Sensitivities    [address] [place] [address] [phone]          

 

 Ua Routine  2017  [facility]  Ua Specific  1.005      



       Gravity        









 Ua PH  8      

 

 Ua Color  yellow      

 

 Ua Appera  clear      

 

 Ua WBC  trace      

 

 Ua Protein  neg      

 

 Ua Glucose  neg      

 

 Ua Ketones  neg      

 

 Ua Bilirubin  neg      

 

 Ua Urobilinogen  neg      

 

 Ua Nitrite  neg      

 

 Ua Occult Blood  neg      









 Lipid Profile  12/15/2016  [facility]  Triglycerides  137 mg/dL  N 
   21



 (Trig/Chol/HDL)    [address]          



     [place] [address] [phone]          









 Cholesterol  147 mg/dL  N    22

 

 HDL Cholesterol  48.2 mg/dL  N    23

 

 LDL Cholesterol  71 mg/dL  N    24









 Comp Metabolic Panel  12/15/2016  [facility]  Sodium  140 mmol/L  N
  133-145  



     [address]          



     [place] [address] [phone]          









 Potassium  4.2 mmol/L  N  3.5-5.0  

 

 Chloride  105 mmol/L  N  101-111  

 

 Co2 Carbon Dioxide  29 mmol/L  N  22-32  

 

 Anion Gap  6 mmol/L  N  2-11  

 

 Glucose  92 mg/dL  N    

 

 Blood Urea Nitrogen  15 mg/dL  N  6-24  

 

 Creatinine  0.62 mg/dL  N  0.51-0.95  

 

 BUN/Creatinine Ratio  24.2  High  8-20  

 

 Calcium  9.2 mg/dL  N  8.6-10.3  

 

 Total Protein  6.5 g/dL  N  6.4-8.9  

 

 Albumin  4.0 g/dL  N  3.2-5.2  

 

 Globulin  2.5 g/dL  N  2-4  

 

 Albumin/Globulin Ratio  1.6  N  1-3  

 

 Total Bilirubin  0.50 mg/dL  N  0.2-1.0  

 

 Alkaline Phosphatase  58 U/L  N    

 

 Alt  25 U/L  N  7-52  

 

 Ast  20 U/L  N  13-39  

 

 Egfr Non-  91.3  N  >60  

 

 Egfr   117.4  N  >60  25









 Comp Metabolic Panel  2015  [facility]  Sodium  140 mmol/L  N
  133-145  



     [address]  [place] [address] [phone]          









 Potassium  4.1 mmol/L  N  3.5-5.0  

 

 Chloride  105 mmol/L  N  101-111  

 

 Co2 Carbon Dioxide  28 mmol/L  N  22-32  

 

 Anion Gap  7 mmol/L  N  2-11  

 

 Glucose  96 mg/dL  N    

 

 Blood Urea Nitrogen  15 mg/dL  N  6-24  

 

 Creatinine  0.72 mg/dL  N  0.51-0.95  

 

 BUN/Creatinine Ratio  20.8  High  8-20  

 

 Calcium  9.3 mg/dL  N  8.6-10.3  

 

 Total Protein  6.5 g/dL  N  6.4-8.9  

 

 Albumin  4.1 g/dL  N  3.2-5.2  

 

 Globulin  2.4 g/dL  N  2-4  

 

 Albumin/Globulin Ratio  1.7  N  1-3  

 

 Total Bilirubin  0.50 mg/dL  N  0.2-1.0  

 

 Alkaline Phosphatase  58 U/L  N    

 

 Alt  24 U/L  N  7-52  

 

 Ast  22 U/L  N  13-39  

 

 Egfr Non-  77.0  N  >60  

 

 Egfr   99.0  N  >60  26









 Lipid Profile  2015  [facility]  Triglycerides  149 mg/dL  N 
   27



 (Trig/Chol/HDL)    [address]          



     [place] [address] [phone]          









 Cholesterol  157 mg/dL  N    28

 

 HDL Cholesterol  47.7 mg/dL  N    29

 

 LDL Cholesterol  80 mg/dL  N    30









 CKMB  2015  [facility]  CKMB  ng/mL  1.7 ng/mL  N  0.6-6.3  



     [address]          



     [place] [address] [phone]          

 

 Laboratory test  2015  [facility]  Troponin-I  0.00 ng/mL  N  
<0.03  31



 finding    [address])        



     [place] [address] [phone]          

 

 CBC Auto Diff  2015  [facility]  White Blood  6.5 10^3/uL  N  
3.5-10.8  



     [address]  Count        



     [place] [address] [phone]          









 Red Blood Count  4.28 10^6/uL  N  4.0-5.4  

 

 Hemoglobin  13.8 g/dL  N  12.0-16.0  

 

 Hematocrit  42 %  N  35-47  

 

 Mean Corpuscular Volume  98 fL  High  80-97  

 

 Mean Corpuscular Hemoglobin  32 pg  High  27-31  

 

 Mean Corpuscular HGB Conc  33 g/dL  N  31-36  

 

 Red Cell Distribution Width  14 %  N  10.5-15  

 

 Platelet Count  218 10^3/uL  N  150-450  

 

 Mean Platelet Volume  9 um3  N  7.4-10.4  

 

 Abs Neutrophils  4.1 10^3/uL  N  1.5-7.7  

 

 Abs Lymphocytes  1.8 10^3/uL  N  1.0-4.8  

 

 Abs Monocytes  0.5 10^3/uL  N  0-0.8  

 

 Abs Eosinophils  0.1 10^3/uL  N  0-0.6  

 

 Abs Basophils  0 10^3/uL  N  0-0.2  

 

 Abs Nucleated RBC  0 10^3/uL  N    

 

 Granulocyte %  62.2 %  N  38-83  

 

 Lymphocyte %  27.8 %  N  25-47  

 

 Monocyte %  7.8 %  N  1-9  

 

 Eosinophil %  1.6 %  N  0-6  

 

 Basophil %  0.6 %  N  0-2  

 

 Nucleated Red Blood Cells %  0  N    









 Laboratory test  2015  [facility]  B-Type Natriuretic  74 pg/
mL  N    32



 finding    [address]  Peptide BNP        



     [place] [address] [phone]          

 

 Lipid Profile  2014  [facility]  Triglycerides  202 mg/dL  N 
   33, 34



 (Trig/Chol/HDL)    [address]          



     [place] [address] [phone]          









 Cholesterol  152 mg/dL  N    35

 

 HDL Cholesterol  44.5 mg/dL  N    36

 

 LDL Cholesterol  67 mg/dL  N    37









 Comp Metabolic Panel  2014  [facility]  Sodium  139 mmol/L  N
  133-145  



     [address]          



     [place] [address] [phone]          









 Potassium  4.2 mmol/L  N  3.5-5.0  38

 

 Chloride  104 mmol/L  N  101-111  

 

 Co2 Carbon Dioxide  30 mmol/L  N  22-32  

 

 Anion Gap  5 mmol/L  N  2-11  

 

 Glucose  93 mg/dL  N    

 

 Blood Urea Nitrogen  10 mg/dL  N  6-24  

 

 Creatinine  0.66 mg/dL  N  0.51-0.95  

 

 BUN/Creatinine Ratio  15.2  N  8-20  

 

 Calcium  9.3 mg/dL  N  8.6-10.3  

 

 Total Protein  6.3 g/dL  Low  6.4-8.9  

 

 Albumin  3.9 g/dL  N  3.2-5.2  

 

 Globulin  2.4 g/dL  N  2-4  

 

 Albumin/Globulin Ratio  1.6  N  1-3  

 

 Total Bilirubin  0.50 mg/dL  N  0.2-1.0  

 

 Alkaline Phosphatase  63 U/L  N    

 

 Alt  24 U/L  N  7-52  

 

 Ast  22 U/L  N  13-39  

 

 Egfr Non-  85.3  N  >60  

 

 Egfr   109.7  N  >60  39









 Throat-Beta  2014  [facility]  Throat Beta Strep  (SEE      40
, 41



 Strept    [address]  Culture  NOTE)      



     [place] [address] [phone]          

 

 Surgical  2014  [facility]  S  RUN DATE:      42



 Pathology    [address]    /      



     [place] [address]    <SEE      



     [phone]    NOTE>      

 

 Surgical  04/15/2014  [facility]  S  RUN DATE:      43



 Pathology    [address]    04/15/      



     [place] [address]    <SEE      



     [phone]    NOTE>      

 

 Lipid Profile  10/10/2013  [facility]  Triglycerides  155 mg/dL    
40-2  



 (Trig/Chol/HDL)    [address]        [address]  



     [place] [address] [phone]          









 Cholesterol  149 mg/dL    Less than 200  

 

 HDL Cholesterol  47 mg/dL    40-60  44

 

 Cholesterol/HDL Ratio  3.2 Average    1-4.44  

 

 LDL Cholesterol  71.0    Less Than 100  45









 Comp Metabolic Panel  10/10/2013  [facility]  Sodium  140 mmol/L    
133-145  



     [address]          



     [place] [address] [phone]          









 Potassium  4.1 mmol/L    3.5-5.0  

 

 Chloride  107 mmol/L    101-111  

 

 Co2 Carbon Dioxide  29.0 mmol/L    22-32  

 

 Anion Gap  4.0 mmol/L    2-11  

 

 Glucose  97 mg/dL      

 

 Blood Urea Nitrogen  10 mg/dL    6-24  

 

 Creatinine  0.60 mg/dL    0.50-1.40  

 

 BUN/Creatinine Ratio  16.7    8-20  

 

 Calcium  9.1 mg/dL    8.1-9.9  

 

 Total Protein  6.1 g/dL  Low  6.2-8.1  

 

 Albumin  3.6 g/dL    3.2-5.2  

 

 Globulin  2.5 g/dL    2-4  

 

 Albumin/Globulin Ratio  1.4    1-3  

 

 Total Bilirubin  0.5 mg/dL    0.4-1.5  

 

 Alkaline Phosphatase  63 U/L      

 

 Alt  29 U/L    14-54  

 

 Ast  29 U/L    12-42  

 

 Egfr Non-  95.5    >60  

 

 Egfr   122.8    >60  46









 CBC W/Electronic  2013  [facility]  White Blood  6.9 10^3/uL 
   4.8-10.8  



 Diff    [address]  Count        



     [place] [address] [phone]          









 Red Blood Count  4.12 10^6/uL    4.0-5.4  

 

 Hemoglobin  13.2 g/dL    12.0-16.0  

 

 Hematocrit  39 %    35-47  

 

 Mean Corpuscular Volume  95 fL    80-97  

 

 Mean Corpuscular Hemoglobin  32 pg  High  27-31  

 

 Mean Corpuscular HGB Conc  34 g/dL    31-36  

 

 Red Cell Distribution Width  14 %    10.5-15  

 

 Platelet Count  245 10^3/uL    150-450  

 

 Mean Platelet Volume  9 um3    7.4-10.4  

 

 Abs Neutrophils  4.3 10^3/uL    1.5-7.7  

 

 Abs Lymphocytes  2.0 10^3/uL    1.0-4.8  

 

 Abs Monocytes  0.5 10^3/uL    0-0.8  

 

 Abs Eosinophils  0.2 10^3/uL    0-0.6  

 

 Abs Basophils  0 10^3/uL    0-0.2  

 

 Abs Nucleated RBC  0 10^3/uL      

 

 Granulocyte %  61.6 %    38-83  

 

 Lymphocyte %  28.9 %    25-47  

 

 Monocyte %  6.8 %    1-9  

 

 Eosinophil %  2.2 %    0-6  

 

 Basophil %  0.5 %    0-2  

 

 Nucleated Red Blood Cells %  0      









 CMP Panel  2013  [facility]  Sodium  141 mmol/L    133-145  



     [address]          



     [place] [address] [phone]          









 Potassium  4.0 mmol/L    3.5-5.0  

 

 Chloride  107 mmol/L    101-111  

 

 Co2 Carbon Dioxide  27.0 mmol/L    22-32  

 

 Anion Gap  7.0 mmol/L    2-11  

 

 Glucose  142 mg/dL  High    

 

 Blood Urea Nitrogen  12 mg/dL    6-24  

 

 Creatinine  0.70 mg/dL    0.50-1.40  

 

 BUN/Creatinine Ratio  17.1    8-20  

 

 Calcium  9.6 mg/dL    8.1-9.9  

 

 Total Protein  6.2 g/dL    6.2-8.1  

 

 Albumin  3.6 g/dL    3.2-5.2  

 

 Globulin  2.6 g/dL    2-4  

 

 Albumin/Globulin Ratio  1.4    1-3  

 

 Total Bilirubin  0.7 mg/dL    0.4-1.5  

 

 Alkaline Phosphatase  62 U/L      

 

 Alt  32 U/L    14-54  

 

 Ast  27 U/L    12-42  

 

 Egfr Non-  79.9    >60  

 

 Egfr   102.8    >60  47









 Ua Routine  2013  [facility]  Ua Specific Gravity  1.005      









 Ua PH  5.0      

 

 Ua Color  neg      

 

 Ua Appera  neg      

 

 Ua WBC  neg      

 

 Ua Protein  neg      

 

 Ua Glucose  neg      

 

 Ua Ketones  neg      

 

 Ua Bilirubin  neg      

 

 Ua Urobilinogen  neg      

 

 Ua Nitrite  neg      

 

 Ua Occult Blood  neg      









 Laboratory test finding  10/23/2012  [facility]  Alt  26 U/L    14-
54  48



     [address] [place] [address] [phone]          









 Ast  25 U/L    12-42  49

 

 TSH (Thyroid Stimulating Horm)  2.57 MIU/ML    0.34-5.60  50

 

 Vitamin B12  336 pg/mL    180-914  51









 Lipid Profile  10/23/2012  [facility]  Triglycerides  95 mg/dL    40
-200  



 (Trig/Chol/HDL)    [address] [place] [address] [phone]          









 Cholesterol  157 mg/dL    Less than 200  52

 

 HDL Cholesterol  57 mg/dL    40-60  53

 

 Cholesterol/HDL Ratio  2.8 AVERAGE    1-4.44  

 

 LDL Cholesterol  81.0 mg/dL    Less Than 100  









 Comp Metabolic Panel  2012  [facility]  Sodium  140 mmol/L    
135-145  



     [address] [place] [address] [phone]          









 Potassium  4.1 mmol/L    3.5-5.0  

 

 Chloride  105 mmol/L    101-111  

 

 Co2 (Carbon Dioxide)  31.0 mmol/L    22-32  

 

 Anion Gap  4.0 mmol/L    2-11  54

 

 Glucose  96 mg/dL      

 

 BUN  16 mg/dL    6-24  

 

 Creatinine  0.8 mg/dL    0.50-1.40  

 

 One Over Creatinine  1.25      

 

 BUN/Creatinine Ratio  20.0    8-20  

 

 Calcium  9.3 mg/dL    8.1-9.9  

 

 Total Protein  6.2 GM/DL    6.2-8.1  

 

 Albumin  3.8 GM/DL    3.2-5.2  

 

 Globulin  2.4 GM/DL    2-4  

 

 Albumin/Globulin Ratio  1.6    1-3  

 

 Bilirubin Total  0.5 mg/dL    0.4-1.5  55

 

 Alkaline Phosphatase  77 U/L      

 

 Alt (SGPT)  28 U/L    14-54  

 

 Ast (Sgot)  27 U/L    12-42  

 

 eGFR Non-  68.8    > 60  

 

 eGFR   88.5    > 60  56









 Lipid Profile  2012  [facility]  Triglyceride  137 mg/dL    40
-200  



 (Trig/Chol/HDL)    [address]  [place] [address] [phone]          









 Cholesterol  184 mg/dL    Less Than 200  57

 

 High Density Lipoprotein  50 mg/dL    40-60  58

 

 Cholesterol/HDL Ratio  3.68 AVERAGE    1-4.44  

 

 Low Density Lipoprotein  107 mg/dL  High  Less Than 100  59









 Lipid Profile  2011  [facility]  Triglyceride  92 mg/dL    40-
200  



 (Trig/Chol/HDL)    [address]  [place] [address] [phone]          









 Cholesterol  156 mg/dL    Less Than 200  60

 

 High Density Lipoprotein  46 mg/dL    40-60  61

 

 Cholesterol/HDL Ratio  3.39 AVERAGE    1-4.44  

 

 Low Density Lipoprotein  92 mg/dL    Less Than 100  62









 Comp Metabolic Panel  2011  [facility]  Sodium  142 mmol/L    
135-145  



     [address]  [place] [address] [phone]          









 Potassium  4.6 mmol/L    3.5-5.0  

 

 Chloride  107 mmol/L    101-111  

 

 Co2 (Carbon Dioxide)  30.0 mmol/L    22-32  

 

 Anion Gap  5.0 mmol/L    2-11  63

 

 Glucose  103 mg/dL  High    

 

 BUN  12 mg/dL    6-24  

 

 Creatinine  0.7 mg/dL    0.50-1.40  

 

 One Over Creatinine  1.42      

 

 BUN/Creatinine Ratio  17.1    8-20  

 

 Calcium  9.4 mg/dL    8.1-9.9  

 

 Total Protein  6.3 GM/DL    6.2-8.1  

 

 Albumin  3.7 GM/DL    3.2-5.2  

 

 Globulin  2.6 GM/DL    2-4  

 

 Albumin/Globulin Ratio  1.4    1-3  

 

 Bilirubin Total  0.5 mg/dL    0.4-1.5  64

 

 Alkaline Phosphatase  61 U/L      

 

 Alt (SGPT)  25 U/L    14-54  

 

 Ast (Sgot)  28 U/L    12-42  

 

 eGFR Non-  80.3    > 60  

 

 eGFR   103.3    > 60  65









 Manual Differential  2011  [facility]  Polysegmented  82 %    
38-83  



     [address]  Neutrophil        



     [place] [address] [phone]          









 Band Neutrophil  4 %    0-8  

 

 Lymphocyte  11 %  Low  25-47  

 

 Monocyte  3 %    0-13  

 

 Absolute Neutrophil Count  9.9      

 

 RBC Morphology  NORMAL      









 CBC With  2011  [facility]  White Blood  11.6 CUMM  High  4.8-
10.8  



 Electronic Diff    [address]  Count        



     [place] [address] [phone]          









 Red Cell Count  3.99 CUMM  Low  4.2-5.4  

 

 Hemoglobin  12.9 g/dL    12.0-16.0  

 

 Hematocrit  38 %    35-47  

 

 Mean Corpuscular Volume  95 um3    79-97  

 

 Mean Corpuscular Hemoglob  32 pg  High  27-31  

 

 Mean Corpuscular HGB Cone  34 g/dL    32-36  

 

 Redcell Distribution WDTH  14 %    10.5-15  

 

 Platelet Count  259 CUMM    150-450  

 

 Mean Platelet Volume  8.4 um3    7.4-10.4  66









 Comp Metabolic Panel  2011  [facility]  Sodium  137 mmol/L    
135-145  



     [address]          



     [place] [address] [phone]          









 Potassium  4.4 mmol/L    3.5-5.0  

 

 Chloride  101 mmol/L    101-111  

 

 Co2 (Carbon Dioxide)  27.0 mmol/L    22-32  

 

 Anion Gap  9.0 mmol/L    2-11  67

 

 Glucose  80 mg/dL      

 

 BUN  10 mg/dL    6-24  

 

 Creatinine  0.80 mg/dL    0.50-1.40  

 

 One Over Creatinine  1.20      

 

 BUN/Creatinine Ratio  12.5    8-20  

 

 Calcium  9.1 mg/dL    8.1-9.9  

 

 Total Protein  6.1 GM/DL  Low  6.2-8.1  

 

 Albumin  3.4 GM/DL    3.2-5.2  

 

 Globulin  2.7 GM/DL    2-4  

 

 Albumin/Globulin Ratio  1.3    1-3  

 

 Bilirubin Total  0.7 mg/dL    0.4-1.5  68

 

 Alkaline Phosphatase  75 U/L      

 

 Alt (SGPT)  24 U/L    14-54  

 

 Ast (Sgot)  25 U/L    12-42  

 

 eGFR Non-  73.4    > 60  

 

 eGFR   88.8    > 60  69









 Urine Culture &  2010  [facility]  Urine Culture  NG      70



 Sensitivi    [address]  Sensitivi        



     [place] [address] [phone]          

 

 Laboratory test  2010  [facility]  Amylase  48 U/L      
71



 finding    [address]          



     [place] [address] [phone]          









 Lipase  18 U/L  Low  22-51  

 

 C Reactive Protein  2.3 mg/dL  High  Less Than 0.5  









 Laboratory test  2010  [facility]  Stool For  NEGATIVE    
Negative  



 finding    [address]  Blood        



     [place] [address] [phone]          

 

 CBC With  2010  [facility]  White Blood  6.6 CUMM    4.8-10.8
  



 Electronic Diff    [address]  Count        



     [place] [address] [phone]          









 Red Cell Count  4.11 CUMM  Low  4.2-5.4  

 

 Hemoglobin  13.4 g/dL    12.0-16.0  

 

 Hematocrit  39 %    35-47  

 

 Mean Corpuscular Volume  95 um3    79-97  

 

 Mean Corpuscular Hemoglob  33 pg  High  27-31  

 

 Mean Corpuscular HGB Cone  34 g/dL    32-36  

 

 Redcell Distribution WDTH  13 %    10.5-15  

 

 Platelet Count  239 CUMM    150-450  

 

 Mean Platelet Volume  7.4 um3    7.4-10.4  

 

 Gran %  70.8 %    38-83  

 

 Lymph %  18.7 %  Low  25-47  

 

 Mononuclear %  9.7 %  High  1-9  

 

 Eosinophil %  0.8 %    0-6  

 

 Basophil %  0 %    0-2  

 

 Abs Lymphs  1.2    1.0-4.8  

 

 Abs Mononuclear  0.6    0-0.8  

 

 Absolute Neutrophil Count  4.7    1.5-7.7  

 

 Abs Eosinophils  0.1    0-0.6  

 

 Abs Basophils  0    0-0.2  72









 Urinalysis W/Microscopic  2010  [facility]  Ua Color  YELLOW 
   Yellow  



     [address]  [place] [address] [phone]          









 Appearance-Urine  CLEAR    Clear  

 

 Specific Gravity-Ur  1.027    1.010-1.030  

 

 Esterase-Urine  1+  Abnormal  Negative  

 

 Nitrite  NEGATIVE    Negative  

 

 Urobilinogen-Ur-DIP  NEGATIVE    Negative  

 

 Protein-Urine  TRACE  Abnormal  Negative  

 

 PH-Urine  5.5    5-9  

 

 Blood-Urine  TRACE  Abnormal  Negative  

 

 Ketones-Urine  NEGATIVE    Negative  

 

 Bilirubin-Ur  NEGATIVE    Negative  

 

 Glucose-Urine  NEGATIVE    Negative  

 

 WBC-Urine  5-10  Abnormal  0-5  

 

 RBC-Urine  0-2    0-2  

 

 Mucus Urine  MODERATE    None  

 

 Epith Cells-Ur  FEW    None  









 Comp Metabolic Panel  2010  [facility]  Sodium  137 mmol/L    
135-145  



     [address]  [place] [address] [phone]          









 Potassium  4.3 mmol/L    3.5-5.0  

 

 Chloride  106 mmol/L    101-111  

 

 Co2 (Carbon Dioxide)  24.0 mmol/L    22-32  

 

 Anion Gap  7.0 mmol/L    2-11  73

 

 Glucose  110 mg/dL  High    

 

 BUN  18 mg/dL    6-24  

 

 Creatinine  0.70 mg/dL    0.50-1.40  

 

 One Over Creatinine  1.40      

 

 BUN/Creatinine Ratio  25.7  High  8-20  

 

 Calcium  8.8 mg/dL    8.1-9.9  

 

 Total Protein  6.2 GM/DL    6.2-8.1  

 

 Albumin  3.6 GM/DL    3.2-5.2  

 

 Globulin  2.6 GM/DL    2-4  

 

 Albumin/Globulin Ratio  1.4    1-3  

 

 Bilirubin Total  0.7 mg/dL    0.4-1.5  74

 

 Alkaline Phosphatase  62 U/L      

 

 Alt (SGPT)  23 U/L    14-54  

 

 Ast (Sgot)  25 U/L    12-42  

 

 eGFR Non-  85.6    > 60  

 

 eGFR   103.5    > 60  75









 Comp Metabolic Panel  2010  [facility]  Sodium  141 mmol/L    
135-145  



     [address]  [place] [address] [phone]          









 Potassium  4.1 mmol/L    3.5-5.0  

 

 Chloride  106 mmol/L    101-111  

 

 Co2 (Carbon Dioxide)  29.0 mmol/L    22-32  

 

 Anion Gap  6.0 mmol/L    2-11  76

 

 Glucose  85 mg/dL      77

 

 BUN  19 mg/dL    6-24  

 

 Creatinine  0.70 mg/dL    0.50-1.40  

 

 One Over Creatinine  1.40      

 

 BUN/Creatinine Ratio  27.1  High  8-20  

 

 Calcium  9.5 mg/dL    8.1-9.9  78

 

 Total Protein  6.4 GM/DL    6.2-8.1  

 

 Albumin  3.9 GM/DL    3.2-5.2  

 

 Globulin  2.5 GM/DL    2-4  

 

 Albumin/Globulin Ratio  1.6    1-3  

 

 Bilirubin Total  0.7 mg/dL    0.4-1.5  79

 

 Alkaline Phosphatase  52 U/L      

 

 Alt (SGPT)  23 U/L    14-54  

 

 Ast (Sgot)  22 U/L    12-42  

 

 eGFR Non-  85.6    > 60  

 

 eGFR   103.5    > 60  80









 Lipid Profile  2010  [facility]  Triglyceride  182 mg/dL    40
-200  



 (Trig/Chol/HDL)    [address] [place] [address] [phone]          









 Cholesterol  156 mg/dL    Less Than 200  81

 

 High Density Lipoprotein  37 mg/dL  Low  40-60  82

 

 Cholesterol/HDL Ratio  4.22 AVERAGE    1-4.44  

 

 Low Density Lipoprotein  83 mg/dL    Less Than 100  83









 1  *Ascorbic acid is present which may interfere with detection



   of blood.

 

 2  Standard intensity warfarin therapeutic range: 2.0-3.0



   High intensity warfarin therapeutic range: 2.5-3.5

 

 3  SEE RESULT BELOW



   -----------------------------------------------------------------------------
---------------



   Name:  SHIELA SHEETS                 : 1930    Attend Dr: Radha Collado MD



   Acct:  [id number]  Unit: [id number]  AGE: 89            Location:  [facility]



   Re19                        SEX: F             Status:    REG REF



   -----------------------------------------------------------------------------
---------------



   



   SPEC: [id number]         ALEAH:   1140    [facility] DR: Radha Collado MD



   REQ:  [id number]              RECD:   



   STATUS: COMP



   _



   SOURCE: URINE          SPDESC:



   ORDERED:  Urine Culture



   QUERIES:  Urine Source: Clean Catch



   



   -----------------------------------------------------------------------------
---------------



   Procedure                         Result                         Reported   
        Site



   -----------------------------------------------------------------------------
---------------



   Urine Culture  Final                                             19-
1213      ML



   No Growth (<1,000 CFU/mL)



   



   -----------------------------------------------------------------------------
---------------



   * ML - Main Lab



   .



   



   



   



   



   



   



   



   



   



   



   



   



   



   



   



   



   



   



   



   



   



   



   



   



   



   



   ** END OF REPORT **



   



   DEPARTMENT OF PATHOLOGY,  [address] [place] [address]



   Phone # [phone]      Fax #[phone]



   Onel Hernandez M.D. Director     [facility] # [id number]

 

 4  Desirable: <150



   Borderline High: 150-199



   High: 200-499



   Very High: >500

 

 5  Desirable: <200



   Borderline High: 200-239



   High: >239

 

 6  Low: <40



   Desirable: 40-60



   High: >60

 

 7  Desirable: <100



   Near Optimal: 100-129



   Borderline High: 130-159



   High: 160-189



   Very High: >189

 

 8  *******Because ethnic data is not always readily available,



   this report includes an eGFR for both -Americans and



   non- Americans.****



   The National Kidney Disease Education Program (NKDEP) does



   not endorse the use of the MDRD equation for patients that



   are not between the ages of 18 and 70, are pregnant, have



   extremes of body size, muscle mass, or nutritional status,



   or are non- or non-.



   According to the National Kidney Foundation, irrespective of



   diagnosis, the stage of the disease is based on the level of



   kidney function:



   Stage Description                      GFR(mL/min/1.73 m(2))



   1     Kidney damage with normal or decreased GFR       90



   2     Kidney damage with mild decrease in GFR          60-89



   3     Moderate decrease in GFR                         30-59



   4     Severe decrease in GFR                           15-29



   5     Kidney failure                       <15 (or dialysis)

 

 9  *******Because ethnic data is not always readily available,



   this report includes an eGFR for both -Americans and



   non- Americans.****



   The National Kidney Disease Education Program (NKDEP) does



   not endorse the use of the MDRD equation for patients that



   are not between the ages of 18 and 70, are pregnant, have



   extremes of body size, muscle mass, or nutritional status,



   or are non- or non-.



   According to the National Kidney Foundation, irrespective of



   diagnosis, the stage of the disease is based on the level of



   kidney function:



   Stage Description                      GFR(mL/min/1.73 m(2))



   1     Kidney damage with normal or decreased GFR       90



   2     Kidney damage with mild decrease in GFR          60-89



   3     Moderate decrease in GFR                         30-59



   4     Severe decrease in GFR                           15-29



   5     Kidney failure                       <15 (or dialysis)

 

 10  Desirable: <150



   Borderline High: 150-199



   High: 200-499



   Very High: >500

 

 11  Desirable: <200



   Borderline High: 200-239



   High: >239

 

 12  Low: <40



   Desirable: 40-60



   High: >60

 

 13  Desirable: <100



   Near Optimal: 100-129



   Borderline High: 130-159



   High: 160-189



   Very High: >189

 

 14  RESULT: No apparent monoclonal protein on serum electrophoresis.



   Test Performed by:



   [address] [id number]

 

 15  Desirable <150



   Borderline high 150-199



   High 200-499



   Very High >500

 

 16  Desirable <200



   Borderline high 200-239



   High >239

 

 17  Low <40



   Desirable: 40-60



   High: >60

 

 18  Desirable: <100 mg/dL



   Near Optimal: 100-129 mg/dL



   Borderline High: 130-159 mg/dL



   High: 160-189 mg/dL



   Very High: >189 mg/dL

 

 19  *******Because ethnic data is not always readily available,



   this report includes an eGFR for both -Americans and



   non- Americans.****



   The National Kidney Disease Education Program (NKDEP) does



   not endorse the use of the MDRD equation for patients that



   are not between the ages of 18 and 70, are pregnant, have



   extremes of body size, muscle mass, or nutritional status,



   or are non- or non-.



   According to the National Kidney Foundation, irrespective of



   diagnosis, the stage of the disease is based on the level of



   kidney function:



   Stage Description                      GFR(mL/min/1.73 m(2))



   1     Kidney damage with normal or decreased GFR       90



   2     Kidney damage with mild decrease in GFR          60-89



   3     Moderate decrease in GFR                         30-59



   4     Severe decrease in GFR                           15-29



   5     Kidney failure                       <15 (or dialysis)

 

 20  SEE RESULT BELOW



   -----------------------------------------------------------------------------
---------------



   Name:  SHIELA SHEETS                 : 1930    Attend Dr: 
Tracy Carlin MD



   Acct:  [id number]  Unit: [id number]  AGE: 87            Location:  [facility]



   Re17                        SEX: F             Status:    REG REF



   -----------------------------------------------------------------------------
---------------



   



   SPEC: [id number]         ALEAH:       SUBM DR: Tracy Carlin MD



   REQ:  [id number]              RECD:   



   STATUS: COMP



   _



   SOURCE: URINE          SPDESC:



   ORDERED:  Urine Culture



   COMMENTS: [id number]



   Urine Source: Random



   



   -----------------------------------------------------------------------------
---------------



   Procedure                         Result                         Reported   
        Site



   -----------------------------------------------------------------------------
---------------



   Urine Culture  Final                                             17-
1605      ML



   No Growth (<1,000 CFU/mL)



   



   -----------------------------------------------------------------------------
---------------



   * ML - MAIN LAB ([facility])



   .



   



   



   



   



   



   



   



   



   



   



   



   



   



   



   



   



   



   



   



   



   



   



   



   



   ** END OF REPORT **



   



   * ML=Testing performed at Main Lab



   DEPARTMENT OF PATHOLOGY,  [address] [place] [address]



   Phone # [phone]      Fax #[phone]



   Onel Hernandez M.D. Director     [facility] # [id number]

 

 21  Desirable <150



   Borderline high 150-199



   High 200-499



   Very High >500

 

 22  Desirable <200



   Borderline high 200-239



   High >239

 

 23  Low <40



   Desirable: 40-60



   High: >60

 

 24  Desirable: <100 mg/dL



   Near Optimal: 100-129 mg/dL



   Borderline High: 130-159 mg/dL



   High: 160-189 mg/dL



   Very High: >189 mg/dL

 

 25  *******Because ethnic data is not always readily available,



   this report includes an eGFR for both -Americans and



   non- Americans.****



   The National Kidney Disease Education Program (NKDEP) does



   not endorse the use of the MDRD equation for patients that



   are not between the ages of 18 and 70, are pregnant, have



   extremes of body size, muscle mass, or nutritional status,



   or are non- or non-.



   According to the National Kidney Foundation, irrespective of



   diagnosis, the stage of the disease is based on the level of



   kidney function:



   Stage Description                      GFR(mL/min/1.73 m(2))



   1     Kidney damage with normal or decreased GFR       90



   2     Kidney damage with mild decrease in GFR          60-89



   3     Moderate decrease in GFR                         30-59



   4     Severe decrease in GFR                           15-29



   5     Kidney failure                       <15 (or dialysis)

 

 26  *******Because ethnic data is not always readily available,



   this report includes an eGFR for both -Americans and



   non- Americans.****



   The National Kidney Disease Education Program (NKDEP) does



   not endorse the use of the MDRD equation for patients that



   are not between the ages of 18 and 70, are pregnant, have



   extremes of body size, muscle mass, or nutritional status,



   or are non- or non-.



   According to the National Kidney Foundation, irrespective of



   diagnosis, the stage of the disease is based on the level of



   kidney function:



   Stage Description                      GFR(mL/min/1.73 m(2))



   1     Kidney damage with normal or decreased GFR       90



   2     Kidney damage with mild decrease in GFR          60-89



   3     Moderate decrease in GFR                         30-59



   4     Severe decrease in GFR                           15-29



   5     Kidney failure                       <15 (or dialysis)

 

 27  Desirable <150



   Borderline high 150-199



   High 200-499



   Very High >500

 

 28  Desirable <200



   Borderline high 200-239



   High >239

 

 29  Low <40



   Desirable: 40-60



   High: >60

 

 30  Desirable: <100 mg/dL



   Near Optimal: 100-129 mg/dL



   Borderline High: 130-159 mg/dL



   High: 160-189 mg/dL



   Very High: >189 mg/dL

 

 31  Reference Range and Interpretation:



   TnI (ng/mL)             Interpretation



   Less Than 0.03 ng/mL    Not supportive of diagnosis of MI



   0.03 - 0.50 ng/mL       Indeterminate: suggest serial



   studies if clinically indicated.



   Greater than 0.5 ng/mL  Consistent with diagnosis of MI

 

 32  >100 to <200 pg/mL: likely compensated congestive heart



   failure (CHF)



   200 to 400 pg/mL: likely moderate CHF



   >400 pg/mL: likely moderate to severe CHF

 

 33  PT IS FASTING

 

 34  Desirable <150



   Borderline high 150-199



   High 200-499



   Very High >500

 

 35  Desirable <200



   Borderline high 200-239



   High >239

 

 36  Low <40



   Desirable: 40-60



   High: >60

 

 37  Desirable <100



   Near Optimal 100-129



   Borderline high 130-159



   High 160-189



   Very High >189

 

 38  **Potassium reference range changed effective 14**

 

 39  *******Because ethnic data is not always readily available,



   this report includes an eGFR for both -Americans and



   non- Americans.****



   The National Kidney Disease Education Program (NKDEP) does



   not endorse the use of the MDRD equation for patients that



   are not between the ages of 18 and 70, are pregnant, have



   extremes of body size, muscle mass, or nutritional status,



   or are non- or non-.



   According to the National Kidney Foundation, irrespective of



   diagnosis, the stage of the disease is based on the level of



   kidney function:



   Stage Description                      GFR(mL/min/1.73 m(2))



   1     Kidney damage with normal or decreased GFR       90



   2     Kidney damage with mild decrease in GFR          60-89



   3     Moderate decrease in GFR                         30-59



   4     Severe decrease in GFR                           15-29



   5     Kidney failure                       <15 (or dialysis)

 

 40  Comment: collected by micheline romano

 

 41  RUN DATE: 05/15/14               [facility] **LIVE**       
         PAGE    1



   RUN TIME: 0900             [address] [place], [place]   [address]



   Specimen Inquiry



   



   -----------------------------------------------------------------------------
---------------



   Name:  SHIELA SHEETS               : 1930    Attend Dr: Galdino Heredia MD



   Acct:  [id number]  Unit: [id number]  AGE: 83            Location:  [facility]



   Re14                        SEX: F             Status:    DEP ER



   -----------------------------------------------------------------------------
---------------



   



   SPEC: [id number]         ALEAH:       [facility] DR: Galdino Heredia MD



   REQ:  [id number]              RECD:   



   STATUS: TIMI SPARKS DR: Tracy Carlin MD



   _



   SOURCE: THROAT         SPDESC:



   ORDERED:  Throat Beta Str



   COMMENTS: Comment: collected by micheline romano



   



   -----------------------------------------------------------------------------
---------------



   Procedure                         Result                         Verified   
        Site



   -----------------------------------------------------------------------------
---------------



   Throat Beta Strep Culture  Final                                 05/15/14-
0900      ML



   Negative For Group A Beta Streptococcus



   



   -----------------------------------------------------------------------------
---------------



   



   



   



   



   



   



   



   



   



   



   



   



   



   



   



   



   



   



   



   



   



   



   



   



   



   



   



   



   ** END OF REPORT **



   



   * ML=Testing performed at Main Lab



   DEPARTMENT OF PATHOLOGY,  [facility] [employer] [place], [place] [address]



   Phone # [phone]      Fax #[phone]



   Onel Hernandez M.D. Director     [facility] # [id number]

 

 42  RUN DATE: 14               [facility] **LIVE**       
         PAGE    1



   RUN TIME: 1357             [facility] [employer] [place], [place]   [address]



   Specimen Inquiry



   



   -----------------------------------------------------------------------------
---------------



   Name:  KORTNEYSHIELA R               : 1930    Attend Dr: Mario Bergman MD



   Acct:  [id number]  Unit: [id number]  AGE: 83            Location:  OR



   Re14                        SEX: F             Status:    REG SDC



   -----------------------------------------------------------------------------
---------------



   



   SPEC: [id number]             ALEAH: 14-          [facility] DR: Mario Bergman MD



   REQ:  [id number]             RECD: 



   STATUS: DEBI SPARKS DR: Tracy Carlin MD



   _



   ORDERED:  CONSULT W/ FS, FS ADDITIONAL, LEVEL IV



   



   FINAL DIAGNOSIS



   



   Skin, right upper cutaneous lip, biopsy:



   A. Basal cell carcinoma, nodular type.



   B. Deep, tip, and lateral margins of resection are clear.



   



   



   



   



   



   



   PATHOLOGY SURGICAL CONSULT



   



   Frozen section (FS)/Touch Prep (TP)/Gross Consult (GC)



   



   FS: Right upper cutaneous lip:



   A. Basal cell carcinoma (DS).



   B. Margins clear (DS).



   



   



   PRE-OPERATIVE DIAGNOSIS



   



   Basal cell carcinoma right upper cutaneous lip, suture marks twelve o'clock 
superior margin.



   



   GROSS DESCRIPTION



   



   The specimen is received fresh labeled Shiela Sheets, Excision BCC 
Right Upper



   Cutaneous Lip Suture Marks Twelve O'clock Superior Margin and consists of a 
2.1 x 0.8 cm.



   tan-pink skin ellipse excised to a depth of 0.5 cm.  There is a suture 
attached to one long



   axis which designates Twelve o'clock.  The specimen is inked as follows: 
nine o'clock half -



   black, three o'clock half - blue, and twelve o'clock tip - green, serially 
sectioned from



   twelve o'clock to six o'clock and entirely submitted for frozen section 
microscopy in



   cassettes FSA and FSB.  The frozen section residue is entirely submitted in 
cassettes FSA



   and FSB to include ellipse ends in cassette FSA.



   



   Signed __________(signature on file)___________ Onel Hernandez MD  [id number]



   -----------------------------------------------------------------------------
---------------



   



   ** END OF REPORT **



   



   * ML=Testing performed at Main Lab



   DEPARTMENT OF PATHOLOGY,  [address] [place] [address]



   Phone # [phone]      Fax #[phone]



   Onel Hernandez M.D. Director     [place] [employer]  #[id number]

 

 43  RUN DATE: 04/15/14               [facility] **LIVE**       
         PAGE    1



   RUN TIME: 4525             [address] [facility],  [place], [place]   [address]



   Specimen Inquiry



   



   -----------------------------------------------------------------------------
---------------



   Name:  SHIELA SHEETS               : 1930    Attend Dr: Mario Bergman MD



   Acct:  [id number]  Unit: [id number]  AGE: 83            Location:  [facility]



   Re/15/14                        SEX: F             Status:    REG REF



   -----------------------------------------------------------------------------
---------------



   



   SPEC: [id number]             ALEAH: 04/15/14-          SUBM DR: Mario Bergman MD



   REQ:  [id number]             RECD: 04/15/



   STATUS: DEBI SPARKS DR: Tracy Ling MD



   _



   ORDERED:  Consult w/slide



   



   FINAL DIAGNOSIS



   



   Skin, right nasolabial fold, shave biopsy:



   A. Basal cell carcinoma, nodular type.



   B. Basal cell carcinoma is broadly transected along the base of this shave 
biopsy.



   



   



   



   



   



   



   PRE-OPERATIVE DIAGNOSIS



   



   Basal cell carcinoma.



   



   GROSS DESCRIPTION



   



   Received is one glass slide labeled [id number], Shiela Sheets [place] .
  Accompanying



   this slide is a pathology report labeled Tracy Sheets, [id number] [employer],



   [address] [place] [address], [address] [address] signed by Dr. Stuart Doss on



   2014.



   



   Signed __________(signature on file)___________ Onel Hernandez MD 04/15/
14 1518



   



   -----------------------------------------------------------------------------
---------------



   



   



   



   



   



   



   



   



   



   



   



   ** END OF REPORT **



   



   * ML=Testing performed at Main Lab



   DEPARTMENT OF PATHOLOGY,  [address] [place] [address]



   Phone # [phone]      Fax #[phone]



   Onel Hernandez M.D. Director     [place] [employer] Perm[employer]  #[id number]

 

 44  HDL Interpretation:



   Undesirable: High Risk:  Less than 40 mg/dL



   Desirable:  Low Risk:  Greater than 60 mg/dL

 

 45  LDL Interpretation:



   Low Risk Optimal Level:  LDL Less than 100 mg/dL



   Near or Above Optimal:  -129 mg/dL



   Borderline High Risk:  -159 mg/dL



   High Risk:  -189 mg/dL



   Very High Risk:  LDL Greater than 189 mg/dL

 

 46  *******Because ethnic data is not always readily available,



   this report includes an eGFR for both -Americans and



   non- Americans.****



   The National Kidney Disease Education Program (NKDEP) does



   not endorse the use of the MDRD equation for patients that



   are not between the ages of 18 and 70, are pregnant, have



   extremes of body size, muscle mass, or nutritional status,



   or are non- or non-.



   According to the National Kidney Foundation, irrespective of



   diagnosis, the stage of the disease is based on the level of



   kidney function:



   Stage Description                      GFR(mL/min/1.73 m(2))



   1     Kidney damage with normal or decreased GFR       90



   2     Kidney damage with mild decrease in GFR          60-89



   3     Moderate decrease in GFR                         30-59



   4     Severe decrease in GFR                           15-29



   5     Kidney failure                       <15 (or dialysis)

 

 47  *******Because ethnic data is not always readily available,



   this report includes an eGFR for both -Americans and



   non- Americans.****



   The National Kidney Disease Education Program (NKDEP) does



   not endorse the use of the MDRD equation for patients that



   are not between the ages of 18 and 70, are pregnant, have



   extremes of body size, muscle mass, or nutritional status,



   or are non- or non-.



   According to the National Kidney Foundation, irrespective of



   diagnosis, the stage of the disease is based on the level of



   kidney function:



   Stage Description                      GFR(mL/min/1.73 m(2))



   1     Kidney damage with normal or decreased GFR       90



   2     Kidney damage with mild decrease in GFR          60-89



   3     Moderate decrease in GFR                         30-59



   4     Severe decrease in GFR                           15-29



   5     Kidney failure                       <15 (or dialysis)

 

 48  PT IS FASTING

 

 49  PT IS FASTING

 

 50  PT IS FASTING

 

 51  PT IS FASTING

 

 52  Desirable:  Less than 200 MG/DL



   Borderline-High Risk:  200-239 MG/DL



   High-Risk:  240 MG/DL and over

 

 53  HDL Interpretation:



   Undesirable: High Risk:  Less than 40 MG/DL



   Desirable:  Low Risk:  Greater than 60 MG/DL

 

 54  Anion gap measurement may be of limited value in the



   presence of any alkalosis, especially in a combined acid



   base disorder.



   .

 

 55  A metabolite of Naproxen, O-desmethylnaproxen, has been



   shown to interfere with the Jendrassik-Anali method for



   measuring total bilirubin.  Samples from patients who have



   taken Naproxen have shown spurious elevation in total



   bilirubin levels.

 

 56  *******Because ethnic data is not always readily available,



   this report includes an eGFR for both -Americans and



   non- Americans.****



   The National Kidney Disease Education Program (NKDEP) does



   not endorse the use of the MDRD equation for patients that



   are not between the ages of 18 and 70, are pregnant, have



   extremes of body size, muscle mass, or nutritional status,



   or are non- or non-.



   According to the National Kidney Foundation, irrespective of



   diagnosis, the stage of the disease is based on the level of



   kidney function:



   Stage Description                      GFR(mL/min/1.73 m(2))



   1     Kidney damage with normal or decreased GFR       90



   2     Kidney damage with mild decrease in GFR          60-89



   3     Moderate decrease in GFR                         30-59



   4     Severe decrease in GFR                           15-29



   5     Kidney failure                       <15 (or dialysis)

 

 57  CHOLESTEROL INTERPRETATION:



   Desirable:  Less than 200 MG/DL



   Borderline-High Risk:  200-239 MG/DL



   High-Risk:  240 MG/DL and over

 

 58  HDL INTERPRETATION:



   Undesirable: High Risk:  Less than 40 MG/DL



   Desirable:  Low Risk:  Greater than 60 MG/DL

 

 59  LDL INTERPRETATION:



   Low Risk Optimal Level:  LDL Less than 100 MG/DL



   Near or Above Optimal:  -129 MG/DL



   Borderline High Risk:  -159 MG/DL



   High Risk:  -189 MG/DL



   Very High Risk:  LDL Greater than 189 MG/DL

 

 60  CHOLESTEROL INTERPRETATION:



   Desirable:  Less than 200 MG/DL



   Borderline-High Risk:  200-239 MG/DL



   High-Risk:  240 MG/DL and over

 

 61  HDL INTERPRETATION:



   Undesirable: High Risk:  Less than 40 MG/DL



   Desirable:  Low Risk:  Greater than 60 MG/DL

 

 62  LDL INTERPRETATION:



   Low Risk Optimal Level:  LDL Less than 100 MG/DL



   Near or Above Optimal:  -129 MG/DL



   Borderline High Risk:  -159 MG/DL



   High Risk:  -189 MG/DL



   Very High Risk:  LDL Greater than 189 MG/DL

 

 63  Anion gap measurement may be of limited value in the



   presence of any alkalosis, especially in a combined acid



   base disorder.



   .

 

 64  A metabolite of Naproxen, O-desmethylnaproxen, has been



   shown to interfere with the Jendrassik-Dickey method for



   measuring total bilirubin.  Samples from patients who have



   taken Naproxen have shown spurious elevation in total



   bilirubin levels.

 

 65  *******Because ethnic data is not always readily available,



   this report includes an eGFR for both -Americans and



   non- Americans.****



   The National Kidney Disease Education Program (NKDEP) does



   not endorse the use of the MDRD equation for patients that



   are not between the ages of 18 and 70, are pregnant, have



   extremes of body size, muscle mass, or nutritional status,



   or are non- or non-.



   According to the National Kidney Foundation, irrespective of



   diagnosis, the stage of the disease is based on the level of



   kidney function:



   Stage Description                      GFR(mL/min/1.73 m(2))



   1     Kidney damage with normal or decreased GFR       90



   2     Kidney damage with mild decrease in GFR          60-89



   3     Moderate decrease in GFR                         30-59



   4     Severe decrease in GFR                           15-29



   5     Kidney failure                       <15 (or dialysis)

 

 66  Lymphopenia %

 

 67  Anion gap measurement may be of limited value in the



   presence of any alkalosis, especially in a combined acid



   base disorder.



   .

 

 68  A metabolite of Naproxen, O-desmethylnaproxen, has been



   shown to interfere with the Jendrassik-Anali method for



   measuring total bilirubin.  Samples from patients who have



   taken Naproxen have shown spurious elevation in total



   bilirubin levels.

 

 69  *******Because ethnic data is not always readily available,



   this report includes an eGFR for both -Americans and



   non- Americans.****



   The National Kidney Disease Education Program (NKDEP) does



   not endorse the use of the MDRD equation for patients that



   are not between the ages of 18 and 70, are pregnant, have



   extremes of body size, muscle mass, or nutritional status,



   or are non- or non-.



   According to the National Kidney Foundation, irrespective of



   diagnosis, the stage of the disease is based on the level of



   kidney function:



   Stage Description                      GFR(mL/min/1.73 m(2))



   1     Kidney damage with normal or decreased GFR       90



   2     Kidney damage with mild decrease in GFR          60-89



   3     Moderate decrease in GFR                         30-59



   4     Severe decrease in GFR                           15-29



   5     Kidney failure                       <15 (or dialysis)

 

 70  FINAL: NO GROWTH DAY 2 (<1,000 CFU/mL)

 

 71  PLEASE NOTE NEW REFERENCE RANGE.

 

 72  Lymphopenia %

 

 73  Anion gap measurement may be of limited value in the



   presence of any alkalosis, especially in a combined acid



   base disorder.



   .

 

 74  A metabolite of Naproxen, O-desmethylnaproxen, has been



   shown to interfere with the Jendrassik-Dickey method for



   measuring total bilirubin.  Samples from patients who have



   taken Naproxen have shown spurious elevation in total



   bilirubin levels.

 

 75  *******Because ethnic data is not always readily available,



   this report includes an eGFR for both -Americans and



   non- Americans.****



   The National Kidney Disease Education Program (NKDEP) does



   not endorse the use of the MDRD equation for patients that



   are not between the ages of 18 and 70, are pregnant, have



   extremes of body size, muscle mass, or nutritional status,



   or are non- or non-.



   According to the National Kidney Foundation, irrespective of



   diagnosis, the stage of the disease is based on the level of



   kidney function:



   Stage Description                      GFR(mL/min/1.73 m(2))



   1     Kidney damage with normal or decreased GFR       90



   2     Kidney damage with mild decrease in GFR          60-89



   3     Moderate decrease in GFR                         30-59



   4     Severe decrease in GFR                           15-29



   5     Kidney failure                       <15 (or dialysis)

 

 76  Anion gap measurement may be of limited value in the



   presence of any alkalosis, especially in a combined acid



   base disorder.



   .

 

 77  ** Note change in reference range as of 08.  The



   change was based on recommendations from the American



   Diabetes Association.

 

 78  Please note change in reference range effective 08



   .

 

 79  A metabolite of Naproxen, O-desmethylnaproxen, has been



   shown to interfere with the Jendrassik-Anali method for



   measuring total bilirubin.  Samples from patients who have



   taken Naproxen have shown spurious elevation in total



   bilirubin levels.

 

 80  *******Because ethnic data is not always readily available,



   this report includes an eGFR for both -Americans and



   non- Americans.****



   The National Kidney Disease Education Program (NKDEP) does



   not endorse the use of the MDRD equation for patients that



   are not between the ages of 18 and 70, are pregnant, have



   extremes of body size, muscle mass, or nutritional status,



   or are non- or non-.



   According to the National Kidney Foundation, irrespective of



   diagnosis, the stage of the disease is based on the level of



   kidney function:



   Stage Description                      GFR(mL/min/1.73 m(2))



   1     Kidney damage with normal or decreased GFR       90



   2     Kidney damage with mild decrease in GFR          60-89



   3     Moderate decrease in GFR                         30-59



   4     Severe decrease in GFR                           15-29



   5     Kidney failure                       <15 (or dialysis)

 

 81  CHOLESTEROL INTERPRETATION:



   Desirable:  Less than 200 MG/DL



   Borderline-High Risk:  200-239 MG/DL



   High-Risk:  240 MG/DL and over

 

 82  HDL INTERPRETATION:



   Undesirable: High Risk:  Less than 40 MG/DL



   Desirable:  Low Risk:  Greater than 60 MG/DL

 

 83  LDL INTERPRETATION:



   Low Risk Optimal Level:  LDL Less than 100 MG/DL



   Near or Above Optimal:  -129 MG/DL



   Borderline High Risk:  -159 MG/DL



   High Risk:  -189 MG/DL



   Very High Risk:  LDL Greater than 189 MG/DL







Procedures







 Date  Code  Description  Status

 

 2019  46292  THR Total Hip Replacement  Completed

 

 2019  41957  THR Total Hip Replacement  Completed

 

 2019  489155002  Diabetic Retinal Eye Exam  Completed

 

 2019  71427  Destruction Of Benign Lesions Any Method 1-14 lesions  
Completed

 

 2019  90619  EKG Tracing & Interpretation  Completed

 

 2019  27470  ECHO Transthoracic, Real-Time 2D With Doppler And  Completed



     Color Flow  

 

 2019  34111  ECHO Transthoracic, Real-Time 2D With Doppler And  Completed



     Color Flow  

 

 2019  83288  Inj/Aspir Major JT Or Bursa W/ US  Completed

 

 2018  [id number]  Diabetic Retinal Eye Exam  Completed

 

 04/10/2018  39947  ECHO Transthoracic, Real-Time 2D With Doppler And  Completed



     Color Flow  

 

 04/10/2018  13804  ECHO Transthoracic, Real-Time 2D With Doppler And  Completed



     Color Flow  

 

 2017  63412  Destruction Of Benign Lesions Any Method 1-14 lesions  
Completed

 

 2017  12566  Destruction Of Benign Lesions Any Method 1-14 lesions  
Completed

 

 2017  [id number]  Bone Mineral Density Test  Completed

 

 2017  [id number]  Diabetic Retinal Eye Exam  Completed

 

 2016  30336  Stress Test  Completed

 

 2016  27680  Myocardial Perfusion Imaging Tomographic (Spect)  Completed



     Multiple Studies  

 

 2016  09984  Myocardial Perfusion Imaging Tomographic (Spect)  Completed



     Multiple Studies  

 

 2015  23610  EKG Tracing & Interpretation  Completed

 

 2015  [id number]  Mammogram  Completed

 

 2015  187009000  Bone Mineral Density Test  Completed

 

 2014  41895  ECHO Transthoracic, Real-Time 2D With Doppler And  Completed



     Color Flow  

 

 10/24/2013  [id number]  Mammogram  Completed

 

 10/23/2012  [id number]  Bone Mineral Density Test  Completed

 

 10/23/2012  [id number]  Mammogram  Completed

 

 10/18/2011  [id number]  Mammogram  Completed

 

 10/04/2011  81312  Stress Test Supervsn W/Out I/R  Completed

 

 10/04/2011  17493  Treadmill Interp/Report Only  Completed

 

 2011  02532  EKG Tracing & Interpretation  Completed

 

 2011  44359  Noninvasive Ear Or Pulse Oximetry For Oxygen  Completed



     Saturation  

 

 2011  95893  ECHO Transthoracic, Real-Time 2D With Doppler And  Completed



     Color Flow  

 

 2010  [id number]  Mammogram  Completed

 

 2009  [id number]  Bone Mineral Density Test  Completed

 

 2009  67534  EKG Tracing & Interpretation  Completed

 

 2008  67217  EKG Tracing & Interpretation  Completed

 

 2008  25001  EKG Tracing & Interpretation  Completed

 

 2001  [id number]  Colonoscopy  Completed







Encounters







 Type  Date  Location  Provider  Dx  Diagnosis

 

 Office Visit  2019  [facility]  [place]  Z01.818  Encounter for other



   8:40a  Janet Carlin M.D.    preprocedural



           examination









 I10  Essential (primary) hypertension

 

 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

 

 L98.9  Disorder of the skin and subcutaneous tissue, unspecified









 Office Visit  2019  [facility] PEREYRAN[facility]  Z01.810  Encounter for



   3:00p  [facility]  [facility],     preprocedural



     [facility]    cardiovascular



           examination









 I35.0  Nonrheumatic aortic (valve) stenosis

 

 I10  Essential (primary) hypertension

 

 I63.9  Cerebral infarction, unspecified

 

 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









 Office Visit  2019 10:30a  [facility]  Radha Collado,  M25.552  
Pain in left



     Of C.M.A.  M.D.    hip









 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









 Office Visit  2019  [facility]Not[facility]  [place]  I10  Essential



   2:40p  Monalisa Carlin M.D.    (primary)



           hypertension









 M25.552  Pain in left hip

 

 I35.0  Nonrheumatic aortic (valve) stenosis









 Office Visit  2019  1:30p  [facility]  Radha Collado,  M25.552  
Pain in left



     Of C.M.A.  M.D.    hip









 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









 Office Visit  2018  9:00a  [facility] Jo Carlin  M25.552  
Pain in left



     Janet Brewer M.D.    hip









 I10  Essential (primary) hypertension









 Office Visit  2018  3:00p  [facility]  [place]  Z00.00  Encntr for



     Janet Carlin M.D.    general adult



           medical exam



           w/o abnormal



           findings









 M54.9  Dorsalgia, unspecified









 Office Visit  07/10/2018 10:20a  [facility]  Mike Vicente MD  L72.0  
Epidermal cyst









 L82.1  Other seborrheic keratosis

 

 Z08  Encntr for follow-up exam after trtmt for malignant neoplasm

 

 Z85.828  Personal history of other malignant neoplasm of skin









 Office Visit  2018  9:40a  [facility]  [place]  M54.9  Dorsalgia,



     Janet Carlin M.D.    unspecified



     [facility]      









 M25.552  Pain in left hip

 

 I10  Essential (primary) hypertension

 

 E78.5  Hyperlipidemia, unspecified









 Office Visit  2018 10:20a  [facility]  [place]  I10  Essential (
primary)



     Janet Carlin M.D.    hypertension



     [facility]      









 J06.9  Acute upper respiratory infection, unspecified

 

 I34.0  Nonrheumatic mitral (valve) insufficiency









 Office Visit  2017 10:30a  [facility]  Mike Galindontzer,  L82.1  Other 
seborrheic



       MD    keratosis









 I78.8  Other diseases of capillaries

 

 R60.0  Localized edema

 

 B07.0  Plantar wart

 

 Z78.9  Other specified health status

 

 R20.8  Other disturbances of skin sensation

 

 S90.921A  Unspecified superficial injury of right foot, init encntr









 Office Visit  2017 10:00a  [facility]  [place]  I10  Essential (
primary)



     Janet Carlin M.D.    hypertension



     [facility]      









 K62.5  Hemorrhage of anus and rectum

 

 R35.0  Frequency of micturition

 

 K64.4  Residual hemorrhoidal skin tags









 Office Visit  2016 10:00a  [facility]  [place]  I10  Essential (
primary)



     Janet Carlin M.D.    hypertension



     [facility]      









 D23.9  Other benign neoplasm of skin, unspecified

 

 G47.00  Insomnia, unspecified









 Office Visit  10/31/2016 10:40a  [facility]  Donavon Araiza NP  J01.00  Acute 
maxillary



     Medicine - [facility]      sinusitis,



           unspecified

 

 Office Visit  2016  4:20p  [facility]  Lisseth Hardy  J01.00  Acute 
maxillary



     Medicine - [facility]  N.P.    sinusitis,



           unspecified

 

 Office Visit  2016  4:20p  [facility]  Lisesth Hardy  J01.00  Acute 
maxillary



     Medicine - [facility]  N.P.    sinusitis,



           unspecified

 

 Office Visit  2016  2:00p  Orthopedic  Radha Collado,  M16.11  Unilateral



     Services Of  MMARIOLA    primary



     C.M.A.      osteoarthritis,



           right hip









 M17.0  Bilateral primary osteoarthritis of knee

 

 M25.462  Effusion, left knee

 

 M25.562  Pain in left knee

 

 M25.461  Effusion, right knee

 

 M25.561  Pain in right knee

 

 M54.5  Low back pain









 Office Visit  2016  2:20p  [facility]  [place] Carlin,  M25.562  
Pain in left



     Medicine - [place]  M.D.    knee









 M25.561  Pain in right knee

 

 M25.551  Pain in right hip









 Office Visit  2015  9:40a  [facility]  [place]  M85.9  Disorder of 
bone



     Medicine Kyle Carlin M.D.    density and



     [facility]      structure,



           unspecified









 R06.02  Shortness of breath

 

 K21.9  Gastro-esophageal reflux disease without esophagitis

 

 E78.5  Hyperlipidemia, unspecified









 Office Visit  2015 10:20a  [facility]  Donavon Jose G,  R06.02  Shortness 
of



     [facility]    breath









 R07.9  Chest pain, unspecified

 

 R14.2  Eructation









 Office Visit  2015 10:00a  [facility]  Donavon Araiza NP  784.91  
Postnasal Drip



     [facility]      









 466.0  Bronchitis Acute

 

 786.2  Cough









 Office  2015  [facility]  Donavon Araiza NP  466.0  Bronchitis Acute



 Visit  9:00a  Medicine -      



     [facility]      

 

 Office  2015  [facility]  Lisseth Hardy,  788.43  Nocturia



 Visit  1:00p  Medicine -  N.P.    



     [facility]      

 

 Office  2014  [facility]  [place]  272.0  Hypercholesterolemia Pure



 Visit  10:00a  Janet Carlin M.D.    



     [facility]      









 719.44  Pain Joint Hand

 

 424.0  Mitral Valve Disorder









 Office Visit  2014  1:00p  [facility]  [place]  V70.0  Examination



     Janet Carlin M.D.    General Medical



     [facility]      Routine AT Health



           Care Facility









 401.1  Hypertension Benign

 

 272.0  Hypercholesterolemia Pure

 

 437.8  Cerebrovascular Disease Other

 

 424.1  Aortic Valve Disorder









 Office Visit  2014 11:40a  [facility]  Lisseth Hardy,  724.5  Backache 
Unspec



     Medicine -  N.P.    



     [facility]      

 

 Office Visit  10/17/2013  1:00p  [facility]  [place]  401.1  Hypertension



     Janet Carlin M.D.    Benign



     [facility]      









 780.52  Insomnia Unspecified

 

 V04.81  Need For Prophylactic Vaccination & Inoculation/Influenza









 Office Visit  2013  4:20p  [facility]  Yoanna Cohen,  789.04  Pain 
Abdominal



     Medicine -  M.D., FACP    Left Lower



     [facility]      [place]

 

 Office Visit  2013  2:40p  [facility]  [place]  V70.0  Examination



     Medicine Kyle Carlin M.D.    General Medical



     [facility]



           Care [facility]









 272.0  Hypercholesterolemia Pure

 

 401.1  Hypertension Benign

 

 V76.10  Screening For Malignant Neoplasm Breast









 Office Visit  2012  9:30a  [facility]  Nurse Visit A  401.1  
Hypertension



     Medicine -      Benign



     [facility]      

 

 Office Visit  10/16/2012 10:00a  [facility]  [place]  401.1  Hypertension



     Janet Carlin M.D.    Benign



     [facility]      









 782.0  Skin Sensation Disturbance

 

 272.0  Hypercholesterolemia Pure









 Office Visit  2012 10:20a  [facility]  [place]  V70.0  Examination



     Janet Carlin M.D.    General Medical



     [facility]      Routine AT Health



           Care Facility









 272.0  Hypercholesterolemia Pure

 

 724.2  Lumbago

 

 V76.10  Screening For Malignant Neoplasm Breast

 

 V82.81  Special Screening For Osteoporosis

 

 V12.50  History Personal Circulatory Diseases Unspec









 Office Visit  2012 11:20a  [facility]  [place]  729.5  Pain In Limb



     Medicine - [place] Carlin M.D.    

 

 Office Visit  10/04/2011 10:30a  [facility]  Steve GRACE  786.50  Pain 
Chest



       LAURA Alonso    Unspec









 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

 

 401.1  Hypertension Benign









 Office Visit  2011  DO Not Use  [place]  786.05  Shortness Of



   1:20p  Denise Carlin M.D.    Breath









 v04.81  Need For Prophylactic Vaccination & Inoculation/Influenza









 Office Visit  2011  DO Not Use  [place]  401.1  Hypertension



   10:20a  Denise Carlin M.D.    Benign









 272.0  Hypercholesterolemia Pure

 

 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

 

 V76.10  Screening For Malignant Neoplasm Breast









 Office Visit  2011  DO Not Use  Lisseth Hardy,  680.8  [place] &



   11:00a  [place]  N.P.    Furuncle Spec



           Sites Other

 

 Office Visit  2011  DO Not Use  [place]  401.1  Hypertension



   10:30a  Denise Carlin M.D.    Benign

 

 Office Visit  2011  DO Not Use  [place]  789.00  Pain Abdominal



   11:30a  Denise Carlin M.D.    Unspec Site

 

 Office Visit  2010  DO Not Use  [place]  V70.0  Examination



   10:45a  Denise Carlni M.D.    General Medical



           Routine AT Health



           Care Facility









 401.1  Hypertension Benign

 

 272.0  Hypercholesterolemia Pure

 

 569.49  Rectum & Anus Disorder Other









 Office Visit  2010  DO Not Use  [place],  465.9  URI  Upper



   2:45p  Denise Appiah M.D.    Respiratory



           Infections Acute



           Unspec Sites









 786.2  Cough

 

 401.1  Hypertension Benign









 Office Visit  2009  DO Not Use  Lisseth Varzane,  466.0  Bronchitis Acute



   2:45p  [facility]  N.P.    

 

 Office Visit  2009  DO Not Use  [place],  V70.0  Examination



   2:30p  Denise Appiah M.D.    General Medical



           Routine AT Health



           Care Facility









 729.5  Pain In Limb

 

 401.1  Hypertension Benign

 

 272.0  Hypercholesterolemia Pure

 

 443.9  Peripheral Vascular Disease Unspec

 

 V04.81  Need For Prophylactic Vaccination & Inoculation/Influenza









 Office Visit  2009  DO Not Use  Lisseth Varn,  727.43  Ganglion Unspec



   4:15p  [place]  N.P.    

 

 Office Visit  2009  DO Not Use  [place],  443.9  Peripheral



   2:45p  Denise Appiah M.D.    Vascular Disease



           Unspec









 715.94  Osteoarthrosis Unspec Genlzd Or Localized Hand

 

 401.1  Hypertension Benign









 Office Visit  2009  DO Not Use  [place],  V72.84  Examination



   3:15p  Denise Appiah M.D.    Preoperative



           Unspec









 443.9  Peripheral Vascular Disease Unspec

 

 401.1  Hypertension Benign









 Office  2008  DO Not Use  [place],  272.0  Hypercholesterolemia



 Visit  2:45p  Denise Appiah M.D.    Pure









 443.9  Peripheral Vascular Disease Unspec

 

 311  Depressive Disorder Not Elsewhere Spec









 Office Visit  2008  DO Not Use  [place],  443.9  Peripheral



   10:45a  Denise Appiah M.D.    Vascular Disease



           Unspec









 780.79  Malaise And Fatigue Other

 

 401.1  Hypertension Benign

 

 311  Depressive Disorder Not Elsewhere Spec

 

 V70.0  Examination General Medical Routine AT Health Care Facility









 Office Visit  2008  DO Not Use  [place],  272.4  Hyperlipidemia



   2:45p  Denise Appiah M.D.    Other Unspec









 401.1  Hypertension Benign









 Office  2008  DO Not Use  [place],  272.0  Hypercholesterolemia



 Visit  2:30p  Denise Appiah M.D.    Pure









 443.9  Peripheral Vascular Disease Unspec

 

 401.1  Hypertension Benign









 Office Visit  2007  DO Not Use  [place],  401.1  Hypertension



   2:45p  Denise Appiah M.D.    Benign









 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

 

 272.0  Hypercholesterolemia Pure

 

 V70.0  Examination General Medical Routine AT Health Care Facility









 Office Visit  2007  DO Not Use  [place],  401.1  Hypertension



   2:45p  Denise Appiah M.D.    Benign









 528.9  Oral Soft Tissue Diseases Other & Unspec

 

 272.0  Hypercholesterolemia Pure









 Office Visit  2006  DO Not Use  [place],  V72.31  Routine Gyn



   2:15p  Denise Appiah M.D.    Examination







Plan of Treatment

Future Appointment(s):2019  1:15 pm - Radha Collado M.D. at [facility]  3:00 pm - [place] Carlin M.D. at [facility] 
[facility] - Radha Collado M.D.M25.552 Pain in left hipFollow up
:Follow up:   4 [id number] Presence of left artificial hip joint

## 2019-06-23 NOTE — XMS REPORT
Continuity of Care Document (CCD)

 Created on:2019



Patient:Shiela Sheets

Sex:Female

:1930

External Reference #:[id number]





Demographics







 [place] [address]

 

 Home Phone  [phone]

 

 Email Address  [email or web address]

 

 Preferred Language  en

 

 Marital Status  Not  or 

 

 Evangelical Affiliation  Unknown

 

 Race  White

 

 Ethnic Group  Not  or 









Author







 Name  Danika Stevens









Support







 Name  Relationship  Address  Phone

 

 Frank Sheets  Unavailable  Unavailable  [phone]

 

 Love Sheets  Unavailable  Unavailable  [phone]









Care Team Providers







 Name  Role  Phone

 

 Tracy Carlin MD  Primary Care Physician  Unavailable









Payers







 Date  Identification Numbers  Payment Provider  Subscriber

 

 Effective: 2013  Policy Number: [id number]  Medicare Blue Ppo  
Shiela Sheets









 Group Number: [id number]  [address] [address]

 

 PayID:   [place] [address]









 Expires: 2012  Policy Number: [id number]  Medicare Blue Ppo  Shiela Sheets









 Group Number: MEDICARE BLUE PPO  [address] [address]

 

 Group Name: [place] [address]









 PayID: 







Problems







 Active Problems  Provider  Date

 

 Benign essential hypertension  Tracy Carlin M.D.  Onset: 2010

 

 Cerebral artery occlusion  Tracy Carlin M.D.  Onset: 2010

 

 Localized, primary osteoarthritis  Radha Collado M.D.  Onset: 2016

 

 Localized, primary osteoarthritis of the  Radha Collado M.D.  Onset: 2016



 pelvic region and thigh    







Family History







 Date  Family Member(s)  Observation  Comments

 

   General  Heart Disease  

 

   General  Diabetes  

 

   General  Cancer  

 

   General  Alzheimer's Disease  

 

 :  (age 85  Father   due to Cancer,  



 Years)    Lung  

 

 :  (age 93  Mother   due to  



 Years)    Alzheimer's Disease  

 

   First Brother   due to Heart  ()



     Disease  

 

   Second Brother   due to Melanoma  () - CAD

 

   Third Brother   due to  () -  of



     Alzheimer's Disease  pneumonia

 

   First Sister  Breast Cancer  

 

 :  (age 85  Second Sister   due to COPD  



 Years)      

 

   Third Sister  Alive And Well  







Social History







 Type  Date  Description  Comments

 

 Birth Sex    Unknown  

 

 Marital Status    Single  First  killed



       in , remarried



       but  in 

 

 Lives With    Alone  Drives without



       problems. Worked at



       [employer] quit in



       . Volunteers at



       the [employer]

 

 Occupation    Book keeper  

 

 Advance Directive    Health Care Proxy  son: Frank Sheets

 

 Tobacco Use  Start: Unknown  Never Smoked Cigarettes  

 

 ETOH Use    Denies alcohol use  

 

 Tobacco Use  Start: Unknown  Patient has never  



     smoked  

 

 Recreational Drug Use    Denies Drug Use  

 

 Smoking Status  Reviewed: 19  Patient has never  



     smoked  

 

 Exercise Type/Frequency    Exercises sporadically  







Allergies, Adverse Reactions, Alerts







 Active Allergies  Reaction  Severity  Comments  Date

 

 Penicillin  HIVES  Severe    2010

 

 Sulfa  CHEST PAIN  Severe    2010

 

 Keflex  RASH  Severe    2010

 

 Lisinopril  cough      10/16/2012

 

 Metaxalone  dizzy      2014

 

 Latex/Tape        2019







Medications







 Active Medications  SIG  Qnty  Indications  Ordering Provider  Date

 

 Vit B12  3 x wk      Tracy Carlin,  2013



         M.DNavya  

 

 Diltiazem HCL ER  take one capsule  90caps  R06.02  Tracy Carlin,  10/16/
2012



 Coated Beads  by mouth once      M.D.  



             120mg  daily        



 Caps ER 24HR          



           

 

 Crestor  Take One Tablet  90tabs    Tracy Carlin,  2012



        40mg Tablets  By Mouth Once      M.D.  



   Daily        

 

 Aspirin  1 by mouth once      Tracy Carlin,  2010



        81mg Tablets  daily      M.D.  



 DR          

 

 Caltrate 600+D  1 by mouth once      Tracy Carlin,  2010



   daily      M.D.  



 [id number]          



 Tablets          



           

 

 Proctosol HC  Use as directed  28.350gm    Tracy Carlin,  2010



             2.5%  prn      M.D.  



 Cream          



           

 

 Eq Fiber Therapy  2 in in the      Unknown  



   morning        



 500mg Tablets          



           

 

 Tylenol 8 Hour  1 tab every 6      Unknown  



               650mg  hours as needed        



 Tablets ER  for pain        



           

 

 Eylea  prn      Unknown  



      2mg/0.05ML          



 Solution          



           

 

 Eye Vitamins  daily      Unknown  



           



 Capsules          



           

 

 One Daily For Women  1 po qd      Unknown  



           



 Tablets          



           

 

 Travatan  each eye nightly      Unknown  



         0.004%          



 Solution          



           









 History Medications









 Tramadol HCL  1-2 tablets every  42tabs  M54.9  [facility]  2018 -



             50mg  6 hours as needed      LAURA Carlin  2019



 Tablets          



           

 

 Doxycycline Hyclate  one tablet twice  20caps  J01.00  Donavon Araiza NP  10/31/
2016 -



   daily for 10        2016



 100mg Capsules  days.        



           

 

 Fluticasone  2 sprays each  16units  J01.00  Donavon Araiza NP  10/31/2016 -



 Propionate  nostril daily as        2019



           50mcg/Act  needed-not using        



 Suspension          



           

 

 Benzonatate  take one or two  30caps  J01.00  Donavon Araiza NP  10/31/2016 -



            100mg  capsules every 8        2016



 Capsules  hours as needed        



   for cough.        

 

 Levaquin  1 by mouth daily  10tabs  J01.00  Lisseth Hardy,  2016 -



         500mg  for 10 days      N.P.  2016



 Tablets          



           

 

 Fluticasone  2 sprays each  16units  J01.00  Lisseth Hardy,  2016 -



 Propionate  nostril daily as      N.P.  2016



           50mcg/Act  needed        



 Suspension          



           

 

 Azithromycin  two tabs day one,  6tabs  J01.00  Lisseth Hardy,  2016 -



             250mg  one daily till      N.P.  2016



 Tablets  gone        



           

 

 Meloxicam  1 by mouth every  60tabs  M16.11  Radha Collado,  2016 -



          15mg  day      M.DNavya  2017



 Tablets          



           

 

 Lorazepam  1-2 tabs by mouth  5tabs    Tracy  2016 -



          0.5mg  prior to      LAURA Carlin  04/10/2016



 Tablets  procedure        



           

 

 Omeprazole  1 by mouth as  30caps  R14.2  Donavon Araiza NP  2015 -



           20mg  needed        2016



 Capsules DR Andrew  1 by mouth every  30tabs    Lisseth Hardy,  2015 -



         10mg Tablets  day      N.P.  2015



           

 

 Ditropan XL  1 by mouth every  30tabs  788.43  Lisseth Hardy,  2015 -



            10mg  day      N.P.  2015



 Tablets ER 24HR          



           

 

 Metaxalone  take 1 tablet 3  30tabs  724.5  Lisseth Hardy,  2014 -



           800mg  times a day as      N.P.  2014



 Tablets  needed        



           

 

 Tizanidine HCL  One po q 4 hours  30caps    Lisseth Antony,  2014 -



               4mg  prn      N.P.  2014



 Capsules          



           

 

 Zolpidem Tartrate  1/2-1 po tablet  30tabs  780.52  [facility]  10/17/2013 -



                  5mg  at bedtime as      LAURA Carlin  2014



 Tablets  needed        



           

 

 Zostavax  1 dose s/c  1units    [facility]  2012 -



          Solution        LAURA Carlin  10/15/2012



 Rec          

 

 Diltiazem HCL ER  Take One Capsule  30caps  786.05  [facility]  2012 -



   By Mouth Once      LAURA Carlin  10/16/2012



 180mg Caps ER 24HR  Daily        



           

 

 Atorvastatin Calcium  1 po qd  90tabs    [facility]  2012 -



         LAURA Carlin  2012



 40mg Tablets          



           

 

 Diltiazem CD  1 PO qd  30caps  786.05  [facility]  2011 -



             180mg        LAURA Carlin  2012



 Caps ER 24HR          



           

 

 Lisinopril  1 tablet once  30tabs  786.05  Tracy   -



           10mg  daily      LAURA Carlin  2011



 Tablets          



           

 

 Cipro  1 tablet twice a  20tabs    Unknown   -



      500mg Tablets  day        2011



           

 

 Crestor  Take One Tablet  30tabs    [facility]   -



        40mg Tablets  By Mouth AT      LAURA Carlin  2012



   Bedtime        

 

 Aleve  1 po prn  60caps    Unknown   -



      220mg Capsules          10/16/2012



           

 

 Flonase Allergy  2 sprays each      Unknown   -



 Relief  nostril once        2015



       50mcg/Act  daily        



 Suspension          



           







Medications Administered in Office







 Medication  SIG  Qnty  Indications  Ordering Provider  Date

 

 Depomedrol 40MG        Radha Collado M.D.  2019



         Injection          



           

 

 Inj, Regadenoson, 0.1 MG        Amador Diaz DO [facility]  2016



                  Injection          



           

 

 Inj, Regadenoson, 0.1 MG        GLORIA Peralta  2016



                  Injection          



           

 

 Technetium TC 99M        Amador S. Diaz, DO [facility]  2016



 Tetrofosmin, Per Unit Dose          



 Up To 40 Millicuries          



              Injection          



           

 

 Technetium TC 99M        GLORIA Peralta  2016



 Tetrofosmin, Per Unit Dose          



 Up To 40 Millicuries          



              Injection          



           







Immunizations







 CPT Code  Status  Date  Vaccine  Lot #

 

 [id number]  Given  10/02/2018  Influenza Virus Vaccine, Quadrivalent, Split,  



       Preservative Free  

 

 [id number]  Given  2017  Influenza Virus Vaccine, Quadrivalent, Split,  [id number]



       Preservative Free  

 

 90041  Given  10/14/2016  Influ Virus Vaccine, Quadrivalent, Split Virus,  
[id number]



       Im Fluzone not PF  

 

 73986  Given  10/10/2015  Influenza Virus Vaccine, Quadrivalent, Split,  [id number]



       Preservative Free  

 

 77835  Given  2015  Pneumococcal Conjugate Vaccine 13 Valent For  [id number]



       Intramuscular Use  

 

 50412  Given  10/14/2014  Influenza Virus Vaccine, Quadrivalent, Split,  [id number]



       Preservative Free  

 

 [id number]  Given  10/17/2013  Flu Vaccine Split Virus Preservative Free For  
[id number]



       Indiv 3Yr Older  

 

   Given  2012  Fluzone Vaccine  

 

 34540  Given  2012  Zoster (Zostavax)  

 

   Given  2011  Afluria Vaccine  [id number]

 

 40136  Given  2010  Influenza Virus 3Yrs & Over  [id number]

 

 86340  Given  2009  Influenza Virus 3Yrs & Over  

 

 04477  Given  10/22/2008  Influenza Virus 3Yrs & Over  

 

 40071  Given  10/22/2008  Influenza Virus 3Yrs & Over  

 

 48353  Given  10/25/2007  Influenza Virus 3Yrs & Over  







Vital Signs







 Date  Vital  Result  Comment

 

 2019  8:40am  Height  63 inches  5'3"









 Weight  144.00 lb  

 

 Heart Rate  73 /min  

 

 BP Systolic Sitting  133 mmHg  

 

 BP Diastolic Sitting  66 mmHg  

 

 O2 % BldC Oximetry  97 %  

 

 BMI (Body Mass Index)  25.5 kg/m2  









 2019  9:00am  Height  63 inches  5'3"









 Weight  144.00 lb  

 

 Heart Rate  73 /min  

 

 BP Systolic  116 mmHg  

 

 BP Diastolic  57 mmHg  

 

 Body Temperature  97.7 F  

 

 BMI (Body Mass Index)  25.5 kg/m2  









 2019  2:55pm  Height  63 inches  5'3"









 Weight  144.00 lb  with shoes

 

 Heart Rate  70 /min  

 

 BP Systolic  140 mmHg  Rue reg cuff

 

 BP Diastolic  60 mmHg  Rue reg cuff

 

 BP Systolic Sitting  134 mmHg  Lue reg cuff

 

 BP Diastolic Sitting  62 mmHg  Lue reg cuff

 

 BP Systolic Standing  132 mmHg  Lue reg cuff

 

 BP Diastolic Standing  60 mmHg  Lue reg cuff

 

 Respiratory Rate  15 /min  

 

 BMI (Body Mass Index)  25.5 kg/m2  

 

 Ejection Fraction  65-70%  date 19 ECHO









 2019 11:14am  Height  63 inches  5'3"









 Heart Rate  66 /min  

 

 BP Systolic Sitting  150 mmHg  

 

 BP Diastolic Sitting  66 mmHg  

 

 Pain Level  5  

 

 O2 % BldC Oximetry  95 %  









 2019  2:47pm  Height  63 inches  5'3"









 Weight  145.00 lb  

 

 BP Systolic  132 mmHg  

 

 BP Diastolic  60 mmHg  

 

 Body Temperature  98.2 F  

 

 Pain Level  6  

 

 BMI (Body Mass Index)  25.7 kg/m2  









 2019  2:38pm  Height  62.5 inches  5'2.50"









 Weight  142.38 lb  

 

 Heart Rate  76 /min  

 

 BP Systolic  132 mmHg  

 

 BP Diastolic  65 mmHg  

 

 Body Temperature  97.9 F  

 

 O2 % BldC Oximetry  97 %  

 

 BMI (Body Mass Index)  25.6 kg/m2  









 2019  1:40pm  Height  62.5 inches  5'2.50"









 Weight  145.00 lb  

 

 Heart Rate  76 /min  

 

 BP Systolic  130 mmHg  

 

 BP Diastolic  76 mmHg  

 

 BMI (Body Mass Index)  26.1 kg/m2  









 2018  8:52am  Height  63 inches  5'3"









 Weight  143.00 lb  

 

 Heart Rate  80 /min  

 

 BP Systolic  124 mmHg  

 

 BP Diastolic  70 mmHg  

 

 BP Systolic Sitting  147 mmHg  

 

 BP Diastolic Sitting  70 mmHg  

 

 O2 % BldC Oximetry  98 %  

 

 BMI (Body Mass Index)  25.3 kg/m2  









 2018  3:36pm  Height  63 inches  5'3"









 Weight  143.00 lb  

 

 Heart Rate  67 /min  

 

 BP Systolic Sitting  135 mmHg  

 

 BP Diastolic Sitting  67 mmHg  

 

 O2 % BldC Oximetry  96 %  

 

 BMI (Body Mass Index)  25.3 kg/m2  









 2018  9:42am  Height  63 inches  5'3"









 Weight  145.00 lb  

 

 Heart Rate  73 /min  

 

 BP Systolic Sitting  128 mmHg  

 

 BP Diastolic Sitting  65 mmHg  

 

 Body Temperature  98.1 F  

 

 O2 % BldC Oximetry  97 %  

 

 BMI (Body Mass Index)  25.7 kg/m2  









 2018 10:33am  Weight  146.25 lb  









 Heart Rate  81 /min  

 

 BP Systolic Sitting  110 mmHg  

 

 BP Diastolic Sitting  68 mmHg  

 

 Body Temperature  99.7 F  

 

 O2 % BldC Oximetry  95 %  









 2017 10:49am  Height  63 inches  5'3"









 Weight  146.50 lb  

 

 Heart Rate  68 /min  

 

 BP Systolic  140 mmHg  

 

 BP Diastolic  80 mmHg  

 

 Body Temperature  98.1 F  

 

 O2 % BldC Oximetry  97 %  

 

 BMI (Body Mass Index)  25.9 kg/m2  









 2017  9:44am  Weight  145.00 lb  









 Heart Rate  80 /min  

 

 BP Systolic Sitting  140 mmHg  

 

 BP Diastolic Sitting  70 mmHg  

 

 Respiratory Rate  16 /min  

 

 O2 % BldC Oximetry  98 %  









 2016  9:55am  Weight  146.00 lb  









 Heart Rate  70 /min  

 

 BP Systolic  122 mmHg  

 

 BP Diastolic  74 mmHg  

 

 Respiratory Rate  15 /min  

 

 Body Temperature  98.0 F  

 

 O2 % BldC Oximetry  98 %  









 10/31/2016 10:28am  Heart Rate  84 /min  









 BP Systolic Sitting  138 mmHg  

 

 BP Diastolic Sitting  80 mmHg  

 

 Respiratory Rate  14 /min  

 

 Body Temperature  98.1 F  

 

 O2 % BldC Oximetry  98 %  









 2016  4:23pm  Weight  143.00 lb  with shoes









 Heart Rate  68 /min  

 

 BP Systolic Sitting  140 mmHg  

 

 BP Diastolic Sitting  70 mmHg  

 

 Body Temperature  98.2 F  

 

 O2 % BldC Oximetry  98 %  









 2016  2:43pm  Height  62 inches  5'2"









 Weight  146.00 lb  

 

 Heart Rate  76 /min  

 

 BP Systolic Sitting  120 mmHg  

 

 BP Diastolic Sitting  72 mmHg  

 

 Respiratory Rate  15 /min  

 

 Body Temperature  97.8 F  

 

 O2 % BldC Oximetry  97 %  

 

 BMI (Body Mass Index)  26.7 kg/m2  









 2016  4:21pm  Heart Rate  77 /min  









 BP Systolic Sitting  140 mmHg  

 

 BP Diastolic Sitting  70 mmHg  

 

 Body Temperature  98.8 F  

 

 O2 % BldC Oximetry  97 %  









 2016  2:01pm  Height  63 inches  5'3"









 Weight  145.00 lb  

 

 Heart Rate  72 /min  

 

 BP Systolic  137 mmHg  

 

 BP Diastolic  69 mmHg  

 

 BMI (Body Mass Index)  25.7 kg/m2  









 2016  2:25pm  Heart Rate  70 /min  









 BP Systolic Sitting  133 mmHg  

 

 BP Diastolic Sitting  73 mmHg  

 

 Body Temperature  98.4 F  

 

 Pain Level  4  

 

 O2 % BldC Oximetry  97 %  









 2015  9:31am  Height  63 inches  5'3"









 Weight  148.00 lb  

 

 Heart Rate  70 /min  

 

 BP Systolic Sitting  128 mmHg  

 

 BP Diastolic Sitting  82 mmHg  

 

 Respiratory Rate  15 /min  

 

 Body Temperature  98.5 F  

 

 O2 % BldC Oximetry  98 %  

 

 BMI (Body Mass Index)  26.2 kg/m2  









 2015 10:39am  Weight  148.00 lb  









 Heart Rate  66 /min  

 

 BP Systolic Sitting  122 mmHg  

 

 BP Diastolic Sitting  68 mmHg  

 

 Respiratory Rate  14 /min  

 

 Body Temperature  98.8 F  

 

 O2 % BldC Oximetry  98 %  









 10/10/2015 10:03am  Body Temperature  97.1 F  

 

 2015  3:47pm  Weight  145.75 lb  









 Heart Rate  72 /min  

 

 BP Systolic Sitting  154 mmHg  

 

 BP Diastolic Sitting  79 mmHg  









 2015 10:00am  Weight  148.00 lb  









 Heart Rate  71 /min  

 

 BP Systolic Sitting  125 mmHg  

 

 BP Diastolic Sitting  70 mmHg  

 

 Body Temperature  97.7 F  









 2015  8:44am  Weight  145.75 lb  









 Heart Rate  77 /min  

 

 BP Systolic Sitting  134 mmHg  

 

 BP Diastolic Sitting  68 mmHg  

 

 Body Temperature  97.7 F  

 

 O2 % BldC Oximetry  98 %  









 2015 12:56pm  Height  63 inches  5'3"









 Weight  150.00 lb  

 

 Heart Rate  74 /min  

 

 BP Systolic  139 mmHg  

 

 BP Diastolic  64 mmHg  

 

 Body Temperature  98.3 F  

 

 BMI (Body Mass Index)  26.6 kg/m2  









 2014 10:00am  Height  63 inches  5'3"









 Weight  148.50 lb  

 

 Heart Rate  83 /min  

 

 BP Systolic Sitting  120 mmHg  

 

 BP Diastolic Sitting  60 mmHg  

 

 Body Temperature  97.3 F  

 

 O2 % BldC Oximetry  96 %  

 

 BMI (Body Mass Index)  26.3 kg/m2  









 2014 12:56pm  Height  63 inches  5'3"









 Weight  148.00 lb  

 

 Heart Rate  76 /min  

 

 BP Systolic Sitting  118 mmHg  

 

 BP Diastolic Sitting  62 mmHg  

 

 Body Temperature  98.3 F  

 

 BMI (Body Mass Index)  26.2 kg/m2  









 2014 11:40am  Weight  147.50 lb  









 Heart Rate  76 /min  

 

 BP Systolic  144 mmHg  

 

 BP Diastolic  76 mmHg  

 

 Respiratory Rate  16 /min  

 

 Body Temperature  98.3 F  









 10/17/2013 12:59pm  Weight  147.00 lb  









 Heart Rate  72 /min  

 

 BP Systolic  122 mmHg  

 

 BP Diastolic  62 mmHg  









 2013  4:16pm  Height  62 inches  5'2"









 Weight  144.50 lb  

 

 Heart Rate  84 /min  

 

 BP Systolic Sitting  110 mmHg  

 

 BP Diastolic Sitting  60 mmHg  

 

 Body Temperature  98.1 F  

 

 BMI (Body Mass Index)  26.4 kg/m2  









 2013  2:28pm  Height  62.5 inches  5'2.50"









 Weight  146.00 lb  

 

 Heart Rate  72 /min  

 

 BP Systolic Sitting  130 mmHg  

 

 BP Diastolic Sitting  60 mmHg  

 

 BMI (Body Mass Index)  26.3 kg/m2  









 2012  9:37am  Height  63.5 inches  5'3.50"









 Weight  146.00 lb  

 

 Heart Rate  78 /min  

 

 BP Systolic Sitting  126 mmHg  

 

 BP Diastolic Sitting  68 mmHg  

 

 BMI (Body Mass Index)  25.5 kg/m2  









 10/16/2012  9:54am  Height  63.5 inches  5'3.50"









 Weight  146.00 lb  

 

 Heart Rate  72 /min  

 

 BP Systolic Sitting  120 mmHg  

 

 BP Diastolic Sitting  62 mmHg  

 

 BMI (Body Mass Index)  25.5 kg/m2  









 2012 10:27am  Height  63.5 inches  5'3.50"









 Weight  144.50 lb  

 

 Heart Rate  60 /min  

 

 BP Systolic Sitting  130 mmHg  

 

 BP Diastolic Sitting  82 mmHg  

 

 BMI (Body Mass Index)  25.2 kg/m2  









 2012 11:27am  Height  63.5 inches  5'3.50"









 Weight  142.00 lb  

 

 Heart Rate  76 /min  

 

 BP Systolic Sitting  126 mmHg  

 

 BP Diastolic Sitting  62 mmHg  

 

 BMI (Body Mass Index)  24.8 kg/m2  









 2011  1:24pm  Height  63.5 inches  5'3.50"









 Weight  140.00 lb  

 

 Heart Rate  68 /min  

 

 BP Systolic Sitting  134 mmHg  

 

 BP Diastolic Sitting  52 mmHg  

 

 O2 % BldC Oximetry  97 %  

 

 BMI (Body Mass Index)  24.4 kg/m2  









 2011 10:03am  Height  63.5 inches  5'3.50"









 Weight  140.00 lb  

 

 Heart Rate  64 /min  

 

 BP Systolic Sitting  110 mmHg  

 

 BP Diastolic Sitting  76 mmHg  

 

 BMI (Body Mass Index)  24.4 kg/m2  









 2011 11:06am  Height  63.5 inches  5'3.50"









 Weight  139.00 lb  

 

 Heart Rate  68 /min  

 

 BP Systolic Sitting  104 mmHg  

 

 BP Diastolic Sitting  78 mmHg  

 

 BMI (Body Mass Index)  24.2 kg/m2  









 2011 10:33am  Weight  140.00 lb  









 Heart Rate  70 /min  

 

 BP Systolic  130 mmHg  

 

 BP Diastolic  60 mmHg  









 2011 11:35am  Weight  139.00 lb  









 Heart Rate  60 /min  

 

 BP Systolic  126 mmHg  

 

 BP Diastolic  60 mmHg  

 

 Body Temperature  98.7 F  

 

 O2 % BldC Oximetry  98 %  









 2010  7:38pm  Height  62.5 inches  5'2.50"









 Weight  142.75 lb  

 

 Heart Rate  68 /min  

 

 BP Systolic  132 mmHg  

 

 BP Diastolic  70 mmHg  

 

 BMI (Body Mass Index)  25.7 kg/m2  







Results







 Test  Date  Facility  Test  Result  H/L  Range  Note

 

 Urinalysis Profile  2019  [facility]  Urine Color  Yellow    
  



     [address]          



     [place] [address] [phone]          









 Urine Appearance  Cloudy      

 

 Urine Specific Gravity  1.013  N  1.010-1.030  

 

 Urine pH  7.0  N  5-9  

 

 Urine Urobilinogen  Negative    Negative  

 

 Urine Ketones  Negative    Negative  

 

 Urine Protein  Negative    Negative  

 

 Urine Leukocytes  Trace  Abnormal  Negative  

 

 Urine Blood  Negative    Negative  

 

 *  *  Abnormal  Negative  1

 

 Urine Nitrite  Negative    Negative  

 

 Urine Bilirubin  Negative    Negative  

 

 Urine Glucose  Negative    Negative  

 

 Urine White Blood Cell  Trace(0-5/hpf)    Absent  

 

 Urine Red Blood Cell  Trace(0-2/hpf)    Absent  

 

 Urine Bacteria  Absent    Absent  

 

 Urine Squamous Epithelial Cell  Present  Abnormal  Absent  









 CBC Auto Diff  2019  [facility]  White Blood  6.9 10^3/uL  N  
3.5-10.8  



     [address]  Count        



     [place] [address] [phone]          









 Red Blood Count  4.12 10^6/uL  N  3.70-4.87  

 

 Hemoglobin  12.7 g/dL  N  12.0-16.0  

 

 Hematocrit  38 %  N  35-47  

 

 Mean Corpuscular Volume  93 fL  N  80-97  

 

 Mean Corpuscular Hemoglobin  31 pg  N  27-31  

 

 Mean Corpuscular HGB Conc  33 g/dL  N  31-36  

 

 Red Cell Distribution Width  16 %  High  10.5-15  

 

 Platelet Count  245 10^3/uL  N  150-450  

 

 Mean Platelet Volume  8.4 fL  N  7.4-10.4  

 

 Abs Neutrophils  4.2 10^3/uL  N  1.5-7.7  

 

 Abs Lymphocytes  2.1 10^3/uL  N  1.0-4.8  

 

 Abs Monocytes  0.5 10^3/uL  N  0-0.8  

 

 Abs Eosinophils  0.1 10^3/uL  N  0-0.6  

 

 Abs Basophils  0.0 10^3/uL  N  0-0.2  

 

 Abs Nucleated RBC  0.0 10^3/uL      

 

 Granulocyte %  60.4 %      

 

 Lymphocyte %  29.6 %      

 

 Monocyte %  7.6 %      

 

 Eosinophil %  1.9 %      

 

 Basophil %  0.5 %      

 

 Nucleated Red Blood Cells %  0.0      









 Inr/Protime  2019  [facility]  Inr  0.93  N  0.82-1.09  2



     [address]          



     [place] [address] [phone]          

 

 Laboratory test  2019  [facility]  Partial  32.6 seconds  N  
26.0-38.0  



 finding    [address]  Thrombo Time        



     [place] [address]  PTT        



     [phone]          

 

 Type & Screen  2019  [facility]  Patient  A Positive      



     [address]  Blood Type        



     [place], [place] [address] [phone]          









 Antibody Screen  NEGATIVE      









 Urine Culture And  2019  [facility]  Urine Culture  SEE 
RESULT      3



 Sensitivities    [address]    [place]      



     [place] [address] [phone]          

 

 Lipid Profile  2019  [facility]  Triglycerides  132 mg/dL    
  4



 (Trig/Chol/HDL)    [address]          



     [place] [address] [phone]          









 Cholesterol  143 mg/dL      5

 

 HDL Cholesterol  46.8 mg/dL      6

 

 LDL Cholesterol  70 mg/dL      7









 Comp Metabolic Panel  2019  [facility]  Sodium  141 mmol/L  N
  135-145  



     [address]          



     [place] [address] [phone]          









 Potassium  4.1 mmol/L  N  3.5-5.0  

 

 Chloride  105 mmol/L  N  101-111  

 

 Co2 Carbon Dioxide  30 mmol/L  N  22-32  

 

 Anion Gap  6 mmol/L  N  2-11  

 

 Glucose  94 mg/dL  N    

 

 Blood Urea Nitrogen  12 mg/dL  N  6-24  

 

 Creatinine  0.66 mg/dL  N  0.51-0.95  

 

 BUN/Creatinine Ratio  18.2  N  8-20  

 

 Calcium  9.3 mg/dL  N  8.6-10.3  

 

 Total Protein  6.3 g/dL  Low  6.4-8.9  

 

 Albumin  3.9 g/dL  N  3.2-5.2  

 

 Globulin  2.4 g/dL  N  2-4  

 

 Albumin/Globulin Ratio  1.6  N  1-3  

 

 Total Bilirubin  0.50 mg/dL  N  0.2-1.0  

 

 Alkaline Phosphatase  73 U/L  N    

 

 Alt  24 U/L  N  7-52  

 

 Ast  23 U/L  N  13-39  

 

 Egfr Non-  84.5    >60  

 

 Egfr   102.3    >60  8









 Xray  2019  [facility]  Inj/Aspir Major  <pending>      



       JT Or Bursa W/ US        

 

 Comp Metabolic  2018  [facility]  Sodium  139 mmol/L  N  139-
145  



 Panel    [address]          



     [place] [address] [phone]          









 Potassium  4.5 mmol/L  N  3.5-5.0  

 

 Chloride  105 mmol/L  N  101-111  

 

 Co2 Carbon Dioxide  27 mmol/L  N  22-32  

 

 Anion Gap  7 mmol/L  N  2-11  

 

 Glucose  87 mg/dL  N    

 

 Blood Urea Nitrogen  14 mg/dL  N  6-24  

 

 Creatinine  0.64 mg/dL  N  0.51-0.95  

 

 BUN/Creatinine Ratio  21.9  High  8-20  

 

 Calcium  9.6 mg/dL  N  8.6-10.3  

 

 Total Protein  6.2 g/dL  Low  6.4-8.9  

 

 Albumin  3.9 g/dL  N  3.2-5.2  

 

 Globulin  2.3 g/dL  N  2-4  

 

 Albumin/Globulin Ratio  1.7  N  1-3  

 

 Total Bilirubin  0.40 mg/dL  N  0.2-1.0  

 

 Alkaline Phosphatase  56 U/L  N    

 

 Alt  22 U/L  N  7-52  

 

 Ast  21 U/L  N  13-39  

 

 Egfr Non-  87.8    >60  

 

 Egfr   112.9    >60  9









 Lipid Profile  2018  [facility]  Triglycerides  169 mg/dL    
  10



 (Trig/Chol/HDL)    [address]          



     [place] [address] [phone]          









 Cholesterol  140 mg/dL      11

 

 HDL Cholesterol  42.0 mg/dL      12

 

 LDL Cholesterol  64 mg/dL      13









 Laboratory test  2018  [facility]  Erythrocyte Sed  15 mm/Hr  
N  0-40  



 finding    [address]  Rate        



     [place] [address] [phone]          

 

 Protein  2018  [facility]  Total  6.7 g/dL    6.3 -  



 Electrophoresis    [address]  Protein(Pep)      7.9  



     [place] [address] [phone]          









 Albumin  3.2 g/dL  Abnormal  3.4-4.7  

 

 Alpha-1 Globulin  0.2 g/dL    0.1-0.3  

 

 Alpha-2 Globulin  1.2 g/dL  Abnormal  0.6-1.0  

 

 Beta Globulin  1.2 g/dL    0.7-1.2  

 

 Gamma Globulin  0.9 g/dL    0.6-1.6  

 

 Albumin/Globulin Ratio  0.93      

 

 Impression  See Comment      14









 CBC Auto Diff  2018  [facility]  White Blood  8.2 10^3/uL  N  
3.5-10.8  



     [address]  Count        



     [place] [address] [phone]          









 Red Blood Count  4.11 10^6/uL  N  4.0-5.4  

 

 Hemoglobin  12.8 g/dL  N  12.0-16.0  

 

 Hematocrit  39 %  N  35-47  

 

 Mean Corpuscular Volume  95 fL  N  80-97  

 

 Mean Corpuscular Hemoglobin  31 pg  N  27-31  

 

 Mean Corpuscular HGB Conc  33 g/dL  N  31-36  

 

 Red Cell Distribution Width  15 %  N  10.5-15  

 

 Platelet Count  244 10^3/uL  N  150-450  

 

 Mean Platelet Volume  9.2 um3  N  7.4-10.4  

 

 Abs Neutrophils  5.3 10^3/uL  N  1.5-7.7  

 

 Abs Lymphocytes  2.1 10^3/uL  N  1.0-4.8  

 

 Abs Monocytes  0.7 10^3/uL  N  0-0.8  

 

 Abs Eosinophils  0.1 10^3/uL  N  0-0.6  

 

 Abs Basophils  0.1 10^3/uL  N  0-0.2  

 

 Abs Nucleated RBC  0 10^3/uL      

 

 Granulocyte %  64.2 %  N  38-83  

 

 Lymphocyte %  25.5 %  N  25-47  

 

 Monocyte %  8.1 %  High  0-7  

 

 Eosinophil %  1.5 %  N  0-6  

 

 Basophil %  0.7 %  N  0-2  

 

 Nucleated Red Blood Cells %  0      









 Lipid Profile  2017  [facility]  Triglycerides  159 mg/dL  N 
   15



 (Trig/Chol/HDL)    [address]          



     [place] [address] [phone]          









 Cholesterol  140 mg/dL  N    16

 

 HDL Cholesterol  39.3 mg/dL  N    17

 

 LDL Cholesterol  69 mg/dL  N    18









 Comp Metabolic Panel  2017  [facility]  Sodium  138 mmol/L  N
  133-145  



     [address]          



     [place] [address] [phone]          









 Potassium  4.1 mmol/L  N  3.5-5.0  

 

 Chloride  107 mmol/L  N  101-111  

 

 Co2 Carbon Dioxide  29 mmol/L  N  22-32  

 

 Anion Gap  2 mmol/L  N  2-11  

 

 Glucose  92 mg/dL  N    

 

 Blood Urea Nitrogen  15 mg/dL  N  6-24  

 

 Creatinine  0.62 mg/dL  N  0.51-0.95  

 

 BUN/Creatinine Ratio  24.2  High  8-20  

 

 Calcium  9.1 mg/dL  N  8.6-10.3  

 

 Total Protein  6.2 g/dL  Low  6.4-8.9  

 

 Albumin  3.8 g/dL  N  3.2-5.2  

 

 Globulin  2.4 g/dL  N  2-4  

 

 Albumin/Globulin Ratio  1.6  N  1-3  

 

 Total Bilirubin  0.50 mg/dL  N  0.2-1.0  

 

 Alkaline Phosphatase  58 U/L  N    

 

 Alt  22 U/L  N  7-52  

 

 Ast  20 U/L  N  13-39  

 

 Egfr Non-  91.1  N  >60  

 

 Egfr   117.1  N  >60  19









 CBC Auto Diff  2017  [facility]  White Blood  7.1 10^3/uL  N  
3.5-10.8  



     [address]  Count        



     [place] [address] [phone]          









 Red Blood Count  4.19 10^6/uL  N  4.0-5.4  

 

 Hemoglobin  13.1 g/dL  N  12.0-16.0  

 

 Hematocrit  40 %  N  35-47  

 

 Mean Corpuscular Volume  96 fL  N  80-97  

 

 Mean Corpuscular Hemoglobin  31 pg  N  27-31  

 

 Mean Corpuscular HGB Conc  33 g/dL  N  31-36  

 

 Red Cell Distribution Width  15 %  N  10.5-15  

 

 Platelet Count  229 10^3/uL  N  150-450  

 

 Mean Platelet Volume  9 um3  N  7.4-10.4  

 

 Abs Neutrophils  4.3 10^3/uL  N  1.5-7.7  

 

 Abs Lymphocytes  2.0 10^3/uL  N  1.0-4.8  

 

 Abs Monocytes  0.6 10^3/uL  N  0-0.8  

 

 Abs Eosinophils  0.1 10^3/uL  N  0-0.6  

 

 Abs Basophils  0.1 10^3/uL  N  0-0.2  

 

 Abs Nucleated RBC  0 10^3/uL  N    

 

 Granulocyte %  61.0 %  N  38-83  

 

 Lymphocyte %  28.3 %  N  25-47  

 

 Monocyte %  8.1 %  N  1-9  

 

 Eosinophil %  1.9 %  N  0-6  

 

 Basophil %  0.7 %  N  0-2  

 

 Nucleated Red Blood Cells %  0  N    









 Urine Culture And  2017  [facility]  Urine Culture  SEE 
RESULT      20



 Sensitivities    [address]    BELOW      



     [place] [address] [phone]          

 

 Ua Routine  2017  [facility]  Ua Specific  1.005      



       Gravity        









 Ua PH  8      

 

 Ua Color  yellow      

 

 Ua Appera  clear      

 

 Ua WBC  trace      

 

 Ua Protein  neg      

 

 Ua Glucose  neg      

 

 Ua Ketones  neg      

 

 Ua Bilirubin  neg      

 

 Ua Urobilinogen  neg      

 

 Ua Nitrite  neg      

 

 Ua Occult Blood  neg      









 Lipid Profile  12/15/2016  [facility]  Triglycerides  137 mg/dL  N 
   21



 (Trig/Chol/HDL)    [address]          



     [place] [address] [phone]          









 Cholesterol  147 mg/dL  N    22

 

 HDL Cholesterol  48.2 mg/dL  N    23

 

 LDL Cholesterol  71 mg/dL  N    24









 Comp Metabolic Panel  12/15/2016  [facility]  Sodium  140 mmol/L  N
  133-145  



     [address]          



     [place] [address] [phone]          









 Potassium  4.2 mmol/L  N  3.5-5.0  

 

 Chloride  105 mmol/L  N  101-111  

 

 Co2 Carbon Dioxide  29 mmol/L  N  22-32  

 

 Anion Gap  6 mmol/L  N  2-11  

 

 Glucose  92 mg/dL  N    

 

 Blood Urea Nitrogen  15 mg/dL  N  6-24  

 

 Creatinine  0.62 mg/dL  N  0.51-0.95  

 

 BUN/Creatinine Ratio  24.2  High  8-20  

 

 Calcium  9.2 mg/dL  N  8.6-10.3  

 

 Total Protein  6.5 g/dL  N  6.4-8.9  

 

 Albumin  4.0 g/dL  N  3.2-5.2  

 

 Globulin  2.5 g/dL  N  2-4  

 

 Albumin/Globulin Ratio  1.6  N  1-3  

 

 Total Bilirubin  0.50 mg/dL  N  0.2-1.0  

 

 Alkaline Phosphatase  58 U/L  N    

 

 Alt  25 U/L  N  7-52  

 

 Ast  20 U/L  N  13-39  

 

 Egfr Non-  91.3  N  >60  

 

 Egfr   117.4  N  >60  25









 Comp Metabolic Panel  2015  [facility]  Sodium  140 mmol/L  N
  133-145  



     [address]          



     [place] [address] [phone]          









 Potassium  4.1 mmol/L  N  3.5-5.0  

 

 Chloride  105 mmol/L  N  101-111  

 

 Co2 Carbon Dioxide  28 mmol/L  N  22-32  

 

 Anion Gap  7 mmol/L  N  2-11  

 

 Glucose  96 mg/dL  N    

 

 Blood Urea Nitrogen  15 mg/dL  N  6-24  

 

 Creatinine  0.72 mg/dL  N  0.51-0.95  

 

 BUN/Creatinine Ratio  20.8  High  8-20  

 

 Calcium  9.3 mg/dL  N  8.6-10.3  

 

 Total Protein  6.5 g/dL  N  6.4-8.9  

 

 Albumin  4.1 g/dL  N  3.2-5.2  

 

 Globulin  2.4 g/dL  N  2-4  

 

 Albumin/Globulin Ratio  1.7  N  1-3  

 

 Total Bilirubin  0.50 mg/dL  N  0.2-1.0  

 

 Alkaline Phosphatase  58 U/L  N    

 

 Alt  24 U/L  N  7-52  

 

 Ast  22 U/L  N  13-39  

 

 Egfr Non-  77.0  N  >60  

 

 Egfr   99.0  N  >60  26









 Lipid Profile  2015  [facility]  Triglycerides  149 mg/dL  N 
   27



 (Trig/Chol/HDL)    [address]          



     [place] [address] [phone]          









 Cholesterol  157 mg/dL  N    28

 

 HDL Cholesterol  47.7 mg/dL  N    29

 

 LDL Cholesterol  80 mg/dL  N    30









 CKMB  2015  [facility]  CKMB  ng/mL  1.7 ng/mL  N  0.6-6.3  



     [address]          



     [place] [address] [phone]          

 

 Laboratory test  2015  [facility]  Troponin-I  0.00 ng/mL  N  
<0.03  31



 finding    [address]  (TnI)        



     [place] [address] [phone]          

 

 CBC Auto Diff  2015  [facility]  White Blood  6.5 10^3/uL  N  
3.5-10.8  



     [address]  Count        



     [place] [address] [phone]          









 Red Blood Count  4.28 10^6/uL  N  4.0-5.4  

 

 Hemoglobin  13.8 g/dL  N  12.0-16.0  

 

 Hematocrit  42 %  N  35-47  

 

 Mean Corpuscular Volume  98 fL  High  80-97  

 

 Mean Corpuscular Hemoglobin  32 pg  High  27-31  

 

 Mean Corpuscular HGB Conc  33 g/dL  N  31-36  

 

 Red Cell Distribution Width  14 %  N  10.5-15  

 

 Platelet Count  218 10^3/uL  N  150-450  

 

 Mean Platelet Volume  9 um3  N  7.4-10.4  

 

 Abs Neutrophils  4.1 10^3/uL  N  1.5-7.7  

 

 Abs Lymphocytes  1.8 10^3/uL  N  1.0-4.8  

 

 Abs Monocytes  0.5 10^3/uL  N  0-0.8  

 

 Abs Eosinophils  0.1 10^3/uL  N  0-0.6  

 

 Abs Basophils  0 10^3/uL  N  0-0.2  

 

 Abs Nucleated RBC  0 10^3/uL  N    

 

 Granulocyte %  62.2 %  N  38-83  

 

 Lymphocyte %  27.8 %  N  25-47  

 

 Monocyte %  7.8 %  N  1-9  

 

 Eosinophil %  1.6 %  N  0-6  

 

 Basophil %  0.6 %  N  0-2  

 

 Nucleated Red Blood Cells %  0  N    









 Laboratory test  2015  [facility]  B-Type Natriuretic  74 pg/
mL  N    32



 finding    [address]  Peptide BNP        



     [place] [address] [phone]          

 

 Lipid Profile  2014  [facility]  Triglycerides  202 mg/dL  N 
   33, 34



 (Trig/Chol/HDL)    [address]          



     [place] [address] [phone]          









 Cholesterol  152 mg/dL  N    35

 

 HDL Cholesterol  44.5 mg/dL  N    36

 

 LDL Cholesterol  67 mg/dL  N    37









 Comp Metabolic Panel  2014  [facility]  Sodium  139 mmol/L  N
  133-145  



     [address]          



     [place] [address] [phone]          









 Potassium  4.2 mmol/L  N  3.5-5.0  38

 

 Chloride  104 mmol/L  N  101-111  

 

 Co2 Carbon Dioxide  30 mmol/L  N  22-32  

 

 Anion Gap  5 mmol/L  N  2-11  

 

 Glucose  93 mg/dL  N    

 

 Blood Urea Nitrogen  10 mg/dL  N  6-24  

 

 Creatinine  0.66 mg/dL  N  0.51-0.95  

 

 BUN/Creatinine Ratio  15.2  N  8-20  

 

 Calcium  9.3 mg/dL  N  8.6-10.3  

 

 Total Protein  6.3 g/dL  Low  6.4-8.9  

 

 Albumin  3.9 g/dL  N  3.2-5.2  

 

 Globulin  2.4 g/dL  N  2-4  

 

 Albumin/Globulin Ratio  1.6  N  1-3  

 

 Total Bilirubin  0.50 mg/dL  N  0.2-1.0  

 

 Alkaline Phosphatase  63 U/L  N    

 

 Alt  24 U/L  N  7-52  

 

 Ast  22 U/L  N  13-39  

 

 Egfr Non-  85.3  N  >60  

 

 Egfr   109.7  N  >60  39









 Throat-Beta  2014  [facility]  Throat Beta Strep  (SEE      40
, 41



 Strept    [address]  Culture  NOTE)      



     [place] [address] [phone]          

 

 Surgical  2014  [facility]  S  RUN DATE:      42



 Pathology    [address]    /      



     [place] [address]    <SEE      



     [phone]    NOTE>      

 

 Surgical  04/15/2014  [facility]  S  RUN DATE:      43



 Pathology    [address]    04/15/      



     [place] [address]    <SEE      



     [phone]    NOTE>      

 

 Lipid Profile  10/10/2013  [facility]  Triglycerides  155 mg/dL    
40-2  



 (Trig/Chol/HDL)    [address]        00  



     [place] [address] [phone]          









 Cholesterol  149 mg/dL    Less than 200  

 

 HDL Cholesterol  47 mg/dL    40-60  44

 

 Cholesterol/HDL Ratio  3.2 Average    1-4.44  

 

 LDL Cholesterol  71.0    Less Than 100  45









 Comp Metabolic Panel  10/10/2013  [facility]  Sodium  140 mmol/L    
133-145  



     [address]          



     [place] [address] [phone]          









 Potassium  4.1 mmol/L    3.5-5.0  

 

 Chloride  107 mmol/L    101-111  

 

 Co2 Carbon Dioxide  29.0 mmol/L    22-32  

 

 Anion Gap  4.0 mmol/L    2-11  

 

 Glucose  97 mg/dL      

 

 Blood Urea Nitrogen  10 mg/dL    6-24  

 

 Creatinine  0.60 mg/dL    0.50-1.40  

 

 BUN/Creatinine Ratio  16.7    8-20  

 

 Calcium  9.1 mg/dL    8.1-9.9  

 

 Total Protein  6.1 g/dL  Low  6.2-8.1  

 

 Albumin  3.6 g/dL    3.2-5.2  

 

 Globulin  2.5 g/dL    2-4  

 

 Albumin/Globulin Ratio  1.4    1-3  

 

 Total Bilirubin  0.5 mg/dL    0.4-1.5  

 

 Alkaline Phosphatase  63 U/L      

 

 Alt  29 U/L    14-54  

 

 Ast  29 U/L    12-42  

 

 Egfr Non-  95.5    >60  

 

 Egfr   122.8    >60  46









 CBC W/Electronic  2013  [facility]  White Blood  6.9 10^3/uL 
   4.8-10.8  



 Diff    [address]  Count        



     [place] [address] [phone]          









 Red Blood Count  4.12 10^6/uL    4.0-5.4  

 

 Hemoglobin  13.2 g/dL    12.0-16.0  

 

 Hematocrit  39 %    35-47  

 

 Mean Corpuscular Volume  95 fL    80-97  

 

 Mean Corpuscular Hemoglobin  32 pg  High  27-31  

 

 Mean Corpuscular HGB Conc  34 g/dL    31-36  

 

 Red Cell Distribution Width  14 %    10.5-15  

 

 Platelet Count  245 10^3/uL    150-450  

 

 Mean Platelet Volume  9 um3    7.4-10.4  

 

 Abs Neutrophils  4.3 10^3/uL    1.5-7.7  

 

 Abs Lymphocytes  2.0 10^3/uL    1.0-4.8  

 

 Abs Monocytes  0.5 10^3/uL    0-0.8  

 

 Abs Eosinophils  0.2 10^3/uL    0-0.6  

 

 Abs Basophils  0 10^3/uL    0-0.2  

 

 Abs Nucleated RBC  0 10^3/uL      

 

 Granulocyte %  61.6 %    38-83  

 

 Lymphocyte %  28.9 %    25-47  

 

 Monocyte %  6.8 %    1-9  

 

 Eosinophil %  2.2 %    0-6  

 

 Basophil %  0.5 %    0-2  

 

 Nucleated Red Blood Cells %  0      









 CMP Panel  2013  [facility]  Sodium  141 mmol/L    133-145  



     [address]          



     [place] [address] [phone]          









 Potassium  4.0 mmol/L    3.5-5.0  

 

 Chloride  107 mmol/L    101-111  

 

 Co2 Carbon Dioxide  27.0 mmol/L    22-32  

 

 Anion Gap  7.0 mmol/L    2-11  

 

 Glucose  142 mg/dL  High    

 

 Blood Urea Nitrogen  12 mg/dL    6-24  

 

 Creatinine  0.70 mg/dL    0.50-1.40  

 

 BUN/Creatinine Ratio  17.1    8-20  

 

 Calcium  9.6 mg/dL    8.1-9.9  

 

 Total Protein  6.2 g/dL    6.2-8.1  

 

 Albumin  3.6 g/dL    3.2-5.2  

 

 Globulin  2.6 g/dL    2-4  

 

 Albumin/Globulin Ratio  1.4    1-3  

 

 Total Bilirubin  0.7 mg/dL    0.4-1.5  

 

 Alkaline Phosphatase  62 U/L      

 

 Alt  32 U/L    14-54  

 

 Ast  27 U/L    12-42  

 

 Egfr Non-  79.9    >60  

 

 Egfr   102.8    >60  47









 Ua Routine  2013  [facility]  Ua Specific Gravity  1.005      









 Ua PH  5.0      

 

 Ua Color  neg      

 

 Ua Appera  neg      

 

 Ua WBC  neg      

 

 Ua Protein  neg      

 

 Ua Glucose  neg      

 

 Ua Ketones  neg      

 

 Ua Bilirubin  neg      

 

 Ua Urobilinogen  neg      

 

 Ua Nitrite  neg      

 

 Ua Occult Blood  neg      









 Laboratory test finding  10/23/2012  [facility]  Alt  26 U/L    14-
54  48



     [address] [place] [address] [phone]          









 Ast  25 U/L    12-42  49

 

 TSH (Thyroid Stimulating Horm)  2.57 MIU/ML    0.34-5.60  50

 

 Vitamin B12  336 pg/mL    180-914  51









 Lipid Profile  10/23/2012  [facility]  Triglycerides  95 mg/dL    40
-200  



 (Trig/Chol/HDL)    [address]  [place] [address] [phone]          









 Cholesterol  157 mg/dL    Less than 200  52

 

 HDL Cholesterol  57 mg/dL    40-60  53

 

 Cholesterol/HDL Ratio  2.8 AVERAGE    1-4.44  

 

 LDL Cholesterol  81.0 mg/dL    Less Than 100  









 Comp Metabolic Panel  2012  [facility]  Sodium  140 mmol/L    
135-145  



     [address] [place] [address] [phone]          









 Potassium  4.1 mmol/L    3.5-5.0  

 

 Chloride  105 mmol/L    101-111  

 

 Co2 (Carbon Dioxide)  31.0 mmol/L    22-32  

 

 Anion Gap  4.0 mmol/L    2-11  54

 

 Glucose  96 mg/dL      

 

 BUN  16 mg/dL    6-24  

 

 Creatinine  0.8 mg/dL    0.50-1.40  

 

 One Over Creatinine  1.25      

 

 BUN/Creatinine Ratio  20.0    8-20  

 

 Calcium  9.3 mg/dL    8.1-9.9  

 

 Total Protein  6.2 GM/DL    6.2-8.1  

 

 Albumin  3.8 GM/DL    3.2-5.2  

 

 Globulin  2.4 GM/DL    2-4  

 

 Albumin/Globulin Ratio  1.6    1-3  

 

 Bilirubin Total  0.5 mg/dL    0.4-1.5  55

 

 Alkaline Phosphatase  77 U/L      

 

 Alt (SGPT)  28 U/L    14-54  

 

 Ast (Sgot)  27 U/L    12-42  

 

 eGFR Non-  68.8    > 60  

 

 eGFR   88.5    > 60  56









 Lipid Profile  2012  [facility]  Triglyceride  137 mg/dL    40
-200  



 (Trig/Chol/HDL)    [address] [place] [address]          



     [phone]          









 Cholesterol  184 mg/dL    Less Than 200  57

 

 High Density Lipoprotein  50 mg/dL    40-60  58

 

 Cholesterol/HDL Ratio  3.68 AVERAGE    1-4.44  

 

 Low Density Lipoprotein  107 mg/dL  High  Less Than 100  59









 Lipid Profile  2011  [facility]  Triglyceride  92 mg/dL    40-
200  



 (Trig/Chol/HDL)    [address] [place] [address] [phone]          









 Cholesterol  156 mg/dL    Less Than 200  60

 

 High Density Lipoprotein  46 mg/dL    40-60  61

 

 Cholesterol/HDL Ratio  3.39 AVERAGE    1-4.44  

 

 Low Density Lipoprotein  92 mg/dL    Less Than 100  62









 Comp Metabolic Panel  2011  [facility]  Sodium  142 mmol/L    
135-145  



     [address] [place] [address] [phone]          









 Potassium  4.6 mmol/L    3.5-5.0  

 

 Chloride  107 mmol/L    101-111  

 

 Co2 (Carbon Dioxide)  30.0 mmol/L    22-32  

 

 Anion Gap  5.0 mmol/L    2-11  63

 

 Glucose  103 mg/dL  High    

 

 BUN  12 mg/dL    6-24  

 

 Creatinine  0.7 mg/dL    0.50-1.40  

 

 One Over Creatinine  1.42      

 

 BUN/Creatinine Ratio  17.1    8-20  

 

 Calcium  9.4 mg/dL    8.1-9.9  

 

 Total Protein  6.3 GM/DL    6.2-8.1  

 

 Albumin  3.7 GM/DL    3.2-5.2  

 

 Globulin  2.6 GM/DL    2-4  

 

 Albumin/Globulin Ratio  1.4    1-3  

 

 Bilirubin Total  0.5 mg/dL    0.4-1.5  64

 

 Alkaline Phosphatase  61 U/L      

 

 Alt (SGPT)  25 U/L    14-54  

 

 Ast (Sgot)  28 U/L    12-42  

 

 eGFR Non-  80.3    > 60  

 

 eGFR   103.3    > 60  65









 Manual Differential  2011  [facility]  Polysegmented  82 %    
38-83  



     [address]  Neutrophil        



     [place] [address] [phone]          









 Band Neutrophil  4 %    0-8  

 

 Lymphocyte  11 %  Low  25-47  

 

 Monocyte  3 %    0-13  

 

 Absolute Neutrophil Count  9.9      

 

 RBC Morphology  NORMAL      









 CBC With  2011  [facility]  White Blood  11.6 CUMM  High  4.8-
10.8  



 Electronic Diff    [address]  Count        



     [place] [address] [phone]          









 Red Cell Count  3.99 CUMM  Low  4.2-5.4  

 

 Hemoglobin  12.9 g/dL    12.0-16.0  

 

 Hematocrit  38 %    35-47  

 

 Mean Corpuscular Volume  95 um3    79-97  

 

 Mean Corpuscular Hemoglob  32 pg  High  27-31  

 

 Mean Corpuscular HGB Cone  34 g/dL    32-36  

 

 Redcell Distribution WDTH  14 %    10.5-15  

 

 Platelet Count  259 CUMM    150-450  

 

 Mean Platelet Volume  8.4 um3    7.4-10.4  66









 Comp Metabolic Panel  2011  [facility]  Sodium  137 mmol/L    
135-145  



     [address]          



     [place] [address] [phone]          









 Potassium  4.4 mmol/L    3.5-5.0  

 

 Chloride  101 mmol/L    101-111  

 

 Co2 (Carbon Dioxide)  27.0 mmol/L    22-32  

 

 Anion Gap  9.0 mmol/L    2-11  67

 

 Glucose  80 mg/dL      

 

 BUN  10 mg/dL    6-24  

 

 Creatinine  0.80 mg/dL    0.50-1.40  

 

 One Over Creatinine  1.20      

 

 BUN/Creatinine Ratio  12.5    8-20  

 

 Calcium  9.1 mg/dL    8.1-9.9  

 

 Total Protein  6.1 GM/DL  Low  6.2-8.1  

 

 Albumin  3.4 GM/DL    3.2-5.2  

 

 Globulin  2.7 GM/DL    2-4  

 

 Albumin/Globulin Ratio  1.3    1-3  

 

 Bilirubin Total  0.7 mg/dL    0.4-1.5  68

 

 Alkaline Phosphatase  75 U/L      

 

 Alt (SGPT)  24 U/L    14-54  

 

 Ast (Sgot)  25 U/L    12-42  

 

 eGFR Non-  73.4    > 60  

 

 eGFR   88.8    > 60  69









 Urine Culture &  2010  [facility]  Urine Culture  NG      70



 Sensitivi    [address]  Sensitivi        



     [place] [address] [phone]          

 

 Laboratory test  2010  [facility]  Amylase  48 U/L      
71



 finding    [address]          



     [place] [address] [phone]          









 Lipase  18 U/L  Low  22-51  

 

 C Reactive Protein  2.3 mg/dL  High  Less Than 0.5  









 Laboratory test  2010  [facility]  Stool For  NEGATIVE    
Negative  



 finding    [address]  Blood        



     [place] [address] [phone]          

 

 CBC With  2010  [facility]  White Blood  6.6 CUMM    4.8-10.8
  



 Electronic Diff    [address]  Count        



     [place] [address] [phone]          









 Red Cell Count  4.11 CUMM  Low  4.2-5.4  

 

 Hemoglobin  13.4 g/dL    12.0-16.0  

 

 Hematocrit  39 %    35-47  

 

 Mean Corpuscular Volume  95 um3    79-97  

 

 Mean Corpuscular Hemoglob  33 pg  High  27-31  

 

 Mean Corpuscular HGB Cone  34 g/dL    32-36  

 

 Redcell Distribution WDTH  13 %    10.5-15  

 

 Platelet Count  239 CUMM    150-450  

 

 Mean Platelet Volume  7.4 um3    7.4-10.4  

 

 Gran %  70.8 %    38-83  

 

 Lymph %  18.7 %  Low  25-47  

 

 Mononuclear %  9.7 %  High  1-9  

 

 Eosinophil %  0.8 %    0-6  

 

 Basophil %  0 %    0-2  

 

 Abs Lymphs  1.2    1.0-4.8  

 

 Abs Mononuclear  0.6    0-0.8  

 

 Absolute Neutrophil Count  4.7    1.5-7.7  

 

 Abs Eosinophils  0.1    0-0.6  

 

 Abs Basophils  0    0-0.2  72









 Urinalysis W/Microscopic  2010  [facility]  Ua Color  YELLOW 
   Yellow  



     [address]          



     [place] [address] [phone]          









 Appearance-Urine  CLEAR    Clear  

 

 Specific Gravity-Ur  1.027    1.010-1.030  

 

 Esterase-Urine  1+  Abnormal  Negative  

 

 Nitrite  NEGATIVE    Negative  

 

 Urobilinogen-Ur-DIP  NEGATIVE    Negative  

 

 Protein-Urine  TRACE  Abnormal  Negative  

 

 PH-Urine  5.5    5-9  

 

 Blood-Urine  TRACE  Abnormal  Negative  

 

 Ketones-Urine  NEGATIVE    Negative  

 

 Bilirubin-Ur  NEGATIVE    Negative  

 

 Glucose-Urine  NEGATIVE    Negative  

 

 WBC-Urine  5-10  Abnormal  0-5  

 

 RBC-Urine  0-2    0-2  

 

 Mucus Urine  MODERATE    None  

 

 Epith Cells-Ur  FEW    None  









 Comp Metabolic Panel  2010  [facility]  Sodium  137 mmol/L    
135-145  



     [address] [place] [address] [phone]          









 Potassium  4.3 mmol/L    3.5-5.0  

 

 Chloride  106 mmol/L    101-111  

 

 Co2 (Carbon Dioxide)  24.0 mmol/L    22-32  

 

 Anion Gap  7.0 mmol/L    2-11  73

 

 Glucose  110 mg/dL  High    

 

 BUN  18 mg/dL    6-24  

 

 Creatinine  0.70 mg/dL    0.50-1.40  

 

 One Over Creatinine  1.40      

 

 BUN/Creatinine Ratio  25.7  High  8-20  

 

 Calcium  8.8 mg/dL    8.1-9.9  

 

 Total Protein  6.2 GM/DL    6.2-8.1  

 

 Albumin  3.6 GM/DL    3.2-5.2  

 

 Globulin  2.6 GM/DL    2-4  

 

 Albumin/Globulin Ratio  1.4    1-3  

 

 Bilirubin Total  0.7 mg/dL    0.4-1.5  74

 

 Alkaline Phosphatase  62 U/L      

 

 Alt (SGPT)  23 U/L    14-54  

 

 Ast (Sgot)  25 U/L    12-42  

 

 eGFR Non-  85.6    > 60  

 

 eGFR   103.5    > 60  75









 Comp Metabolic Panel  2010  [facility]  Sodium  141 mmol/L    
135-145  



     [address] [place] [address] [phone]          









 Potassium  4.1 mmol/L    3.5-5.0  

 

 Chloride  106 mmol/L    101-111  

 

 Co2 (Carbon Dioxide)  29.0 mmol/L    22-32  

 

 Anion Gap  6.0 mmol/L    2-11  76

 

 Glucose  85 mg/dL      77

 

 BUN  19 mg/dL    6-24  

 

 Creatinine  0.70 mg/dL    0.50-1.40  

 

 One Over Creatinine  1.40      

 

 BUN/Creatinine Ratio  27.1  High  8-20  

 

 Calcium  9.5 mg/dL    8.1-9.9  78

 

 Total Protein  6.4 GM/DL    6.2-8.1  

 

 Albumin  3.9 GM/DL    3.2-5.2  

 

 Globulin  2.5 GM/DL    2-4  

 

 Albumin/Globulin Ratio  1.6    1-3  

 

 Bilirubin Total  0.7 mg/dL    0.4-1.5  79

 

 Alkaline Phosphatase  52 U/L      

 

 Alt (SGPT)  23 U/L    14-54  

 

 Ast (Sgot)  22 U/L    12-42  

 

 eGFR Non-  85.6    > 60  

 

 eGFR   103.5    > 60  80









 Lipid Profile  2010  [facility]  Triglyceride  182 mg/dL    40
-200  



 (Trig/Chol/HDL)    [address]          



     [place] [address] [phone]          









 Cholesterol  156 mg/dL    Less Than 200  81

 

 High Density Lipoprotein  37 mg/dL  Low  40-60  82

 

 Cholesterol/HDL Ratio  4.22 AVERAGE    1-4.44  

 

 Low Density Lipoprotein  83 mg/dL    Less Than 100  83









 1  *Ascorbic acid is present which may interfere with detection



   of blood.

 

 2  Standard intensity warfarin therapeutic range: 2.0-3.0



   High intensity warfarin therapeutic range: 2.5-3.5

 

 3  SEE RESULT BELOW



   -----------------------------------------------------------------------------
---------------



   Name:  SHIELA SHEETS                 : 1930    Attend Dr: Radha Collado MD



   Acct:  [id number]  Unit: [id number]  AGE: 89            Location:  [facility]



   Re19                        SEX: F             Status:    REG REF



   -----------------------------------------------------------------------------
---------------



   



   SPEC: [id number]         ALEAH:       SUBM DR: Radha Collado MD



   REQ:  [id number]              RECD:   [id number]



   STATUS: COMP



   _



   SOURCE: URINE          SPDESC:



   ORDERED:  Urine Culture



   QUERIES:  Urine Source: Clean Catch



   



   -----------------------------------------------------------------------------
---------------



   Procedure                         Result                         Reported   
        Site



   -----------------------------------------------------------------------------
---------------



   Urine Culture  Final                                             19-
1213      ML



   No Growth (<1,000 CFU/mL)



   



   -----------------------------------------------------------------------------
---------------



   * ML - Main Lab



   .



   



   



   



   



   



   



   



   



   



   



   



   



   



   



   



   



   



   



   



   



   



   



   



   



   



   



   ** END OF REPORT **



   



   DEPARTMENT OF PATHOLOGY,  [address] [place] [address]



   Phone # [phone]      Fax #[phone]



   Onel Hernandez M.D. Director     [facility] # [id number]

 

 4  Desirable: <150



   Borderline High: 150-199



   High: 200-499



   Very High: >500

 

 5  Desirable: <200



   Borderline High: 200-239



   High: >239

 

 6  Low: <40



   Desirable: 40-60



   High: >60

 

 7  Desirable: <100



   Near Optimal: 100-129



   Borderline High: 130-159



   High: 160-189



   Very High: >189

 

 8  *******Because ethnic data is not always readily available,



   this report includes an eGFR for both -Americans and



   non- Americans.****



   The National Kidney Disease Education Program (NKDEP) does



   not endorse the use of the MDRD equation for patients that



   are not between the ages of 18 and 70, are pregnant, have



   extremes of body size, muscle mass, or nutritional status,



   or are non- or non-.



   According to the National Kidney Foundation, irrespective of



   diagnosis, the stage of the disease is based on the level of



   kidney function:



   Stage Description                      GFR(mL/min/1.73 m(2))



   1     Kidney damage with normal or decreased GFR       90



   2     Kidney damage with mild decrease in GFR          60-89



   3     Moderate decrease in GFR                         30-59



   4     Severe decrease in GFR                           15-29



   5     Kidney failure                       <15 (or dialysis)

 

 9  *******Because ethnic data is not always readily available,



   this report includes an eGFR for both -Americans and



   non- Americans.****



   The National Kidney Disease Education Program (NKDEP) does



   not endorse the use of the MDRD equation for patients that



   are not between the ages of 18 and 70, are pregnant, have



   extremes of body size, muscle mass, or nutritional status,



   or are non- or non-.



   According to the National Kidney Foundation, irrespective of



   diagnosis, the stage of the disease is based on the level of



   kidney function:



   Stage Description                      GFR(mL/min/1.73 m(2))



   1     Kidney damage with normal or decreased GFR       90



   2     Kidney damage with mild decrease in GFR          60-89



   3     Moderate decrease in GFR                         30-59



   4     Severe decrease in GFR                           15-29



   5     Kidney failure                       <15 (or dialysis)

 

 10  Desirable: <150



   Borderline High: 150-199



   High: 200-499



   Very High: >500

 

 11  Desirable: <200



   Borderline High: 200-239



   High: >239

 

 12  Low: <40



   Desirable: 40-60



   High: >60

 

 13  Desirable: <100



   Near Optimal: 100-129



   Borderline High: 130-159



   High: 160-189



   Very High: >189

 

 14  RESULT: No apparent monoclonal protein on serum electrophoresis.



   Test Performed by:



   [address] [address]

 

 15  Desirable <150



   Borderline high 150-199



   High 200-499



   Very High >500

 

 16  Desirable <200



   Borderline high 200-239



   High >239

 

 17  Low <40



   Desirable: 40-60



   High: >60

 

 18  Desirable: <100 mg/dL



   Near Optimal: 100-129 mg/dL



   Borderline High: 130-159 mg/dL



   High: 160-189 mg/dL



   Very High: >189 mg/dL

 

 19  *******Because ethnic data is not always readily available,



   this report includes an eGFR for both -Americans and



   non- Americans.****



   The National Kidney Disease Education Program (NKDEP) does



   not endorse the use of the MDRD equation for patients that



   are not between the ages of 18 and 70, are pregnant, have



   extremes of body size, muscle mass, or nutritional status,



   or are non- or non-.



   According to the National Kidney Foundation, irrespective of



   diagnosis, the stage of the disease is based on the level of



   kidney function:



   Stage Description                      GFR(mL/min/1.73 m(2))



   1     Kidney damage with normal or decreased GFR       90



   2     Kidney damage with mild decrease in GFR          60-89



   3     Moderate decrease in GFR                         30-59



   4     Severe decrease in GFR                           15-29



   5     Kidney failure                       <15 (or dialysis)

 

 20  SEE RESULT BELOW



   -----------------------------------------------------------------------------
---------------



   Name:  SHIELA SHEETS                 : 1930    Attend Dr: 
Tracy Carlin MD



   Acct:  [id number]  Unit: [id number]  AGE: 87            Location:  [facility]



   Re17                        SEX: F             Status:    REG REF



   -----------------------------------------------------------------------------
---------------



   



   SPEC: [id number]         ALEAH:       SUBM DR: Tracy Carlin MD



   REQ:  [id number]              RECD:   



   STATUS: COMP



   _



   SOURCE: URINE          SPDESC:



   ORDERED:  Urine Culture



   COMMENTS: [id number]



   Urine Source: Random



   



   -----------------------------------------------------------------------------
---------------



   Procedure                         Result                         Reported   
        Site



   -----------------------------------------------------------------------------
---------------



   Urine Culture  Final                                             17-
1605      ML



   No Growth (<1,000 CFU/mL)



   



   -----------------------------------------------------------------------------
---------------



   * ML - MAIN LAB ([facility])



   .



   



   



   



   



   



   



   



   



   



   



   



   



   



   



   



   



   



   



   



   



   



   



   



   



   ** END OF REPORT **



   



   * ML=Testing performed at Main Lab



   DEPARTMENT OF PATHOLOGY,  [address] [place] [address]



   Phone # [phone]      Fax #[phone]



   Onel Hernandez M.D. Director     [facility] # [id number]

 

 21  Desirable <150



   Borderline high 150-199



   High 200-499



   Very High >500

 

 22  Desirable <200



   Borderline high 200-239



   High >239

 

 23  Low <40



   Desirable: 40-60



   High: >60

 

 24  Desirable: <100 mg/dL



   Near Optimal: 100-129 mg/dL



   Borderline High: 130-159 mg/dL



   High: 160-189 mg/dL



   Very High: >189 mg/dL

 

 25  *******Because ethnic data is not always readily available,



   this report includes an eGFR for both -Americans and



   non- Americans.****



   The National Kidney Disease Education Program (NKDEP) does



   not endorse the use of the MDRD equation for patients that



   are not between the ages of 18 and 70, are pregnant, have



   extremes of body size, muscle mass, or nutritional status,



   or are non- or non-.



   According to the National Kidney Foundation, irrespective of



   diagnosis, the stage of the disease is based on the level of



   kidney function:



   Stage Description                      GFR(mL/min/1.73 m(2))



   1     Kidney damage with normal or decreased GFR       90



   2     Kidney damage with mild decrease in GFR          60-89



   3     Moderate decrease in GFR                         30-59



   4     Severe decrease in GFR                           15-29



   5     Kidney failure                       <15 (or dialysis)

 

 26  *******Because ethnic data is not always readily available,



   this report includes an eGFR for both -Americans and



   non- Americans.****



   The National Kidney Disease Education Program (NKDEP) does



   not endorse the use of the MDRD equation for patients that



   are not between the ages of 18 and 70, are pregnant, have



   extremes of body size, muscle mass, or nutritional status,



   or are non- or non-.



   According to the National Kidney Foundation, irrespective of



   diagnosis, the stage of the disease is based on the level of



   kidney function:



   Stage Description                      GFR(mL/min/1.73 m(2))



   1     Kidney damage with normal or decreased GFR       90



   2     Kidney damage with mild decrease in GFR          60-89



   3     Moderate decrease in GFR                         30-59



   4     Severe decrease in GFR                           15-29



   5     Kidney failure                       <15 (or dialysis)

 

 27  Desirable <150



   Borderline high 150-199



   High 200-499



   Very High >500

 

 28  Desirable <200



   Borderline high 200-239



   High >239

 

 29  Low <40



   Desirable: 40-60



   High: >60

 

 30  Desirable: <100 mg/dL



   Near Optimal: 100-129 mg/dL



   Borderline High: 130-159 mg/dL



   High: 160-189 mg/dL



   Very High: >189 mg/dL

 

 31  Reference Range and Interpretation:



   TnI (ng/mL)             Interpretation



   Less Than 0.03 ng/mL    Not supportive of diagnosis of MI



   0.03 - 0.50 ng/mL       Indeterminate: suggest serial



   studies if clinically indicated.



   Greater than 0.5 ng/mL  Consistent with diagnosis of MI

 

 32  >100 to <200 pg/mL: likely compensated congestive heart



   failure (CHF)



   200 to 400 pg/mL: likely moderate CHF



   >400 pg/mL: likely moderate to severe CHF

 

 33  PT IS FASTING

 

 34  Desirable <150



   Borderline high 150-199



   High 200-499



   Very High >500

 

 35  Desirable <200



   Borderline high 200-239



   High >239

 

 36  Low <40



   Desirable: 40-60



   High: >60

 

 37  Desirable <100



   Near Optimal 100-129



   Borderline high 130-159



   High 160-189



   Very High >189

 

 38  **Potassium reference range changed effective 14**

 

 39  *******Because ethnic data is not always readily available,



   this report includes an eGFR for both -Americans and



   non- Americans.****



   The National Kidney Disease Education Program (NKDEP) does



   not endorse the use of the MDRD equation for patients that



   are not between the ages of 18 and 70, are pregnant, have



   extremes of body size, muscle mass, or nutritional status,



   or are non- or non-.



   According to the National Kidney Foundation, irrespective of



   diagnosis, the stage of the disease is based on the level of



   kidney function:



   Stage Description                      GFR(mL/min/1.73 m(2))



   1     Kidney damage with normal or decreased GFR       90



   2     Kidney damage with mild decrease in GFR          60-89



   3     Moderate decrease in GFR                         30-59



   4     Severe decrease in GFR                           15-29



   5     Kidney failure                       <15 (or dialysis)

 

 40  Comment: collected by micheline romano

 

 41  RUN DATE: 05/15/14               [facility] **LIVE**       
         PAGE    1



   RUN TIME: 09             [address] [place] [address]   [address]



   Specimen Inquiry



   



   -----------------------------------------------------------------------------
---------------



   Name:  SHIELA SHEETS               : 1930    Attend Dr: Galdino Heredia MD



   Acct:  [id number]  Unit: [id number]  AGE: 83            Location:  [facility]



   Re14                        SEX: F             Status:    DEP ER



   -----------------------------------------------------------------------------
---------------



   



   SPEC: [id number]         ALEAH:       [facility] DR: Galdino Heredia MD



   REQ:  [id number]              RECD:   



   STATUS: COMP             ANTONIHR DR: Tracy Carlin MD



   _



   SOURCE: THROAT         SPDESC:



   ORDERED:  Throat Beta Str



   COMMENTS: Comment: collected by micheline romano



   



   -----------------------------------------------------------------------------
---------------



   Procedure                         Result                         Verified   
        Site



   -----------------------------------------------------------------------------
---------------



   Throat Beta Strep Culture  Final                                 05/15/14-
0900      ML



   Negative For Group A Beta Streptococcus



   



   -----------------------------------------------------------------------------
---------------



   



   



   



   



   



   



   



   



   



   



   



   



   



   



   



   



   



   



   



   



   



   



   



   



   



   



   



   



   ** END OF REPORT **



   



   * ML=Testing performed at Main Lab



   DEPARTMENT OF PATHOLOGY,  [address] [place] [address]



   Phone # [phone]      Fax #[phone]



   Onel Hernandez M.D. Director     [facility] # [id number]

 

 42  RUN DATE: 14               [facility] **LIVE**       
         PAGE    1



   RUN TIME: 1350             [address] [place] [address]   [address]



   Specimen Inquiry



   



   -----------------------------------------------------------------------------
---------------



   Name:  SHIELA SHEETS               : 1930    Attend Dr: Mario Bergman MD



   Acct:  [id number]  Unit: [id number]  AGE: 83            Location:  OR



   Re14                        SEX: F             Status:    REG SDC



   -----------------------------------------------------------------------------
---------------



   



   SPEC: [id number]             ALEAH: 14-          SUBM DR: Mario Bergman MD



   REQ:  [id number]             RECD: 



   STATUS: DEBI SPARKS DR: Tracy Carlin MD



   _



   ORDERED:  CONSULT W/ FS, FS ADDITIONAL, LEVEL IV



   



   FINAL DIAGNOSIS



   



   Skin, right upper cutaneous lip, biopsy:



   A. Basal cell carcinoma, nodular type.



   B. Deep, tip, and lateral margins of resection are clear.



   



   



   



   



   



   



   PATHOLOGY SURGICAL CONSULT



   



   Frozen section (FS)/Touch Prep (TP)/Gross Consult (GC)



   



   FS: Right upper cutaneous lip:



   A. Basal cell carcinoma (DS).



   B. Margins clear (DS).



   



   



   PRE-OPERATIVE DIAGNOSIS



   



   Basal cell carcinoma right upper cutaneous lip, suture marks twelve o'clock 
superior margin.



   



   GROSS DESCRIPTION



   



   The specimen is received fresh labeled Shiela Sheets, Excision BCC 
Right Upper



   Cutaneous Lip Suture Marks Twelve O'clock Superior Margin and consists of a 
2.1 x 0.8 cm.



   tan-pink skin ellipse excised to a depth of 0.5 cm.  There is a suture 
attached to one long



   axis which designates Twelve o'clock.  The specimen is inked as follows: 
nine o'clock half -



   black, three o'clock half - blue, and twelve o'clock tip - green, serially 
sectioned from



   twelve o'clock to six o'clock and entirely submitted for frozen section 
microscopy in



   cassettes FSA and FSB.  The frozen section residue is entirely submitted in 
cassettes FSA



   and FSB to include ellipse ends in cassette FSA.



   



   Signed __________(signature on file)___________ Onel Hernandez MD  [id number]



   -----------------------------------------------------------------------------
---------------



   



   ** END OF REPORT **



   



   * ML=Testing performed at Main Lab



   DEPARTMENT OF PATHOLOGY,  [facility] [employer] [place] [address]



   Phone # [phone]      Fax #[phone]



   Onel Hernandez M.D. Director     [place] [employer]  #[id number]

 

 43  RUN DATE: 04/15/14               [facility] **LIVE**       
         PAGE    1



   RUN TIME: 7967             [facility] [place], [place]   [address]



   Specimen Inquiry



   



   -----------------------------------------------------------------------------
---------------



   Name:  SHIELA SHEETS               : 1930    Attend Dr: Mario Bergman MD



   Acct:  [id number]  Unit: [id number]  AGE: 83            Location:  [facility]



   Re/15/14                        SEX: F             Status:    REG REF



   -----------------------------------------------------------------------------
---------------



   



   SPEC: [id number]             ALEAH: 04/15/14-          [facility] DR: Mario Bergman MD



   REQ:  [id number]             RECD: 04/15/



   STATUS: DEBI SPARKS DR: Tracy Ling MD



   _



   ORDERED:  Consult w/slide



   



   FINAL DIAGNOSIS



   



   Skin, right nasolabial fold, shave biopsy:



   A. Basal cell carcinoma, nodular type.



   B. Basal cell carcinoma is broadly transected along the base of this shave 
biopsy.



   



   



   



   



   



   



   PRE-OPERATIVE DIAGNOSIS



   



   Basal cell carcinoma.



   



   GROSS DESCRIPTION



   



   Received is one glass slide labeled [id number], Shiela Sheets [facility] [facility] .
  Accompanying



   this slide is a pathology report labeled Tracy Sheets, [id number] [employer] [facility],



   [address] [place] [address], [facility] [address] [address] signed by Dr. Stuart Doss on



   2014.



   



   Signed __________(signature on file)___________ Onel Hernandez MD 04/15/
14 1518



   



   -----------------------------------------------------------------------------
---------------



   



   



   



   



   



   



   



   



   



   



   



   ** END OF REPORT **



   



   * ML=Testing performed at Main Lab



   DEPARTMENT OF PATHOLOGY,  [address] [place] [address]



   Phone # [phone]      Fax #[phone]



   Onel Hernandez M.D. Director     [place] [employer]  #[id number]

 

 44  HDL Interpretation:



   Undesirable: High Risk:  Less than 40 mg/dL



   Desirable:  Low Risk:  Greater than 60 mg/dL

 

 45  LDL Interpretation:



   Low Risk Optimal Level:  LDL Less than 100 mg/dL



   Near or Above Optimal:  -129 mg/dL



   Borderline High Risk:  -159 mg/dL



   High Risk:  -189 mg/dL



   Very High Risk:  LDL Greater than 189 mg/dL

 

 46  *******Because ethnic data is not always readily available,



   this report includes an eGFR for both -Americans and



   non- Americans.****



   The National Kidney Disease Education Program (NKDEP) does



   not endorse the use of the MDRD equation for patients that



   are not between the ages of 18 and 70, are pregnant, have



   extremes of body size, muscle mass, or nutritional status,



   or are non- or non-.



   According to the National Kidney Foundation, irrespective of



   diagnosis, the stage of the disease is based on the level of



   kidney function:



   Stage Description                      GFR(mL/min/1.73 m(2))



   1     Kidney damage with normal or decreased GFR       90



   2     Kidney damage with mild decrease in GFR          60-89



   3     Moderate decrease in GFR                         30-59



   4     Severe decrease in GFR                           15-29



   5     Kidney failure                       <15 (or dialysis)

 

 47  *******Because ethnic data is not always readily available,



   this report includes an eGFR for both -Americans and



   non- Americans.****



   The National Kidney Disease Education Program (NKDEP) does



   not endorse the use of the MDRD equation for patients that



   are not between the ages of 18 and 70, are pregnant, have



   extremes of body size, muscle mass, or nutritional status,



   or are non- or non-.



   According to the National Kidney Foundation, irrespective of



   diagnosis, the stage of the disease is based on the level of



   kidney function:



   Stage Description                      GFR(mL/min/1.73 m(2))



   1     Kidney damage with normal or decreased GFR       90



   2     Kidney damage with mild decrease in GFR          60-89



   3     Moderate decrease in GFR                         30-59



   4     Severe decrease in GFR                           15-29



   5     Kidney failure                       <15 (or dialysis)

 

 48  PT IS FASTING

 

 49  PT IS FASTING

 

 50  PT IS FASTING

 

 51  PT IS FASTING

 

 52  Desirable:  Less than 200 MG/DL



   Borderline-High Risk:  200-239 MG/DL



   High-Risk:  240 MG/DL and over

 

 53  HDL Interpretation:



   Undesirable: High Risk:  Less than 40 MG/DL



   Desirable:  Low Risk:  Greater than 60 MG/DL

 

 54  Anion gap measurement may be of limited value in the



   presence of any alkalosis, especially in a combined acid



   base disorder.



   .

 

 55  A metabolite of Naproxen, O-desmethylnaproxen, has been



   shown to interfere with the Jendrassik-Anali method for



   measuring total bilirubin.  Samples from patients who have



   taken Naproxen have shown spurious elevation in total



   bilirubin levels.

 

 56  *******Because ethnic data is not always readily available,



   this report includes an eGFR for both -Americans and



   non- Americans.****



   The National Kidney Disease Education Program (NKDEP) does



   not endorse the use of the MDRD equation for patients that



   are not between the ages of 18 and 70, are pregnant, have



   extremes of body size, muscle mass, or nutritional status,



   or are non- or non-.



   According to the National Kidney Foundation, irrespective of



   diagnosis, the stage of the disease is based on the level of



   kidney function:



   Stage Description                      GFR(mL/min/1.73 m(2))



   1     Kidney damage with normal or decreased GFR       90



   2     Kidney damage with mild decrease in GFR          60-89



   3     Moderate decrease in GFR                         30-59



   4     Severe decrease in GFR                           15-29



   5     Kidney failure                       <15 (or dialysis)

 

 57  CHOLESTEROL INTERPRETATION:



   Desirable:  Less than 200 MG/DL



   Borderline-High Risk:  200-239 MG/DL



   High-Risk:  240 MG/DL and over

 

 58  HDL INTERPRETATION:



   Undesirable: High Risk:  Less than 40 MG/DL



   Desirable:  Low Risk:  Greater than 60 MG/DL

 

 59  LDL INTERPRETATION:



   Low Risk Optimal Level:  LDL Less than 100 MG/DL



   Near or Above Optimal:  -129 MG/DL



   Borderline High Risk:  -159 MG/DL



   High Risk:  -189 MG/DL



   Very High Risk:  LDL Greater than 189 MG/DL

 

 60  CHOLESTEROL INTERPRETATION:



   Desirable:  Less than 200 MG/DL



   Borderline-High Risk:  200-239 MG/DL



   High-Risk:  240 MG/DL and over

 

 61  HDL INTERPRETATION:



   Undesirable: High Risk:  Less than 40 MG/DL



   Desirable:  Low Risk:  Greater than 60 MG/DL

 

 62  LDL INTERPRETATION:



   Low Risk Optimal Level:  LDL Less than 100 MG/DL



   Near or Above Optimal:  -129 MG/DL



   Borderline High Risk:  -159 MG/DL



   High Risk:  -189 MG/DL



   Very High Risk:  LDL Greater than 189 MG/DL

 

 63  Anion gap measurement may be of limited value in the



   presence of any alkalosis, especially in a combined acid



   base disorder.



   .

 

 64  A metabolite of Naproxen, O-desmethylnaproxen, has been



   shown to interfere with the Jendrassik-Selinsgrove method for



   measuring total bilirubin.  Samples from patients who have



   taken Naproxen have shown spurious elevation in total



   bilirubin levels.

 

 65  *******Because ethnic data is not always readily available,



   this report includes an eGFR for both -Americans and



   non- Americans.****



   The National Kidney Disease Education Program (NKDEP) does



   not endorse the use of the MDRD equation for patients that



   are not between the ages of 18 and 70, are pregnant, have



   extremes of body size, muscle mass, or nutritional status,



   or are non- or non-.



   According to the National Kidney Foundation, irrespective of



   diagnosis, the stage of the disease is based on the level of



   kidney function:



   Stage Description                      GFR(mL/min/1.73 m(2))



   1     Kidney damage with normal or decreased GFR       90



   2     Kidney damage with mild decrease in GFR          60-89



   3     Moderate decrease in GFR                         30-59



   4     Severe decrease in GFR                           15-29



   5     Kidney failure                       <15 (or dialysis)

 

 66  Lymphopenia %

 

 67  Anion gap measurement may be of limited value in the



   presence of any alkalosis, especially in a combined acid



   base disorder.



   .

 

 68  A metabolite of Naproxen, O-desmethylnaproxen, has been



   shown to interfere with the Jendrassik-Anali method for



   measuring total bilirubin.  Samples from patients who have



   taken Naproxen have shown spurious elevation in total



   bilirubin levels.

 

 69  *******Because ethnic data is not always readily available,



   this report includes an eGFR for both -Americans and



   non- Americans.****



   The National Kidney Disease Education Program (NKDEP) does



   not endorse the use of the MDRD equation for patients that



   are not between the ages of 18 and 70, are pregnant, have



   extremes of body size, muscle mass, or nutritional status,



   or are non- or non-.



   According to the National Kidney Foundation, irrespective of



   diagnosis, the stage of the disease is based on the level of



   kidney function:



   Stage Description                      GFR(mL/min/1.73 m(2))



   1     Kidney damage with normal or decreased GFR       90



   2     Kidney damage with mild decrease in GFR          60-89



   3     Moderate decrease in GFR                         30-59



   4     Severe decrease in GFR                           15-29



   5     Kidney failure                       <15 (or dialysis)

 

 70  FINAL: NO GROWTH DAY 2 (<1,000 CFU/mL)

 

 71  PLEASE NOTE NEW REFERENCE RANGE.

 

 72  Lymphopenia %

 

 73  Anion gap measurement may be of limited value in the



   presence of any alkalosis, especially in a combined acid



   base disorder.



   .

 

 74  A metabolite of Naproxen, O-desmethylnaproxen, has been



   shown to interfere with the Jendrassik-Anali method for



   measuring total bilirubin.  Samples from patients who have



   taken Naproxen have shown spurious elevation in total



   bilirubin levels.

 

 75  *******Because ethnic data is not always readily available,



   this report includes an eGFR for both -Americans and



   non- Americans.****



   The National Kidney Disease Education Program (NKDEP) does



   not endorse the use of the MDRD equation for patients that



   are not between the ages of 18 and 70, are pregnant, have



   extremes of body size, muscle mass, or nutritional status,



   or are non- or non-.



   According to the National Kidney Foundation, irrespective of



   diagnosis, the stage of the disease is based on the level of



   kidney function:



   Stage Description                      GFR(mL/min/1.73 m(2))



   1     Kidney damage with normal or decreased GFR       90



   2     Kidney damage with mild decrease in GFR          60-89



   3     Moderate decrease in GFR                         30-59



   4     Severe decrease in GFR                           15-29



   5     Kidney failure                       <15 (or dialysis)

 

 76  Anion gap measurement may be of limited value in the



   presence of any alkalosis, especially in a combined acid



   base disorder.



   .

 

 77  ** Note change in reference range as of 08.  The



   change was based on recommendations from the American



   Diabetes Association.

 

 78  Please note change in reference range effective 08



   .

 

 79  A metabolite of Naproxen, O-desmethylnaproxen, has been



   shown to interfere with the Jendrassik-Selinsgrove method for



   measuring total bilirubin.  Samples from patients who have



   taken Naproxen have shown spurious elevation in total



   bilirubin levels.

 

 80  *******Because ethnic data is not always readily available,



   this report includes an eGFR for both -Americans and



   non- Americans.****



   The National Kidney Disease Education Program (NKDEP) does



   not endorse the use of the MDRD equation for patients that



   are not between the ages of 18 and 70, are pregnant, have



   extremes of body size, muscle mass, or nutritional status,



   or are non- or non-.



   According to the National Kidney Foundation, irrespective of



   diagnosis, the stage of the disease is based on the level of



   kidney function:



   Stage Description                      GFR(mL/min/1.73 m(2))



   1     Kidney damage with normal or decreased GFR       90



   2     Kidney damage with mild decrease in GFR          60-89



   3     Moderate decrease in GFR                         30-59



   4     Severe decrease in GFR                           15-29



   5     Kidney failure                       <15 (or dialysis)

 

 81  CHOLESTEROL INTERPRETATION:



   Desirable:  Less than 200 MG/DL



   Borderline-High Risk:  200-239 MG/DL



   High-Risk:  240 MG/DL and over

 

 82  HDL INTERPRETATION:



   Undesirable: High Risk:  Less than 40 MG/DL



   Desirable:  Low Risk:  Greater than 60 MG/DL

 

 83  LDL INTERPRETATION:



   Low Risk Optimal Level:  LDL Less than 100 MG/DL



   Near or Above Optimal:  -129 MG/DL



   Borderline High Risk:  -159 MG/DL



   High Risk:  -189 MG/DL



   Very High Risk:  LDL Greater than 189 MG/DL







Procedures







 Date  Code  Description  Status

 

 2019  364920327  Diabetic Retinal Eye Exam  Completed

 

 2019  85655  Destruction Of Benign Lesions Any Method 1-14 lesions  
Completed

 

 2019  22942  EKG Tracing & Interpretation  Completed

 

 2019  15203  ECHO Transthoracic, Real-Time 2D With Doppler And  Completed



     Color Flow  

 

 2019  37150  ECHO Transthoracic, Real-Time 2D With Doppler And  Completed



     Color Flow  

 

 2019  66725  Inj/Aspir Major JT Or Bursa W/ US  Completed

 

 2018  [id number]  Diabetic Retinal Eye Exam  Completed

 

 04/10/2018  13798  ECHO Transthoracic, Real-Time 2D With Doppler And  Completed



     Color Flow  

 

 04/10/2018  58161  ECHO Transthoracic, Real-Time 2D With Doppler And  Completed



     Color Flow  

 

 2017  56026  Destruction Of Benign Lesions Any Method 1-14 lesions  
Completed

 

 2017  08834  Destruction Of Benign Lesions Any Method 1-14 lesions  
Completed

 

 2017  [id number]  Bone Mineral Density Test  Completed

 

 2017  [id number]  Diabetic Retinal Eye Exam  Completed

 

 2016  43433  Stress Test  Completed

 

 2016  26266  Myocardial Perfusion Imaging Tomographic (Spect)  Completed



     Multiple Studies  

 

 2016  64645  Myocardial Perfusion Imaging Tomographic (Spect)  Completed



     Multiple Studies  

 

 2015  85713  EKG Tracing & Interpretation  Completed

 

 2015  [id number]  Mammogram  Completed

 

 2015  788751901  Bone Mineral Density Test  Completed

 

 2014  77267  ECHO Transthoracic, Real-Time 2D With Doppler And  Completed



     Color Flow  

 

 10/24/2013  [id number]  Mammogram  Completed

 

 10/23/2012  [id number]  Bone Mineral Density Test  Completed

 

 10/23/2012  [id number]  Mammogram  Completed

 

 10/18/2011  [id number]  Mammogram  Completed

 

 10/04/2011  64265  Stress Test Supervsn W/Out I/R  Completed

 

 10/04/2011  40115  Treadmill Interp/Report Only  Completed

 

 2011  59535  EKG Tracing & Interpretation  Completed

 

 2011  58791  Noninvasive Ear Or Pulse Oximetry For Oxygen  Completed



     Saturation  

 

 2011  98742  ECHO Transthoracic, Real-Time 2D With Doppler And  Completed



     Color Flow  

 

 2010  [id number]  Mammogram  Completed

 

 2009  [id number]  Bone Mineral Density Test  Completed

 

 2009  01009  EKG Tracing & Interpretation  Completed

 

 2008  74494  EKG Tracing & Interpretation  Completed

 

 2008  94596  EKG Tracing & Interpretation  Completed

 

 2001  [id number]  Colonoscopy  Completed







Encounters







 Type  Date  Location  Provider  Dx  Diagnosis

 

 Office Visit  2019  [facility]  Tracy  Z01.818  Encounter for other



   8:40a  Medicine - Osman Carlin M.D.    preprocedural



           examination









 I10  Essential (primary) hypertension

 

 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

 

 L98.9  Disorder of the skin and subcutaneous tissue, unspecified









 Office Visit  2019  Tawana CASTILLO  Z01.810  Encounter for



   3:00p  Cardiology Of  DO Joe    preprocedural



     [facility]  FAC    cardiovascular



           examination









 I35.0  Nonrheumatic aortic (valve) stenosis

 

 I10  Essential (primary) hypertension

 

 I63.9  Cerebral infarction, unspecified

 

 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









 Office Visit  2019 10:30a  [facility]  Radha Collado,  M25.552  
Pain in left



     Of C.M.A.  M.D.    hip









 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









 Office Visit  2019  DoNotUse [facility]  Tracy  I10  Essential



   2:40p  Monalisa Carlin M.D.    (primary)



           hypertension









 M25.552  Pain in left hip

 

 I35.0  Nonrheumatic aortic (valve) stenosis









 Office Visit  2019  1:30p  [facility]  Radha Tobias,  M25.552  
Pain in left



     Of C.M.A.  M.D.    hip









 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









 Office Visit  2018  9:00a  [facility]  Tracybeverly Carlin,  M25.552  
Pain in left



     Medicine - Osman  M.DNavya    hip









 I10  Essential (primary) hypertension









 Office Visit  2018  3:00p  [facility]  [place]acy  Z00.00  Encntr for



     Janet Carlin M.D.    general adult



           medical exam



           w/o abnormal



           findings









 M54.9  Dorsalgia, unspecified









 Office Visit  07/10/2018 10:20a  [facility]  Mike Vicente MD  L72.0  
Epidermal cyst









 L82.1  Other seborrheic keratosis

 

 Z08  Encntr for follow-up exam after trtmt for malignant neoplasm

 

 Z85.828  Personal history of other malignant neoplasm of skin









 Office Visit  2018  9:40a  [facility]  [place]  M54.9  Dorsalgia,



     Janet Carlin M.D.    unspecified



     [facility]      









 M25.552  Pain in left hip

 

 I10  Essential (primary) hypertension

 

 E78.5  Hyperlipidemia, unspecified









 Office Visit  2018 10:20a  [facility]  [place]acy  I10  Essential (
primary)



     [facility] M.D.    hypertension



     [facility]      









 J06.9  Acute upper respiratory infection, unspecified

 

 I34.0  Nonrheumatic mitral (valve) insufficiency









 Office Visit  2017 10:30a  [facility]  Mike Vicente  L82.1  Other 
seborrheic



       MD    keratosis









 I78.8  Other diseases of capillaries

 

 R60.0  Localized edema

 

 B07.0  Plantar wart

 

 Z78.9  Other specified health status

 

 R20.8  Other disturbances of skin sensation

 

 S90.921A  Unspecified superficial injury of right foot, init encntr









 Office Visit  2017 10:00a  [facility]  [place]  I10  Essential (
primary)



     Medicine Kyle Carlin M.D.    hypertension



     [facility]      









 K62.5  Hemorrhage of anus and rectum

 

 R35.0  Frequency of micturition

 

 K64.4  Residual hemorrhoidal skin tags









 Office Visit  2016 10:00a  [facility]  [place]  I10  Essential (
primary)



     Medicine Kyle Carlin M.D.    hypertension



     [facility]      









 D23.9  Other benign neoplasm of skin, unspecified

 

 G47.00  Insomnia, unspecified









 Office Visit  10/31/2016 10:40a  [facility]  Donavon Araiza NP  J01.00  Acute 
maxillary



     [facility]      sinusitis,



           unspecified

 

 Office Visit  2016  4:20p  [facility]  Lisseth Hardy,  J01.00  Acute 
maxillary



     [facility]    sinusitis,



           unspecified

 

 Office Visit  2016  4:20p  [facility]  Lisseth Hardy,  J01.00  Acute 
maxillary



     [facility]    sinusitis,



           unspecified

 

 Office Visit  2016  2:00p  Orthopedic  Radha Collado,  M16.11  Unilateral



     Services Of  M.D.    primary



     C.M.A.      osteoarthritis,



           right hip









 M17.0  Bilateral primary osteoarthritis of knee

 

 M25.462  Effusion, left knee

 

 M25.562  Pain in left knee

 

 M25.461  Effusion, right knee

 

 M25.561  Pain in right knee

 

 M54.5  Low back pain









 Office Visit  2016  2:20p  [facility]  Tracy Carlin,  M25.562  
Pain in left



     Medicine - [facility]  M.D.    knee









 M25.561  Pain in right knee

 

 M25.551  Pain in right hip









 Office Visit  2015  9:40a  [facility]  [place]  M85.9  Disorder of 
bone



     Medicine Kyle Carlin M.D.    density and



     [facility]      structure,



           unspecified









 R06.02  Shortness of breath

 

 K21.9  Gastro-esophageal reflux disease without esophagitis

 

 E78.5  Hyperlipidemia, unspecified









 Office Visit  2015 10:20a  [facility]  Donavon Araiza,  R06.02  Shortness 
of



     Medicine - [facility]  NP    breath









 R07.9  Chest pain, unspecified

 

 R14.2  Eructation









 Office Visit  2015 10:00a  [facility]  Donavonangelique Araiza, NP  784.91  
Postnasal Drip



     Medicine - [facility]      









 466.0  Bronchitis Acute

 

 786.2  Cough









 Office  2015  [facility]  Donavonangelique Araiza, NP  466.0  Bronchitis Acute



 Visit  9:00a  Medicine -      



     [facility]      

 

 Office  2015  [facility]  Lisseth Hardy,  788.43  Nocturia



 Visit  1:00p  Medicine -  N.P.    



     [facility]      

 

 Office  2014  [facility]  [place]  272.0  Hypercholesterolemia Pure



 Visit  10:00a  Janet Carlin M.D.    



     [facility]      









 719.44  Pain Joint Hand

 

 424.0  Mitral Valve Disorder









 Office Visit  2014  1:00p  [facility]  [place]  V70.0  Examination



     Janet Carlin M.D.    General Medical



     [facility]      Routine AT Health



           Care Facility









 401.1  Hypertension Benign

 

 272.0  Hypercholesterolemia Pure

 

 437.8  Cerebrovascular Disease Other

 

 424.1  Aortic Valve Disorder









 Office Visit  2014 11:40a  [facility]  Lisseth Hardy,  724.5  Backache 
Unspec



     Medicine -  N.P.    



     [facility]      

 

 Office Visit  10/17/2013  1:00p  [facility]  [place]  401.1  Dinora Carlin M.D.    Benign



     [facility]      









 780.52  Insomnia Unspecified

 

 V04.81  Need For Prophylactic Vaccination & Inoculation/Influenza









 Office Visit  2013  4:20p  [facility]  Yoanna Vicki,  789.04  Pain 
Abdominal



     Medicine -  M.D., FACP    Left Lower



     [facility]      Quadrant

 

 Office Visit  2013  2:40p  [facility]  [place]  V70.0  Examination



     Janet Carlin M.D.    General Medical



     [facility]      Routine AT Health



           Care Facility









 272.0  Hypercholesterolemia Pure

 

 401.1  Hypertension Benign

 

 V76.10  Screening For Malignant Neoplasm Breast









 Office Visit  2012  9:30a  [facility]  Nurse Visit A  401.1  
Hypertension



     Medicine -      Benign



     [facility]      

 

 Office Visit  10/16/2012 10:00a  [facility]  [place]  401.1  Hypertension



     Janet Carlin M.D.    Benign



     [facility]      









 782.0  Skin Sensation Disturbance

 

 272.0  Hypercholesterolemia Pure









 Office Visit  2012 10:20a  [facility]  [place]  V70.0  Examination



     Janet Carlin M.D.    General Medical



     [facility]      Routine AT Health



           Care Facility









 272.0  Hypercholesterolemia Pure

 

 724.2  Lumbago

 

 V76.10  Screening For Malignant Neoplasm Breast

 

 V82.81  Special Screening For Osteoporosis

 

 V12.50  History Personal Circulatory Diseases Unspec









 Office Visit  2012 11:20a  [facility]  [place]  729.5  Pain In Limb



     Medicine - Osman Carlin M.D.    

 

 Office Visit  10/04/2011 10:30a  [facility]  Steve GRACE  786.50  Pain 
Chest



       LAURA Alonso    Unspec









 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

 

 401.1  Hypertension Benign









 Office Visit  2011  DO Not Use  [place]  786.05  Shortness Of



   1:20p  Denise Carlin M.D.    Breath









 v04.81  Need For Prophylactic Vaccination & Inoculation/Influenza









 Office Visit  2011  DO Not Use  [place]  401.1  Hypertension



   10:20a  Denise Carlin M.D.    Benign









 272.0  Hypercholesterolemia Pure

 

 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

 

 V76.10  Screening For Malignant Neoplasm Breast









 Office Visit  2011  DO Not Use  Lisseth Hardy,  680.8  Carbuncle &



   11:00a  Denise  N.P.    Furuncle Spec



           Sites Other

 

 Office Visit  2011  DO Not Use  [place]  401.1  Hypertension



   10:30a  Denise Carlin M.D.    Benign

 

 Office Visit  2011  DO Not Use  [place]  789.00  Pain Abdominal



   11:30a  Denise Carlin M.D.    Unspec Site

 

 Office Visit  2010  DO Not Use  [place]  V70.0  Examination



   10:45a  Denise Carlin M.D.    General Medical



           Routine AT Health



           Care Facility









 401.1  Hypertension Benign

 

 272.0  Hypercholesterolemia Pure

 

 569.49  Rectum & Anus Disorder Other









 Office Visit  2010  DO Not Use  [place],  465.9  URI  Upper



   2:45p  Denise Appiah M.D.    Respiratory



           Infections Acute



           Unspec Sites









 786.2  Cough

 

 401.1  Hypertension Benign









 Office Visit  2009  DO Not Use  Lisseth Hardy,  466.0  Bronchitis Acute



   2:45p  [place]  N.P.    

 

 Office Visit  2009  DO Not Use  [place],  V70.0  Examination



   2:30p  Denise Appiah M.D.    General Medical



           Routine AT Health



           Care Facility









 729.5  Pain In Limb

 

 401.1  Hypertension Benign

 

 272.0  Hypercholesterolemia Pure

 

 443.9  Peripheral Vascular Disease Unspec

 

 V04.81  Need For Prophylactic Vaccination & Inoculation/Influenza









 Office Visit  2009  DO Not Use  Lisseth Hardy,  727.43  Ganglion Unspec



   4:15p  James-[place]  N.P.    

 

 Office Visit  2009  DO Not Use  [place],  443.9  Peripheral



   2:45p  Denise Appiah M.D.    Vascular Disease



           Unspec









 715.94  Osteoarthrosis Unspec Genlzd Or Localized Hand

 

 401.1  Hypertension Benign









 Office Visit  2009  DO Not Use  [place],  V72.84  Examination



   3:15p  Denise Appiah M.D.    Preoperative



           Unspec









 443.9  Peripheral Vascular Disease Unspec

 

 401.1  Hypertension Benign









 Office  2008  DO Not Use  [place],  272.0  Hypercholesterolemia



 Visit  2:45p  Denise Appiah M.D.    Pure









 443.9  Peripheral Vascular Disease Unspec

 

 311  Depressive Disorder Not Elsewhere Spec









 Office Visit  2008  DO Not Use  [place],  443.9  Peripheral



   10:45a  Denise Appiah M.D.    Vascular Disease



           Unspec









 780.79  Malaise And Fatigue Other

 

 401.1  Hypertension Benign

 

 311  Depressive Disorder Not Elsewhere Spec

 

 V70.0  Examination General Medical Routine AT Health Care Facility









 Office Visit  2008  DO Not Use  [place],  272.4  Hyperlipidemia



   2:45p  Denise Appiah M.D.    Other Unspec









 401.1  Hypertension Benign









 Office  2008  DO Not Use  [place],  272.0  Hypercholesterolemia



 Visit  2:30p  Denise Appiah M.D.    Pure









 443.9  Peripheral Vascular Disease Unspec

 

 401.1  Hypertension Benign









 Office Visit  2007  DO Not Use  [place],  401.1  Hypertension



   2:45p  Denise Appiah M.D.    Benign









 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

 

 272.0  Hypercholesterolemia Pure

 

 V70.0  Examination General Medical Routine AT Health Care Facility









 Office Visit  2007  DO Not Use  [place],  401.1  Hypertension



   2:45p  Denise Appiah M.D.    Benign









 528.9  Oral Soft Tissue Diseases Other & Unspec

 

 272.0  Hypercholesterolemia Pure









 Office Visit  2006  DO Not Use  [place],  V72.31  Routine Gyn



   2:15p  Denise Appiah M.D.    Examination







Plan of Treatment

Future Appointment(s):2019 10:00 am - Radha Collado M.D. at [facility]..2019  3:00 pm - Tracy Carlin M.D. at [facility] 
Medicine - [facility]2019 - Tracy Carlin M.D.Z01.818 Encounter for other 
preprocedural examinationComments:Stop the aspirin as per instructions from Dr. Cerda not take ibuprofen, Motrin, Advil, Aleve, naproxen. Tylenol is OKTake 
your diltiazem with a sip of water on the morning of [id number] Essential (
primary) hypertensionComments:Your blood pressure is fine. Continue the same 
[id number] Unilateral primary osteoarthritis, left hipL98.9 Disorder of 
the skin and subcutaneous tissue, unspecifiedReferral:Mike Vicente MD, 
Dermatology

## 2019-06-23 NOTE — ED
Lower Extremity





- HPI Summary


HPI Summary: 





This patient is a 89 year old F [facility] with a chief complaint of increased redness and swelling and pain for 

the past couple days around the incision of a left hip replacement performed at 

[facility] on 6/11/19. Patient states that she is allergic to tape and that the 

nursing home had used tape to keep the incision site covered. She saw Dr. Collado 

on 6/21/19 and was not prescribed abx at the time. Reports physical therapy is 

going well. Denies fever, generalized illness, and any other symptoms at this 

point. Pain rated 5/10 in severity. 





- History of Current Complaint


Chief Complaint: EDSoftTissueLowExtr


Stated Complaint: HIP PAIN PER EMS


Time Seen by Provider: 06/23/19 04:34


Hx Obtained From: Patient


Mechanism Of Injury: Other - tape


Onset of Pain: Days


Pain Intensity: 5


Pain Scale Used: 0-10 Numeric


Timing: Constant


Location: Is Discrete @ - left hip replacement incision


Associated Signs And Symptoms: Positive: Swelling, Redness.  Negative: Fever, 

Weakness





- Allergies/Home Medications


Allergies/Adverse Reactions: 


 Allergies











Allergy/AdvReac Type Severity Reaction Status Date / Time


 


cephalexin [From Keflex] Allergy  Unknown Verified 06/23/19 04:33





   Reaction  





   Details  


 


latex Allergy  REDNESS, Verified 06/23/19 04:33





   ITCHINESS  


 


lisinopril Allergy  Shortness Verified 06/23/19 04:33





   of Breath  


 


metaxalone Allergy  DIZZY Verified 06/23/19 04:33


 


nylon Allergy  Rash Verified 06/23/19 04:33


 


Penicillins Allergy  Hives Verified 06/23/19 04:33


 


Sulfa (Sulfonamide Allergy  See Comment Verified 06/23/19 04:33





Antibiotics)     


 


metal Allergy Intermediate REDNESS, Uncoded 06/11/19 10:36





   ITCHINESS  


 


tape Allergy Mild Rash Uncoded 06/11/19 10:36


 


ELASTIC Allergy  SKIN Uncoded 06/11/19 10:36





   IRRITATION  














PMH/Surg Hx/FS Hx/Imm Hx


Endocrine/Hematology History: 


   Denies: Hx Diabetes, Hx Thyroid Disease


Cardiovascular History: Reports: Hx Hypertension - ON MEDS, Hx Rheumatic Fever 

- 1950


   Denies: Other Cardiovascular Problems/Disorders


Respiratory History: 


   Denies: Hx Asthma, Hx Chronic Obstructive Pulmonary Disease (COPD)


GI History: Reports: Hx Gastroesophageal Reflux Disease - FIBER THERAPY HELPS


   Denies: Hx Ulcer, Other GI Disorders


Musculoskeletal History: Reports: Hx Arthritis - BACK, LEFT HIP, Hx Bursitis - 

HX OF BILATERAL ARMS- NO PROBLEMS AT PRESENT, Hx Osteoporosis


   Denies: Hx Rheumatoid Arthritis, Hx Scoliosis


Sensory History: Reports: Hx Cataracts, Hx Contacts or Glasses - GLASSES, Hx 

Glaucoma - BILATERAL


   Denies: Hx Hearing Aid


Opthamlomology History: Reports: Hx Cataracts, Hx Contacts or Glasses - GLASSES

, Hx Glaucoma - BILATERAL


Neurological History: 


   Denies: Hx Headaches, Other Neuro Impairments/Disorders





- Cancer History


Hx Chemotherapy: No


Hx Radiation Therapy: No





- Surgical History


Surgery Procedure, Year, and Place: 1946 APPENDECTOMY, [facility].  1969 HYSTERECTOMY, 

[facility].  CHOLECYSTECTOMY, [facility].  2009 BILATERAL CATARACT EXTRACTION WITH IOL 

IMPLANTS, [facility].  2012 LEFT SHOULDER ROTATOR CUFF REPAIR, [facility].  LASER SURGERY 

BILATERAL EYES.  2010 EXCISION RIGHT LITTLE FINGER MUCOUS CYST, [facility].  Growth 

removed from upper lip.  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


Hx Anesthesia Reactions: No


Infectious Disease History: No


Infectious Disease History: 


   Denies: Hx Clostridium Difficile, Hx Hepatitis, Hx Human Immunodeficiency 

Virus (HIV), Hx of Known/Suspected MRSA, Hx Shingles, Hx Tuberculosis, Hx Known/

Suspected VRE, Hx Known/Suspected VRSA, History Other Infectious Disease, 

Traveled Outside the [place] in Last 30 Days





- Family History


Known Family History: Positive: Hypertension





- Social History


Alcohol Use: Rare


Substance Use Type: Reports: None


Smoking Status (MU): Never Smoked Tobacco


Have You Smoked in the Last Year: No





Review of Systems


Negative: Fever, Fatigue


Positive: Myalgia - at left hip incision


Positive: Other - redness and swelling a left hip incision


All Other Systems Reviewed And Are Negative: Yes





Physical Exam





- Summary


Physical Exam Summary: 





Appearance: Well-appearing, Well-nourished, lying in bed comfortable


Skin: Warm, dry, Surgical incision over left hip shows some mild diffuse 

erythema with no induration no fluctuant and no drainage


Eyes: sclera anicteric, no conjunctival pallor


ENT: mucous membranes moist


Neck: deferred


Respiratory: No signs of respiratory distress


Cardiovascular: Appears well perfused, pulses are nml


Abdomen: deferred


Musculoskeletal: Moving all 4 extremities without obvious discomfort Mild 

diffuse tenderness to left hip surgical incision


Neurological: Awake and alert, mentation is normal, speech is fluent and 

appropriate


Psychiatric: affect is normal, does not appear anxious or depressed





Triage Information Reviewed: Yes


Vital Signs On Initial Exam: 


 Initial Vitals











Temp Pulse Resp BP Pulse Ox


 


 98.3 F   84   18   156/76   97 


 


 06/23/19 04:29  06/23/19 04:29  06/23/19 04:29  06/23/19 04:29  06/23/19 04:29











Vital Signs Reviewed: Yes





Diagnostics





- Vital Signs


 Vital Signs











  Temp Pulse Resp BP Pulse Ox


 


 06/23/19 04:29  98.3 F  84  18  156/76  97














- Laboratory


Result Diagrams: 


 06/23/19 04:46





 06/23/19 04:46


Lab Statement: Any lab studies that have been ordered have been reviewed, and 

results considered in the medical decision making process.





Lower Extremity Course/Dx





- Course


Course Of Treatment: 89 year old F brought in by ambulance to [facility] from 

nursing facility with a chief complaint of increased redness and swelling and 

pain for the past couple days around the incision of a left hip replacement 

performed at [facility] on 6/11/19. Bloodwork obtained. Case discussed with Dr. Lee

, orthopedist, who agrees to come see patient in the ED. Patient will be signed 

out to Dr. Oropeza awaiting Dr. Lee's evaluation.





- Diagnoses


Provider Diagnoses: 


 Left hip pain








- Physician Notifications


Discussed Care Of Patient With: Hi Lee - orthopedist


Time Discussed With Above Provider: 06:30


Instructed by Provider To: MD Will See In ED





Discharge





- Sign-Out/Discharge


Documenting (check all that apply): Sign-Out Patient


Signing out patient TO: James Oropeza - consult


Patient Received Moderate/Deep Sedation with Procedure: No





- Discharge Plan


Condition: Stable


Referrals: 


Tracy Carlin MD [Primary Care Provider] - 





- Attestation Statements


Document Initiated by Scribe: Yes


Documenting Scribe: Emma Mejia


Provider For Whom Scribe is Documenting (Include Credential): Alexandro Sanchez MD


Scribe Attestation: 


Emma ABREU, scribed for Alexandro Sanchez MD on 06/23/19 at 0630. 


Status of Scribe Document: Ready

## 2019-06-23 NOTE — CONS
ORTHOPEDIC CONSULTATION:

 

DATE OF CONSULT:  06/23/19 - EMERGENCY DEPT

 

CHIEF COMPLAINT:  Left hip.

 

HISTORY OF PRESENT ILLNESS:  Ms. Allen is an 89-year-old female who had 
undergone a left total hip arthroplasty with Dr. Collado on 06/11/19.  She had 
done alright postoperatively, but was not safe to go home.  Initially [facility] was 
consulted, but her insurance denied a stay with them and she ended up at the 
[facility].  She has been receiving physical therapy and did see Dr. Collado at the end of this past week.  She presents today to the emergency room 
after the nurse at the [facility] was concerned about how the wound 
looked.  Ms. Allen has not had any fevers, no chills, no sweats, but does feel 
a little bit of pain low in the belly and she points down towards the left 
groin.  She also reports she has been passing quite a bit of gas.  She has not 
sustained a fall or any other particular injury, but the [facility] 
has tried to put a dressing over the hip and with trying to apply the dressing, 
there has been some twisting involved and that has caused some soreness.  She 
otherwise has been able to participate with PT, get up in about and has been 
progressing where she can get up and get to the bathroom on her own, but 
apparently is not yet safe enough to actually go home.

 

PHYSICAL EXAM:  Left hip:  Inspection of the hip finds one intact blood blister 
approximately 1.5 cm in length by 1 cm in width towards the distal end of the 
incision, but a good 3 to 4 cm away from the incision itself.  There are 3 
other broken blood blisters, which are dried and have a Xeroform dressing 
adhered to them.  The incision itself looks benign with a little bit of wispy 
erythema about the skin over the hip.  From the inferior one third of the 
incision coming downwards for a good 20 cm, she has a light ecchymosis and 
swelling.  She was tender as I palpate in that area.  She has mild warmth to 
the entirety of the hip, but not any particular heat.  She can easily maneuver 
where she can roll on to her right side moving her left leg spontaneously so I 
can look at her incision. Similarly, I can easily internally and externally 
rotate and she has no pain with hip motion.

 

DIAGNOSTIC STUDIES/LAB DATA:  X-rays:  X-rays of the left hip and AP pelvis 
were taken here at [facility].  It can be seen she has a quite a bit of gas as well as 
stool as well as some dilated loops of bowel in the pelvis.  The hip itself 
appears to be integrating nicely as there is no line between the acetabular 
implant and her bone.  There has been no settling and no shifting of the 
components.

 

IMPRESSION:

1.  Hematoma, left hip.

2.  Tape reaction, skin over left hip.

 

PLAN/RECOMMENDATIONS:  I reassured Ms. Allen and her family that she does not 
have a deep infection.  She does have some irritation of the skin, which is 
very common, especially with application of tape and then a hematoma forming as 
the stretching pulls on the skin, causing those blood blisters.  Those also can 
be very tender and painful.  We talked a bit about how she should be 
progressing and she absolutely has been, as she is moving better and is able to 
do more activities.  She is hoping to get home this week.  We also talked about 
an antibiotic, in that I am not convinced it is absolutely necessary, but to a 
certain degree, it can be reassuring and can definitely help with anything 
superficial that may be developing.  After talking about an antibiotic, she was 
started with Clindamycin.  She should continue with the Eliquis as well as her 
bowel regimen, as I believe that is giving her her current issues.  I would 
also let Dr. Collado know that she was seen in the ER today.

 

 [id number]/CPS #: [id number]

MTDD